# Patient Record
Sex: MALE | Race: WHITE | Employment: OTHER | URBAN - METROPOLITAN AREA
[De-identification: names, ages, dates, MRNs, and addresses within clinical notes are randomized per-mention and may not be internally consistent; named-entity substitution may affect disease eponyms.]

---

## 2020-01-02 ENCOUNTER — MEDICAL CORRESPONDENCE (OUTPATIENT)
Dept: HEALTH INFORMATION MANAGEMENT | Facility: CLINIC | Age: 60
End: 2020-01-02

## 2020-01-20 ENCOUNTER — TRANSFERRED RECORDS (OUTPATIENT)
Dept: HEALTH INFORMATION MANAGEMENT | Facility: CLINIC | Age: 60
End: 2020-01-20

## 2020-03-18 ENCOUNTER — TRANSFERRED RECORDS (OUTPATIENT)
Dept: HEALTH INFORMATION MANAGEMENT | Facility: CLINIC | Age: 60
End: 2020-03-18

## 2020-03-25 ENCOUNTER — MEDICAL CORRESPONDENCE (OUTPATIENT)
Dept: HEALTH INFORMATION MANAGEMENT | Facility: CLINIC | Age: 60
End: 2020-03-25

## 2020-04-02 ENCOUNTER — TRANSFERRED RECORDS (OUTPATIENT)
Dept: HEALTH INFORMATION MANAGEMENT | Facility: CLINIC | Age: 60
End: 2020-04-02

## 2020-04-03 ENCOUNTER — TRANSCRIBE ORDERS (OUTPATIENT)
Dept: OTHER | Age: 60
End: 2020-04-03

## 2020-04-03 ENCOUNTER — TELEPHONE (OUTPATIENT)
Dept: OTOLARYNGOLOGY | Facility: CLINIC | Age: 60
End: 2020-04-03

## 2020-04-03 DIAGNOSIS — R22.1 NECK MASS: Primary | ICD-10-CM

## 2020-04-03 DIAGNOSIS — C06.0 SQUAMOUS CELL CANCER OF BUCCAL MUCOSA (H): Primary | ICD-10-CM

## 2020-04-03 NOTE — TELEPHONE ENCOUNTER
Oncology/Surgical Oncology Referral Request:     Specialty Requested: Medical Oncology - ENT    Referring Provider: Dr Thierry Bass MD    Referring Clinic/Organization: Other - Dr Steven Oseguera office    Records location: Faxed     Requested Provider (if specified): Not Specified        Please review ENT Neck Mass and schedule appropriate provider. Faxed records to [phone]

## 2020-04-06 NOTE — TELEPHONE ENCOUNTER
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 Offered Wednesday and Friday appointments. Patient could not schedule on Friday.     We will arrange consult with Dr. Dior on Wednesday 4/8 with a PET scan prior. Patient's itinerary was emailed to him for review. Patient verbalized understanding of appointment information as well as PET scan instructions.     Patient was encouraged to call with further questions or concerns.       Chani Elise, RN, BSN

## 2020-04-08 ENCOUNTER — HOSPITAL ENCOUNTER (OUTPATIENT)
Dept: PET IMAGING | Facility: CLINIC | Age: 60
End: 2020-04-08
Attending: OTOLARYNGOLOGY
Payer: COMMERCIAL

## 2020-04-08 ENCOUNTER — PRE VISIT (OUTPATIENT)
Dept: OTOLARYNGOLOGY | Facility: CLINIC | Age: 60
End: 2020-04-08

## 2020-04-08 ENCOUNTER — OFFICE VISIT (OUTPATIENT)
Dept: OTOLARYNGOLOGY | Facility: CLINIC | Age: 60
End: 2020-04-08
Payer: COMMERCIAL

## 2020-04-08 ENCOUNTER — PREP FOR PROCEDURE (OUTPATIENT)
Dept: OTOLARYNGOLOGY | Facility: CLINIC | Age: 60
End: 2020-04-08

## 2020-04-08 VITALS
SYSTOLIC BLOOD PRESSURE: 171 MMHG | TEMPERATURE: 98.2 F | DIASTOLIC BLOOD PRESSURE: 88 MMHG | HEIGHT: 74 IN | RESPIRATION RATE: 18 BRPM | BODY MASS INDEX: 25.8 KG/M2 | WEIGHT: 201 LBS | HEART RATE: 82 BPM

## 2020-04-08 DIAGNOSIS — C06.0 SQUAMOUS CELL CANCER OF BUCCAL MUCOSA (H): Primary | ICD-10-CM

## 2020-04-08 DIAGNOSIS — C06.0 SQUAMOUS CELL CANCER OF BUCCAL MUCOSA (H): ICD-10-CM

## 2020-04-08 PROCEDURE — 70491 CT SOFT TISSUE NECK W/DYE: CPT

## 2020-04-08 PROCEDURE — 78816 PET IMAGE W/CT FULL BODY: CPT | Mod: PI

## 2020-04-08 PROCEDURE — 34300033 ZZH RX 343: Performed by: OTOLARYNGOLOGY

## 2020-04-08 PROCEDURE — 25000128 H RX IP 250 OP 636: Performed by: OTOLARYNGOLOGY

## 2020-04-08 PROCEDURE — A9552 F18 FDG: HCPCS | Performed by: OTOLARYNGOLOGY

## 2020-04-08 RX ORDER — FENOFIBRATE 150 MG/1
CAPSULE ORAL
COMMUNITY
Start: 2020-02-11 | End: 2020-04-14

## 2020-04-08 RX ORDER — IOPAMIDOL 755 MG/ML
10-140 INJECTION, SOLUTION INTRAVASCULAR ONCE
Status: COMPLETED | OUTPATIENT
Start: 2020-04-08 | End: 2020-04-08

## 2020-04-08 RX ADMIN — FLUDEOXYGLUCOSE F-18 14.5 MCI.: 500 INJECTION, SOLUTION INTRAVENOUS at 14:10

## 2020-04-08 RX ADMIN — IOPAMIDOL 132 ML: 755 INJECTION, SOLUTION INTRAVENOUS at 15:33

## 2020-04-08 ASSESSMENT — MIFFLIN-ST. JEOR: SCORE: 1801.73

## 2020-04-08 ASSESSMENT — PAIN SCALES - GENERAL: PAINLEVEL: NO PAIN (0)

## 2020-04-08 NOTE — NURSING NOTE
"Chief Complaint   Patient presents with     Consult     squamous cell carcinoma of oral cavity    Blood pressure (!) 171/88, pulse 82, temperature 98.2  F (36.8  C), resp. rate 18, height 1.88 m (6' 2.02\"), weight 91.2 kg (201 lb).      Travis Kaur LPN    "

## 2020-04-08 NOTE — Clinical Note
4/8/2020       RE: Norris Reyes  [address]  [place] [address]     Dear Colleague,    Thank you for referring your patient, Norris Reyes, to the [facility] EAR [facility]ROAT at [facility]. Please see a copy of my visit note below.     Dictation on: 04/08/2020  5:19 PM by: KAMERON CAAL [[id number]]         Past Medical History:   Diagnosis Date     Autoimmune disease (H) 2/11/2020     Hypertension 2/11/2020    Losartan     No past surgical history on file.    Current Outpatient Medications:      Fenofibrate 150 MG CAPS, , Disp: , Rfl:      LOSARTAN POTASSIUM PO, , Disp: , Rfl:   No current facility-administered medications for this visit.   No Known Allergies  Social History     Tobacco Use     Smoking status: Never Smoker     Smokeless tobacco: Never Used   Substance Use Topics     Alcohol use: Yes     Comment: 1 drink a day but not currently     Review Of Systems  Skin: negative  Eyes: negative  Ears/Nose/Throat: negative  Respiratory: No shortness of breath, dyspnea on exertion, cough, or hemoptysis  Cardiovascular: negative  Gastrointestinal: negative  Genitourinary: negative  Musculoskeletal: negative  Neurologic: negative  Psychiatric: negative  Hematologic/Lymphatic/Immunologic: negative  Endocrine: negative     Dictation on: 04/08/2020  5:21 PM by: KAMERON CAAL [[id number]]           Again, thank you for allowing me to participate in the care of your patient.      Sincerely,    Kameron Caal MD

## 2020-04-08 NOTE — PATIENT INSTRUCTIONS
1. Surgery planned for Thursday 4/23.   2. Please schedule pre-op physical with PCP and contact Chani if you have issues scheduling this.   3. Please call the ENT clinic with any questions,concerns, new or worsening symptoms.    -Clinic number is [phone]   - Chani's direct line (Dr. Dior's nurse) [phone]    4. Chani will be in contact with you on Friday after tumor board.

## 2020-04-08 NOTE — LETTER
4/8/2020      RE: Norris Reyes  [address]  [place] [address]       April 8, 2020         Steven Grider MD   [address]   [place] [address]         Dear Dr. Grider,      Thank you for requesting consultation on Norris Reyes.  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  He has noticed a lump under his left jaw bone.  He has not had much pain from this.  He has not noticed any parotid swelling.  He has had no numbness of his tongue or cheek.  He has no otalgia or odynophagia.         Past Medical History:   Diagnosis Date     Autoimmune disease (H) 2/11/2020     Hypertension 2/11/2020    Losartan     No past surgical history on file.    Current Outpatient Medications:      Fenofibrate 150 MG CAPS, , Disp: , Rfl:      LOSARTAN POTASSIUM PO, , Disp: , Rfl:   No current facility-administered medications for this visit.   No Known Allergies  Social History     Tobacco Use     Smoking status: Never Smoker     Smokeless tobacco: Never Used   Substance Use Topics     Alcohol use: Yes     Comment: 1 drink a day but not currently     Review Of Systems  Skin: negative  Eyes: negative  Ears/Nose/Throat: negative  Respiratory: No shortness of breath, dyspnea on exertion, cough, or hemoptysis  Cardiovascular: negative  Gastrointestinal: negative  Genitourinary: negative  Musculoskeletal: negative  Neurologic: negative  Psychiatric: negative  Hematologic/Lymphatic/Immunologic: negative  Endocrine: negative    PHYSICAL EXAMINATION:  He is well appearing, in no distress.  Examination of the oral cavity reveals he has an obvious approximately 3.0 x 2.5 cm lesion in the buccal mucosa that is somewhat  posterior and not involving the oral commissure.  It dips in the gingival buccal sulcus posteriorly and is a little bit erosive inferiorly.  It does get very close to and slightly onto the alveolar surface where his wisdom teeth would have been on the left side.  I do not see any evidence for any bone invasion or erosion.  The retromolar trigone itself does not appear to be involved.  It does go up towards the upper gingival buccal sulcus, but does not involve the maxillary tuberosity or the maxillary dentition.  Examining him externally, I do not feel or sense any involvement in the subcutaneous tissues of the cheek.  The remainder of the oral cavity examination is unremarkable.  He cannot tolerate mirror exam.  Examination of the neck reveals an obvious mass or conglomerate of nodes in the left level IB.  He has no lymphadenopathy.      IMAGING:  The patient had a CT scan and PET that shows the primary lesion as well as disease in level IB but no other disease is evident.  They have not been officially read.      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      PLAN:   1.  The patient was counseled that this requires surgery via wide local excision of the buccal mucosa and left neck dissection, levels I through IV.  He will also need a feeding tube and possible trach as well as a forearm flap.  I did do an Tiburcio's test on the right arm as he is left handed.  He has a right arm suitable for transfer.   2.  I discussed the risks of surgery, which include but are not limited to bleeding, infection, return trips to the operating room and possible flap loss.  He understands and wishes to proceed.   3.  We will discuss his case at our Tumor Conference this week.   4.  He did meet with our speech pathologist.  All of his questions were answered.      Sincerely,         Kameron Dior M.D.        Otolaryngology-Head & Neck Surgery    [phone]

## 2020-04-08 NOTE — NURSING NOTE
Teaching Flowsheet - ENT   Relevant Diagnosis: scc left buccal mucosa  Teaching Topic: wide local excision of left buccal mucosa, left neck dissection, possible trach placement, NG tube placement, right forearm free flap recon, right thighSTSG  Person(s) involved in teaching:  Patient     Motivation Level:  Asks Questions:   Yes  Eager to Learn:   Yes  Cooperative:   Yes  Receptive (willing/able to accept information):   Yes  Comments: Reviewed pre-op H and P,  NPO prior to  surgery,  pre-op scrub (given Hibiclens)  Reviewed post-op  cares , activity and pain.     Patient demonstrates understanding of the following:  Reason for the appointment, diagnosis and treatment plan:   Yes  Knowledge of proper use of medications and conditions for which they are ordered (with special attention to potential side effects or drug interactions):  stop aspirin products 1 week before surgery Yes  Which situations necessitate calling provider and whom to contact:   Yes  Nutritional needs and diet plan:   Yes  Pain management techniques:   Yes  Patient instructed on hand hygiene:  Yes  How and/when to access community resources:   Yes     Infection Prevention:  Patient   demonstrates understanding of the following:  Surgical procedure site care taught Yes  Signs and symptoms of infection taught Yes  Wound care taught Yes  Instructional Materials Used/Given: pre- op booklet,verbal  Instruction.    Chani Elise, RN, BSN

## 2020-04-08 NOTE — PROGRESS NOTES
April 8, 2020         Steven Grider MD   [facility]   [address]   [place] [id number]         Dear Dr. Grider,      Thank you for requesting consultation on Norris Reyes.  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  He has noticed a lump under his left jaw bone.  He has not had much pain from this.  He has not noticed any parotid swelling.  He has had no numbness of his tongue or cheek.  He has no otalgia or odynophagia.         Past Medical History:   Diagnosis Date     Autoimmune disease (H) 2/11/2020     Hypertension 2/11/2020    Losartan     No past surgical history on file.    Current Outpatient Medications:      Fenofibrate 150 MG CAPS, , Disp: , Rfl:      LOSARTAN POTASSIUM PO, , Disp: , Rfl:   No current facility-administered medications for this visit.   No Known Allergies  Social History     Tobacco Use     Smoking status: Never Smoker     Smokeless tobacco: Never Used   Substance Use Topics     Alcohol use: Yes     Comment: 1 drink a day but not currently     Review Of Systems  Skin: negative  Eyes: negative  Ears/Nose/Throat: negative  Respiratory: No shortness of breath, dyspnea on exertion, cough, or hemoptysis  Cardiovascular: negative  Gastrointestinal: negative  Genitourinary: negative  Musculoskeletal: negative  Neurologic: negative  Psychiatric: negative  Hematologic/Lymphatic/Immunologic: negative  Endocrine: negative    PHYSICAL EXAMINATION:  He is well appearing, in no distress.  Examination of the oral cavity reveals he has an obvious approximately 3.0 x 2.5 cm lesion in the buccal mucosa that is somewhat posterior and not involving the oral commissure.  It dips in the gingival buccal sulcus  posteriorly and is a little bit erosive inferiorly.  It does get very close to and slightly onto the alveolar surface where his wisdom teeth would have been on the left side.  I do not see any evidence for any bone invasion or erosion.  The retromolar trigone itself does not appear to be involved.  It does go up towards the upper gingival buccal sulcus, but does not involve the maxillary tuberosity or the maxillary dentition.  Examining him externally, I do not feel or sense any involvement in the subcutaneous tissues of the cheek.  The remainder of the oral cavity examination is unremarkable.  He cannot tolerate mirror exam.  Examination of the neck reveals an obvious mass or conglomerate of nodes in the left level IB.  He has no lymphadenopathy.      IMAGING:  The patient had a CT scan and PET that shows the primary lesion as well as disease in level IB but no other disease is evident.  They have not been officially read.      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      PLAN:   1.  The patient was counseled that this requires surgery via wide local excision of the buccal mucosa and left neck dissection, levels I through IV.  He will also need a feeding tube and possible trach as well as a forearm flap.  I did do an Tiburcio's test on the right arm as he is left handed.  He has a right arm suitable for transfer.   2.  I discussed the risks of surgery, which include but are not limited to bleeding, infection, return trips to the operating room and possible flap loss.  He understands and wishes to proceed.   3.  We will discuss his case at our Tumor Conference this week.   4.  He did meet with our speech pathologist.  All of his questions were answered.      Sincerely,         Kameron Dior M.D.        Otolaryngology-Head & Neck Surgery    [phone]

## 2020-04-09 ENCOUNTER — ALLIED HEALTH/NURSE VISIT (OUTPATIENT)
Dept: SPEECH THERAPY | Facility: CLINIC | Age: 60
End: 2020-04-09

## 2020-04-09 PROBLEM — C06.0 SQUAMOUS CELL CANCER OF BUCCAL MUCOSA (H): Status: ACTIVE | Noted: 2020-04-09

## 2020-04-09 LAB — COPATH REPORT: NORMAL

## 2020-04-09 PROCEDURE — 00000346 ZZHCL STATISTIC REVIEW OUTSIDE SLIDES TC 88321: Performed by: OTOLARYNGOLOGY

## 2020-04-09 NOTE — PROGRESS NOTES
Pt seen for brief allied health care provider visit. Introduced self and SLP services. Pt will likely undergo WLC to remove left buccal mass, neck dissection and reconstruction. Provided brief education on potential changes in speech, swallowing, and jaw ROM. Pt will likely require formal swallowing evaluation after surgery. Pt agreeable to participate in speech pathology services as appropriate following surgery. He will also potentially benefit from virtual follow up once he returns home.

## 2020-04-09 NOTE — NURSING NOTE
Patient was given green forearm bracelet and advised to wear on his RIGHT arm the morning of surgery. Instructed patient that this will ensure that there are no IV's placed or labs drawn from that arm on the morning of surgery. Patient verbalized understanding of this and was encouraged to call with further questions or concerns.     Chani Elise RN, BSN

## 2020-04-09 NOTE — PROGRESS NOTES
"Head & Neck Tumor Conference Note        Status: New  Staff: Dr. Dior    Tumor Site: Left buccal mucosa  Tumor Pathology: SCC  Tumor Stage: [id number]  Tumor Treatment: None    Reason for Review: Review imaging, path, and POC    Brief History: This is a 59 year old male with a history of known white patches and perhaps some erythroplakia in his oral cavity.  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  He has noticed a lump under his left jaw bone.  He has not had much pain from this.  He has not noticed any parotid swelling.  He has had no numbness of his tongue or cheek.  He has no otalgia or odynophagia. Examination in the office demonstrated a 3x2.5cm lesion on the left buccal mucosa that spared the oral commissure but does involve the gingivobuccal sulcus posteriorly with some involvement of the posterior mandibular alveolus without evidence of caitlin invasion. It did not seem to involve the subcutaneous tissues of the cheek. There was an obvious mass in left level Ib.    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 Fairly sizable hypermetabolic lesion along the left buccal mucosa consistent with patient's biopsy-proven primary disease. It spans along the inner surface of the maxilla and mandible.   1b. Left level 1B metastatic lymphadenopathy, the largest measuring 2.7 cm. Additional indeterminate " "left level 2 and left level 3 nodes, not enlarged but mild-moderately FDG avid.  2. 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 Scattered tiny solid pulmonary nodules.  2. Otherwise, no evidence for metastatic disease in the chest, abdomen, or pelvis.  3. See dedicated neuroradiology report for the results of the high resolution PET CT of the neck.   4. Indeterminate left renal cystic lesion. This is likely a hemorrhagic or proteinaceous cyst, however ultrasound could be used to determine if this lesion is truly cystic or solid.  5. Chronic appearing mild compression deformity of L1 vertebral body.\"    Pathology:   FINAL DIAGNOSIS:   CASE FROM "[employer] [place], [place] ([id number], OBTAINED 03/18/2020):   ORAL MUCOSA, LEFT VOCAL, BIOPSY:   - INVASIVE KERATINIZING SQUAMOUS CELL CARCINOMA, moderately differentiated, extending to biopsy margins.   - Invasive carcinoma involves glossal muscle, with a minimum depth of invasion of 4 mm.   - P16 immunostain is negative.     Tumor Board Recommendation:   Discussion: 58yo male with [id number] SCC of the left buccal mucosa with left level Ib lymphadenopathy.    Review of imaging shows the FDG-avid left buccal mucosa tumor. The inferior aspect of the tumor is bulbous which suggests that it may be larger and deeper than it appeared on exam. There are several enlarged and hypermetabolic left level IB nodes with no sign of invasion of the submandibular gland. There are several other indeterminate left level II nodes. There is no evidence of cortical erosion of the mandible or maxilla, which agrees with physical exam.    Treatment recommendation is WLE with likely flap repair, left neck dissection, adjuvant radiation and possible chemo pending final pathology. "     Plan:   -Pre-op dental consult (will be more difficult evaluation with flap in place)  -WLE with likely flap repair, left neck dissection, adjuvant radiation and possible chemo pending final pathology.     Juan Pablo Pang MD PGY-3  Otolaryngology- Head and Neck Surgery    Documentation / Disclaimer Cancer Tumor Board Note  Cancer tumor board recommendations do not override what is determined to be reasonable care and treatment, which is dependent on the circumstances of a patient's case; the patient's medical, social, and personal concerns; and the clinical judgment of the oncologist [physician].

## 2020-04-10 ENCOUNTER — TUMOR CONFERENCE (OUTPATIENT)
Dept: ONCOLOGY | Facility: CLINIC | Age: 60
End: 2020-04-10
Payer: COMMERCIAL

## 2020-04-10 NOTE — PROGRESS NOTES
Called patient to discuss the following PET scan results:    IMPRESSION: In this patient with recently diagnosed squamous cell  carcinoma of the left buccal mucosal:  1. Scattered tiny solid pulmonary nodules.  2. Otherwise, no evidence for metastatic disease in the chest,  abdomen, or pelvis.  3. See dedicated neuroradiology report for the results of the high  resolution PET CT of the neck.   4. Indeterminate left renal cystic lesion. This is likely a  hemorrhagic or proteinaceous cyst, however ultrasound could be used to  determine if this lesion is truly cystic or solid.  5. Chronic appearing mild compression deformity of L1 vertebral body.    Impression: In this patient with recently diagnosed squamous cell  carcinoma of the left buccal mucosal:  1a. Fairly sizable hypermetabolic lesion along the left buccal mucosa  consistent with patient's biopsy-proven primary disease. It spans  along the inner surface of the maxilla and mandible.  1b. Left level 1B metastatic lymphadenopathy, the largest measuring  2.7 cm. Additional indeterminate left level 2 and left level 3 nodes,   not enlarged but mild-moderately FDG avid.     2. Please refer to the whole body PET CT performed as a separate  report, for the findings of the remainder of the body.       Reviewed plan for wide local excision, left neck dissection and forearm free flap with post op radiation. Reviewed radiation schedule with patient and advised that we would recommend this be done here if at all possible. Patient is in agreement to come here for treatment. We will have him meet with radiation oncology pre-op as well as dental evaluation pre-op. Patient agreeable to this plan.     He is now scheduled for the followin: PAC consult Wednesday 4/15 at 11:00am.   2. Radiation consult with Dr. Kurtz on 4/15 at 1:00pm.   3. Awaiting plan from dental resident on time and location of dental eval on 4/15.   4. Surgery is scheduled for .     All  appointment information was sent to patient via e-mail as well. He and his wife verbalized understanding and was encouraged to call with further questions or concerns.     Chani Elise RN, BSN

## 2020-04-13 ENCOUNTER — PATIENT OUTREACH (OUTPATIENT)
Dept: OTOLARYNGOLOGY | Facility: CLINIC | Age: 60
End: 2020-04-13

## 2020-04-13 NOTE — TELEPHONE ENCOUNTER
FUTURE VISIT INFORMATION      SURGERY INFORMATION:    Date: 4/23/20    Location: [facility] OR    Surgeon:  Kameron Dior MD     Anesthesia Type:  General    Procedure: wide local excision of left buccal mucosa squamous cell carcinoma left modified radical neck dissection possible Tracheostomy placement, nasogastric tube placement    Consult: Tumor Conference 4/10    RECORDS REQUESTED FROM:       Primary Care Provider: [facility]

## 2020-04-13 NOTE — PROGRESS NOTES
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 Dior    HISTORY OF PRESENT ILLNESS: ***    Mr. 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  He presented to an outside ENT surgeon on 3/18/2020 with the complaint of a lesion of the left buccal mucosa which had been present and getting worse over the past 1 month.  Exam at that time was notable for an ulcerated lesion involving the left buccal mucosa and left lymphadenopathy involving levels I-III.  A biopsy was performed in clinic and pathology which has been reviewed at our institution ([id number]) showed moderately differentiated invasive keratinizing squamous cell carcinoma, p16-, with a minimum depth of invasion of 4 mm.  He was subsequently referred to the [facility] for further management.    He underwent staging PET/CT on 2020 which showed an area of FDG avid nodular thickening along the left buccal mucosa measuring approximately 1.7 x 1.1 x 3.2 cm with SUV max 20.5.  The lesion spanned the inner surface of the maxilla and mandible without any obvious invasion into the overlying subcutaneous tissues of the left cheek or subjacent bone.  In addition, there was an enlarged and FDG avid left level 1B lymph node measuring 2.7 cm with SUV max 13.4.  Other mildly FDG avid lymph nodes included two left level 1B lymph nodes measuring 1.1 cm and 0.8 cm  "respectively as well as a 0.8 cm left level 2A lymph node and a 0.8 cm left level 3 lymph node.  There was no evidence of hypermetabolic disease on the right.  Systemic imaging revealed multiple tiny solid pulmonary nodules measuring up to 3 mm but otherwise no findings suspicious for metastatic disease.    The patient also met Dr. Dior of ENT surgery on 4/8/2020. At that time exam was as follows: \"oral cavity reveals he has an obvious approximately 3.0 x 2.5 cm lesion in the buccal mucosa that is somewhat posterior and not involving the oral commissure.  It dips in the gingival buccal sulcus posteriorly and is a little bit erosive inferiorly.  It does get very close to and slightly onto the alveolar surface where his wisdom teeth would have been on the left side.  I do not see any evidence for any bone invasion or erosion.  The retromolar trigone itself does not appear to be involved.  It does go up towards the upper gingival buccal sulcus, but does not involve the maxillary tuberosity or the maxillary dentition.  Examining him externally, I do not feel or sense any involvement in the subcutaneous tissues of the cheek.  The remainder of the oral cavity examination is unremarkable.  He cannot tolerate mirror exam.  Examination of the neck reveals an obvious mass or conglomerate of nodes in the left level IB.\" Surgery via wide local excision of the buccal mucosa and left neck dissection (levels I through IV) was recommended. He is referred to our department to further discuss the role of radiotherapy in the overall management of his recently diagnosed oral cavity cancer.       PAST MEDICAL HISTORY: ***   Past Medical History:   Diagnosis Date     Autoimmune disease (H) 2/11/2020     Hypertension 2/11/2020    Losartan     No past surgical history on file.      CHEMOTHERAPY HISTORY: ***     PAST RADIATION THERAPY HISTORY: ***     IMPLANTED DEVICES: ***    AUTOIMMUNE/CONNECTIVE TISSUE DISORDERS: ***    MEDICATIONS: "   Current Outpatient Medications   Medication Sig Dispense Refill     Fenofibrate 150 MG CAPS        LOSARTAN POTASSIUM PO          ALLERGIES:  No Known Allergies.    SOCIAL HISTORY: ***. Lives in [place]. Never smoker. Occ EtOH, none currently.     FAMILY HISTORY: ***  family history includes Heart Disease in his brother; Thyroid Disease in his sister.    REVIEW OF SYMPTOMS:  A full 14-point review of systems was performed. ***    PHYSICAL EXAMINATION:    There were no vitals taken for this visit.  ***    ECOG PS: ***    ASSESSMENT: ***    RECOMMENDATIONS: ***    Thank you for allowing us to participate in this patient's care.  Please feel free to call with any questions or concerns.    The patient was seen and discussed with staff, Dr. Kurtz.     ***

## 2020-04-15 ENCOUNTER — PRE VISIT (OUTPATIENT)
Dept: SURGERY | Facility: CLINIC | Age: 60
End: 2020-04-15

## 2020-04-15 ENCOUNTER — OFFICE VISIT (OUTPATIENT)
Dept: RADIATION ONCOLOGY | Facility: CLINIC | Age: 60
End: 2020-04-15
Attending: OTOLARYNGOLOGY
Payer: COMMERCIAL

## 2020-04-15 ENCOUNTER — ANESTHESIA EVENT (OUTPATIENT)
Dept: SURGERY | Facility: CLINIC | Age: 60
End: 2020-04-15

## 2020-04-15 ENCOUNTER — OFFICE VISIT (OUTPATIENT)
Dept: SURGERY | Facility: CLINIC | Age: 60
End: 2020-04-15
Payer: COMMERCIAL

## 2020-04-15 VITALS
SYSTOLIC BLOOD PRESSURE: 150 MMHG | HEIGHT: 72 IN | WEIGHT: 200 LBS | BODY MASS INDEX: 27.09 KG/M2 | TEMPERATURE: 97.9 F | DIASTOLIC BLOOD PRESSURE: 87 MMHG | HEART RATE: 89 BPM | RESPIRATION RATE: 19 BRPM | OXYGEN SATURATION: 98 %

## 2020-04-15 DIAGNOSIS — Z01.818 PREOP EXAMINATION: ICD-10-CM

## 2020-04-15 DIAGNOSIS — C06.0 SQUAMOUS CELL CANCER OF BUCCAL MUCOSA (H): Primary | ICD-10-CM

## 2020-04-15 DIAGNOSIS — Z01.818 PREOP EXAMINATION: Primary | ICD-10-CM

## 2020-04-15 LAB
ANION GAP SERPL CALCULATED.3IONS-SCNC: 4 MMOL/L (ref 3–14)
BUN SERPL-MCNC: 21 MG/DL (ref 7–30)
CALCIUM SERPL-MCNC: 9.4 MG/DL (ref 8.5–10.1)
CHLORIDE SERPL-SCNC: 105 MMOL/L (ref 94–109)
CO2 SERPL-SCNC: 29 MMOL/L (ref 20–32)
CREAT SERPL-MCNC: 1.22 MG/DL (ref 0.66–1.25)
ERYTHROCYTE [DISTWIDTH] IN BLOOD BY AUTOMATED COUNT: 11.7 % (ref 10–15)
GFR SERPL CREATININE-BSD FRML MDRD: 65 ML/MIN/{1.73_M2}
GLUCOSE SERPL-MCNC: 131 MG/DL (ref 70–99)
HCT VFR BLD AUTO: 48.2 % (ref 40–53)
HGB BLD-MCNC: 15.8 G/DL (ref 13.3–17.7)
MCH RBC QN AUTO: 28.9 PG (ref 26.5–33)
MCHC RBC AUTO-ENTMCNC: 32.8 G/DL (ref 31.5–36.5)
MCV RBC AUTO: 88 FL (ref 78–100)
PLATELET # BLD AUTO: 159 10E9/L (ref 150–450)
POTASSIUM SERPL-SCNC: 4 MMOL/L (ref 3.4–5.3)
RBC # BLD AUTO: 5.47 10E12/L (ref 4.4–5.9)
SODIUM SERPL-SCNC: 137 MMOL/L (ref 133–144)
WBC # BLD AUTO: 5.5 10E9/L (ref 4–11)

## 2020-04-15 PROCEDURE — G0463 HOSPITAL OUTPT CLINIC VISIT: HCPCS | Performed by: RADIOLOGY

## 2020-04-15 ASSESSMENT — ENCOUNTER SYMPTOMS
SPEECH CHANGE: 0
SHORTNESS OF BREATH: 0
EYE DISCHARGE: 0
BLOOD IN STOOL: 0
FALLS: 0
FEVER: 0
BRUISES/BLEEDS EASILY: 0
BACK PAIN: 0
MYALGIAS: 0
DOUBLE VISION: 0
BLURRED VISION: 0
CLAUDICATION: 0
POLYDIPSIA: 0
CONSTIPATION: 0
ORTHOPNEA: 0
SENSORY CHANGE: 0
NAUSEA: 0
COUGH: 0
SORE THROAT: 0
VOMITING: 0
HEMOPTYSIS: 0
NERVOUS/ANXIOUS: 1
SEIZURES: 0
MEMORY LOSS: 0
TINGLING: 0
ABDOMINAL PAIN: 0
PALPITATIONS: 0
PHOTOPHOBIA: 0
TREMORS: 0
EYE PAIN: 0
HEMATURIA: 0
FLANK PAIN: 0
DIZZINESS: 0
LOSS OF CONSCIOUSNESS: 0
HALLUCINATIONS: 0
FOCAL WEAKNESS: 0
WHEEZING: 0
DEPRESSION: 0
PND: 0
WEIGHT LOSS: 0
DIARRHEA: 0
STRIDOR: 0
EYE REDNESS: 0
SPUTUM PRODUCTION: 0
INSOMNIA: 0
NECK PAIN: 0
DIAPHORESIS: 0
WEAKNESS: 0
FREQUENCY: 0
HEADACHES: 0
DYSURIA: 0
CHILLS: 0
HEARTBURN: 0
SINUS PAIN: 0

## 2020-04-15 ASSESSMENT — LIFESTYLE VARIABLES: SUBSTANCE_ABUSE: 0

## 2020-04-15 ASSESSMENT — PAIN SCALES - GENERAL: PAINLEVEL: NO PAIN (0)

## 2020-04-15 ASSESSMENT — MIFFLIN-ST. JEOR: SCORE: 1765.19

## 2020-04-15 NOTE — H&P
Pre-Operative H & P     CC:  Preoperative exam to assess for increased cardiopulmonary risk while undergoing surgery and anesthesia.    Date of Encounter: 4/15/2020  Primary Care Physician:  No primary care provider on file.  Associated Diagnosis: SCC of left buccal  mucosa    STANLEY Reyes is a 59 year old male who presents for pre-operative H & P in preparation for wide local excision of left buccal mucosa squamous cell carcinoma, left modified radical neck dissection, possible Tracheostomy placement, nasogastric tube placement, right forearm free flap, split thickness skin graft from right thigh with Dr. Dior and Dr. Castañeda on 4/23/2020 at [facility]. General Anesthesia.    The patient is a 59-year-old male with a past medical history significant for hypertension.  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  He recently had a visit with the dentist who noticed an area of erythema on his left cheek.  He was recommended to have this biopsied.  Subsequent biopsy revealed squamous cell carcinoma of the left buccal mucosa.  He also has a lump under his left jawbone that he describes as nontender.  He was referred to the [facility] otolaryngology and ENT for further evaluation and treatment.  He reports today that he had a dental visit this morning and he is to have extraction of 3 teeth prior to surgery.  He also is waiting to hear from ENT nurse coordinator about a presurgical COVID-19 test.  He has been counseled on the above procedure and wishes to proceed.    History is obtained from the patient.     Past Medical History  Past Medical History:   Diagnosis Date     Autoimmune disease (H) 2/11/2020     Hypertension 2/11/2020    Losartan     Nephrolithiasis      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)        Past Surgical History  History reviewed. No pertinent surgical history.    Hx of Blood  transfusions/reactions: denies     Hx of abnormal bleeding or anti-platelet use: denies    Menstrual history: No LMP for male patient.:     Steroid use in the last year: denies    Personal or FH with difficulty with Anesthesia:  denies    Prior to Admission Medications  Current Outpatient Medications   Medication Sig Dispense Refill     diphenhydrAMINE (BENADRYL) 25 MG tablet Take 25 mg by mouth nightly as needed for sleep       fenofibrate (TRIGLIDE/LOFIBRA) 160 MG tablet Take 160 mg by mouth daily       Homeopathic Products (FRANKINCENSE UPLIFTING) OIL Apply topically 2 times daily To outside of cheek       LOSARTAN POTASSIUM PO Take 50 mg by mouth every morning          Allergies  No Known Allergies    Social History  Social History     Socioeconomic History     Marital status:      Spouse name: Not on file     Number of children: Not on file     Years of education: Not on file     Highest education level: Not on file   Occupational History     Not on file   Social Needs     Financial resource strain: Not on file     Food insecurity     Worry: Not on file     Inability: Not on file     Transportation needs     Medical: Not on file     Non-medical: Not on file   Tobacco Use     Smoking status: Never Smoker     Smokeless tobacco: Never Used   Substance and Sexual Activity     Alcohol use: Yes     Comment: 1 drink a day but not currently     Drug use: Never     Sexual activity: Not Currently     Partners: Female     Birth control/protection: Male Surgical   Lifestyle     Physical activity     Days per week: Not on file     Minutes per session: Not on file     Stress: Not on file   Relationships     Social connections     Talks on phone: Not on file     Gets together: Not on file     Attends [employer] service: Not on file     Active member of club or organization: Not on file     Attends meetings of clubs or organizations: Not on file     Relationship status: Not on file     Intimate partner violence     Fear  "of current or ex partner: Not on file     Emotionally abused: Not on file     Physically abused: Not on file     Forced sexual activity: Not on file   Other Topics Concern     Not on file   Social History Narrative     Not on file       Family History  Family History   Problem Relation Age of Onset     Heart Disease Brother              Thyroid Disease Sister      Hypertension Mother      No Known Problems Father            Anesthesia Evaluation     . Pt has not had prior anesthetic            ROS/MED HX  The complete review of systems is negative other than noted in the HPI or here.  ENT/Pulmonary:     (+)CATHI risk factors hypertension, , . .    Neurologic:  - neg neurologic ROS     Cardiovascular:     (+) hypertension----. : . . . :. . Previous cardiac testing date:results:date: results:ECG reviewed date: results: date: results:          METS/Exercise Tolerance:  >4 METS   Hematologic:  - neg hematologic  ROS       Musculoskeletal:  - neg musculoskeletal ROS       GI/Hepatic:  - neg GI/hepatic ROS       Renal/Genitourinary:     (+) Nephrolithiasis ,       Endo:  - neg endo ROS       Psychiatric:  - neg psychiatric ROS       Infectious Disease:  - neg infectious disease ROS       Malignancy:   (+) Malignancy History of Other  Other CA Squamous cell cancer of left buccal mucosa Active status post         Other:    - neg other ROS         PHYSICAL EXAM:   Mental Status/Neuro: A/A/O; Age Appropriate   Airway: Facies: Feasible  Mallampati: II  Mouth/Opening: Full  TM distance: > 6 cm  Neck ROM: Full   Respiratory: Auscultation: CTAB     Resp. Rate: Normal     Resp. Effort: Normal      CV: Rhythm: Regular  Rate: Age appropriate  Heart: Normal Sounds  Edema: None   Comments:      Dental: Normal Dentition               Temp: 97.9  F (36.6  C) Temp src: Oral BP: (!) 150/87 Pulse: 89   Resp: 19 SpO2: 98 %         200 lbs 0 oz  6' 0\"   Body mass index is 27.12 kg/m .       Physical Exam  Constitutional: Awake, alert, " cooperative, no apparent distress, and appears stated age.  Eyes: Pupils equal, round and reactive to light, extra ocular muscles intact, sclera clear, conjunctiva normal.  HENT: Normocephalic, oral pharynx with moist mucus membranes, good dentition. Obvious mass present on left neck, submandibular.   Respiratory: Clear to auscultation bilaterally, no crackles or wheezing.  Cardiovascular: Regular rate and rhythm, normal S1 and S2, and no murmur noted.  Carotids +2, no bruits. No edema. Palpable pulses to radial  DP and PT arteries.   GI: Normal bowel sounds  Lymph/Hematologic: No cervical lymphadenopathy and no supraclavicular lymphadenopathy.  Genitourinary:  deferred  Skin: Warm and dry.  No rashes at anticipated surgical site.   Musculoskeletal: Full ROM of neck. There is no redness, warmth, or swelling of the joints. Gross motor strength is normal.    Neurologic: Awake, alert, oriented to name, place and time. Cranial nerves II-XII are grossly intact. Gait is normal.   Neuropsychiatric: Calm, cooperative. Normal affect.     Labs: (personally reviewed)  Component      Latest Ref Rng & Units 4/15/2020   WBC      4.0 - 11.0 10e9/L 5.5   RBC Count      4.4 - 5.9 10e12/L 5.47   Hemoglobin      13.3 - 17.7 g/dL 15.8   Hematocrit      40.0 - 53.0 % 48.2   MCV      78 - 100 fl 88   MCH      26.5 - 33.0 pg 28.9   MCHC      31.5 - 36.5 g/dL 32.8   RDW      10.0 - 15.0 % 11.7   Platelet Count      150 - 450 10e9/L 159     Component      Latest Ref Rng & Units 4/15/2020   Sodium      133 - 144 mmol/L 137   Potassium      3.4 - 5.3 mmol/L 4.0   Chloride      94 - 109 mmol/L 105   Carbon Dioxide      20 - 32 mmol/L 29   Anion Gap      3 - 14 mmol/L 4   Glucose      70 - 99 mg/dL 131 (H)   Urea Nitrogen      7 - 30 mg/dL 21   Creatinine      0.66 - 1.25 mg/dL 1.22   GFR Estimate      >60 mL/min/1.73:m2 65   GFR Estimate If Black      >60 mL/min/1.73:m2 75   Calcium      8.5 - 10.1 mg/dL 9.4           PET CT fusion examination  4/8/2020 3:53 PM  1. Neck CT with contrast  2. PET study of the neck  3. PET CT fusion study of the neck     History:  Recent biopsy of left buccal mucosal lesion the patient has  had for 10 years. More recently had pain in this region. Biopsy  positive for squamous cell carcinoma. Initial staging..     Comparison: None available.     Technique: Please refer to the accompanying whole body PET-CT for  report of the dose and whole body PET-CT findings.  Regarding the neck, axial images were obtained after nonionic  intravenous contrast administration, with sagittal and coronal  reconstructions performed. Neck CT images were reviewed in bone, soft  tissue, and lung windows, with review of the fused PET-CT images as  well in multiple planes.     Findings:     There is an area of FDG avid nodular thickening along the left buccal  mucosa on series 5, image 115 measuring approximately 1.7 x 1.1 x 3.2  cm. This lesion is metabolically active with maximal SUV of 20.5.  There is no obvious invasion into the overlying subcutaneous tissues  of the left cheek or subjacent bone. The most cranial aspect of the  mass is immediately posterior to left Stensen duct drainage site  (series 5, image 101).   There is an enlarged and FDG avid left level 1B lymph node with  central areas of cystic change. This measures 2.7 cm on series 5,  image 128 with maximal SUV of 13.4. This lesion abuts but does not  invade the left submandibular gland. A second left level 1b node  measures 1.1 cm and is FDG avid. 0.8 cm left level 1 b node is mildly  FDG avid.   There is a 0.8 cm mildly FDG avid left level 2a node (max SUV 4.9).   0.8 cm mildly FDG avid left level 3 node.   Remainder of the mucosal surfaces of the nasopharynx, oropharynx,  hypopharynx or the glottis are normal. The tongue base appears normal.  The major salivary glands and thyroid gland appear normal.     Limited evaluation of the cervical vertebral column demonstrates  no  significant spinal canal stenosis. The visualized paranasal sinuses  and mastoid air cells are clear. The major vascular structures in the  neck are patent.     The visualized lung apices appear clear.                                                                      Impression: In this patient with recently diagnosed squamous cell  carcinoma of the left buccal mucosal:  1a. Fairly sizable hypermetabolic lesion along the left buccal mucosa  consistent with patient's biopsy-proven primary disease. It spans  along the inner surface of the maxilla and mandible.  1b. Left level 1B metastatic lymphadenopathy, the largest measuring  2.7 cm. Additional indeterminate left level 2 and left level 3 nodes,   not enlarged but mild-moderately FDG avid.     2. Please refer to the whole body PET CT performed as a separate  report, for the findings of the remainder of the body.    IMPRESSION: In this patient with recently diagnosed squamous cell  carcinoma of the left buccal mucosal:  1. Scattered tiny solid pulmonary nodules.  2. Otherwise, no evidence for metastatic disease in the chest,  abdomen, or pelvis.  3. See dedicated neuroradiology report for the results of the high  resolution PET CT of the neck.   4. Indeterminate left renal cystic lesion. This is likely a  hemorrhagic or proteinaceous cyst, however ultrasound could be used to  determine if this lesion is truly cystic or solid.  5. Chronic appearing mild compression deformity of L1 vertebral body.    Outside records reviewed from: care everywhere      ASSESSMENT and PLAN  Norris Reyes is a 59 year old male scheduled for wide local excision of left buccal mucosa squamous cell carcinoma, left modified radical neck dissection, possible Tracheostomy placement, nasogastric tube placement, right forearm free flap, split thickness skin graft from right thigh on 4/23/2020 by Dr. Dior and Dr. Castañeda in treatment of SCC of left buccal mucosa.  PAC referral for risk  assessment and optimization for anesthesia with comorbid conditions of hypertension:    Pre-operative considerations:  1.  Cardiac:  Functional status- METS >4.  Pt walks 1-2 miles most days.   Intermediate risk surgery with 0.4% (RCRI 0) risk of major adverse cardiac event.   -denies CP, SOB, HERNANDEZ  -hypertension, will hold losartan DOS  -will hold fenofibrate DOS    2.  Pulm:  Airway feasible.  CATHI risk: Intermediate  -never smoker    3.  GI:  Risk of PONV score = 2.  If > 2, anti-emetic intervention recommended.    VTE risk: 3%    Patient is optimized and is acceptable candidate for the proposed procedure.         Eulalia Rosales PA-C  Preoperative Assessment Center  [facility]  [facility]  Phone: [phone]  Fax: [phone]

## 2020-04-15 NOTE — ANESTHESIA PREPROCEDURE EVALUATION
"Anesthesia Pre-Procedure Evaluation    Patient: Norris Reyes   MRN:     [id number] Gender:   male   Age:    59 year old :      1960        Preoperative Diagnosis: * No surgery found *        LABS:  CBC: No results found for: WBC, HGB, HCT, PLT  BMP: No results found for: NA, POTASSIUM, CHLORIDE, CO2, BUN, CR, GLC  COAGS: No results found for: PTT, INR, FIBR  POC: No results found for: BGM, HCG, HCGS  OTHER: No results found for: PH, LACT, A1C, OLGA LIDIA, PHOS, MAG, ALBUMIN, PROTTOTAL, ALT, AST, GGT, ALKPHOS, BILITOTAL, BILIDIRECT, LIPASE, AMYLASE, ANKIT, TSH, T4, T3, CRP, SED     Preop Vitals    BP Readings from Last 3 Encounters:   20 (!) 171/88    Pulse Readings from Last 3 Encounters:   20 82      Resp Readings from Last 3 Encounters:   20 18    SpO2 Readings from Last 3 Encounters:   No data found for SpO2      Temp Readings from Last 1 Encounters:   20 98.2  F (36.8  C)    Ht Readings from Last 1 Encounters:   20 1.88 m (6' 2.02\")      Wt Readings from Last 1 Encounters:   20 91.2 kg (201 lb)    Estimated body mass index is 25.8 kg/m  as calculated from the following:    Height as of 20: 1.88 m (6' 2.02\").    Weight as of 20: 91.2 kg (201 lb).     LDA:        Past Medical History:   Diagnosis Date     Autoimmune disease (H) 2020     Hypertension 2020    Losartan      No past surgical history on file.   No Known Allergies     Anesthesia Evaluation     . Pt has not had prior anesthetic            ROS/MED HX    ENT/Pulmonary:     (+)CATHI risk factors hypertension, , . .    Neurologic:  - neg neurologic ROS     Cardiovascular:     (+) hypertension----. : . . . :. . Previous cardiac testing date:results:date: results:ECG reviewed date: results: date: results:          METS/Exercise Tolerance:  >4 METS   Hematologic:  - neg hematologic  ROS       Musculoskeletal:  - neg musculoskeletal ROS       GI/Hepatic:  - neg GI/hepatic ROS       Renal/Genitourinary: "     (+) Nephrolithiasis ,       Endo:  - neg endo ROS       Psychiatric:  - neg psychiatric ROS       Infectious Disease:  - neg infectious disease ROS       Malignancy:   (+) Malignancy History of Other  Other CA Squamous cell cancer of left buccal mucosa Active status post         Other:    - neg other ROS                     PHYSICAL EXAM:   Mental Status/Neuro: A/A/O; Age Appropriate   Airway: Facies: Feasible  Mallampati: II  Mouth/Opening: Full  TM distance: > 6 cm  Neck ROM: Full   Respiratory: Auscultation: CTAB     Resp. Rate: Normal     Resp. Effort: Normal      CV: Rhythm: Regular  Rate: Age appropriate  Heart: Normal Sounds  Edema: None   Comments:      Dental: Normal Dentition                JZG FV AN PLAN NO PONV RULE       PAC Discussion and Assessment    ASA Classification:   Case is suitable for: [place]  Anesthetic techniques and relevant risks discussed: GA  Invasive monitoring and risk discussed: No  Types:   Possibility and Risk of blood transfusion discussed: Yes  NPO instructions given:   Additional anesthetic preparation and risks discussed:   Needs early admission to pre-op area:   Other:     PAC Resident/NP Anesthesia Assessment:  Norris Reyes is a 59 year old male scheduled forwide local excision of left buccal mucosa squamous cell carcinoma, left modified radical neck dissection, possible Tracheostomy placement, nasogastric tube placement, right forearm free flap, split thickness skin graft from right thigh on 4/23/2020 by Dr. Dior and Dr. Castañeda in treatment of SCC of left buccal mucosa.  PAC referral for risk assessment and optimization for anesthesia with comorbid conditions of hypertension:    Pre-operative considerations:  1.  Cardiac:  Functional status- METS >4.  Pt walks 1-2 miles most days.   Intermediate risk surgery with 0.4% (RCRI 0) risk of major adverse cardiac event.   -denies CP, SOB, HERNANDEZ  -hypertension, will hold losartan DOS  -will hold fenofibrate DOS    2.   Pulm:  Airway feasible.  CATHI risk: Intermediate  -never smoker    3.  GI:  Risk of PONV score = 2.  If > 2, anti-emetic intervention recommended.    VTE risk: 3%    Patient is optimized and is acceptable candidate for the proposed procedure.       **For further details of assessment, testing, and physical exam please see H and P completed on same date.          Eulalia Rosales PA-C, MMS      Reviewed and Signed by PAC Mid-Level Provider/Resident  Mid-Level Provider/Resident: Eulalia Rosales  Date: 4/15/2020  Time:     Attending Anesthesiologist Anesthesia Assessment:        Anesthesiologist:   Date:   Time:   Pass/Fail:   Disposition:     PAC Pharmacist Assessment:        Pharmacist:   Date:   Time:    Eulalia Rosales PA-C

## 2020-04-15 NOTE — PROGRESS NOTES
HPI  INITIAL PATIENT ASSESSMENT    Diagnosis: Buccal Cancer    Prior radiation therapy: None    Prior chemotherapy: None    Prior hormonal therapy:No    Pain Eval:  Denies    Psychosocial  Living arrangements: Lives with family  Fall Risk: independent   referral needs: Not needed    Advanced Directive: No  Implantable Cardiac Device? No      Nurse face-to-face time: Level 5:  over 15 min face to face time  Review of Systems   Constitutional: Negative for chills, diaphoresis, fever, malaise/fatigue and weight loss.   HENT: Negative for congestion, ear discharge, ear pain, hearing loss, nosebleeds, sinus pain, sore throat and tinnitus.    Eyes: Negative for blurred vision, double vision, photophobia, pain, discharge and redness.   Respiratory: Negative for cough, hemoptysis, sputum production, shortness of breath, wheezing and stridor.    Cardiovascular: Negative for chest pain, palpitations, orthopnea, claudication, leg swelling and PND.   Gastrointestinal: Negative for abdominal pain, blood in stool, constipation, diarrhea, heartburn, melena, nausea and vomiting.   Genitourinary: Negative for dysuria, flank pain, frequency, hematuria and urgency.   Musculoskeletal: Negative for back pain, falls, joint pain, myalgias and neck pain.   Skin: Negative for itching and rash.   Neurological: Negative for dizziness, tingling, tremors, sensory change, speech change, focal weakness, seizures, loss of consciousness, weakness and headaches.   Endo/Heme/Allergies: Negative for environmental allergies and polydipsia. Does not bruise/bleed easily.   Psychiatric/Behavioral: Negative for depression, hallucinations, memory loss, substance abuse and suicidal ideas. The patient is nervous/anxious. The patient does not have insomnia.

## 2020-04-15 NOTE — LETTER
4/15/2020      RE: Norris Reyes  [address]  [place] [address]         HPI  INITIAL PATIENT ASSESSMENT    Diagnosis: Buccal Cancer    Prior radiation therapy: None    Prior chemotherapy: None    Prior hormonal therapy:No    Pain Eval:  Denies    Psychosocial  Living arrangements: Lives with family  Fall Risk: independent   referral needs: Not needed    Advanced Directive: No  Implantable Cardiac Device? No      Nurse face-to-face time: Level 5:  over 15 min face to face time  Review of Systems   Constitutional: Negative for chills, diaphoresis, fever, malaise/fatigue and weight loss.   HENT: Negative for congestion, ear discharge, ear pain, hearing loss, nosebleeds, sinus pain, sore throat and tinnitus.    Eyes: Negative for blurred vision, double vision, photophobia, pain, discharge and redness.   Respiratory: Negative for cough, hemoptysis, sputum production, shortness of breath, wheezing and stridor.    Cardiovascular: Negative for chest pain, palpitations, orthopnea, claudication, leg swelling and PND.   Gastrointestinal: Negative for abdominal pain, blood in stool, constipation, diarrhea, heartburn, melena, nausea and vomiting.   Genitourinary: Negative for dysuria, flank pain, frequency, hematuria and urgency.   Musculoskeletal: Negative for back pain, falls, joint pain, myalgias and neck pain.   Skin: Negative for itching and rash.   Neurological: Negative for dizziness, tingling, tremors, sensory change, speech change, focal weakness, seizures, loss of consciousness, weakness and headaches.   Endo/Heme/Allergies: Negative for environmental allergies and polydipsia. Does not bruise/bleed easily.   Psychiatric/Behavioral: Negative for depression, hallucinations, memory loss, substance abuse and suicidal ideas. The patient is nervous/anxious. The patient does not have insomnia.                     Department of Radiation Oncology  [facility]  [address] [place] [address]  [phone]       Consultation Note    Name: Norris Reyes MRN: [id number]   : 1960   Date of Service: 4/15/2020  Referring: Dr. 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 Reyes is a 59 year old male who initially presented to his dentist with worsening erythroplakia of the left buccal mucosa. Biopsy of the left oral mucosal lesion (specimen: [id number]) demonstrated a [id number] moderately differentiated invasive keratinizing squamous cell carcinoma.     A follow-up staging PET/CT performed on 2020 revealed a 1.7 x 1.1 x 3.2 cm hypermetabolic (SUV max 20.5) lesion arising from the left buccal mucosa along with hypermetabolic adenopathy involving left levels IB-III with the largest measuring 2.7 cm (SUVmax 13.4) in left level IB. There was no evidence of contralateral adenopathy nor distant metastatic disease.    Mr. Reyes' case was discussed at the [facility]'s 4/10/2020 interdisciplinary head and neck tumor board with the consensus recommendation to proceed with wide local excision and left neck dissection followed by tumor directed adjuvant therapy. Given the high suspicion that he will require adjuvant radiation therapy, Mr. Reyes was referred here today for a further discussion regarding the use of adjuvant radiotherapy in the treatment of his locally advanced oral cavity cancer.    On exam today, Mr. Reyes' review of systems are unremarkable. He specifically denies any significant oral cavity pain nor does he have any difficulty with p.o. intake. He additionally reports no headaches, otalgia, visual changes, dyspnea, chest pain, nausea/vomiting or abdominal pain.    Past Medical History:    Hypertension    Chemotherapy History:  None    Radiation History:  None    Pregnant: Not Applicable  Implanted Cardiac Devices:  No    Medications:  Losartan  Benadryl  Fenofibrate    Allergies:    NKDA    Social History:  Tobacco: Never smoker  Alcohol: 1 drink daily  Lives in [place], [place]    Family History:    Noncontributory    Review of Systems   A 10-point review of systems was performed. Pertinent findings are noted in the HPI.    Physical Exam   ECOG Status: 0    Vitals:  Weight: 90.7 kg  B/P: 150/87  P: 89  Pain: 0/10    General: 59-year-old healthy-appearing gentleman seated comfortably in an examination chair in no acute distress  HEENT: There is an approximately 3.5-4 cm exophytic mass arising from the left buccal mucosa. Anteriorly, the lesion extends to the second premolar and posteriorly extends to but does not involve the retromolar trigone. Medially, the mass extends into the gingivobuccal recess and onto the gingiva at the level of the second and third molars. Superiorly, the mass extends to the level of the upper dentition but does not involve the upper gingivobuccal sulcus.  Neck: Fullness within left level IB without any firm or fixed adenopathy. No additional adenopathy palpated within the remainder of the left or right neck.  Pulmonary: No wheezing, stridor or respiratory distress  Skin: Normal color and turgor    Imaging/Path/Labs   Imaging: Reviewed    Path: Reviewed    Labs: Reviewed    Assessment    Mr. 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 He is scheduled to undergo wide local excision and left neck dissection followed by tumor-directed adjuvant therapy.    Plan   I had a long discussion with Mr. Reyes in which I reviewed the rationale for the use of adjuvant radiotherapy in patients with high risk disease, namely to reduce the risk of locoregional disease relapse. Given the constellation of clinical examination and radiologic findings, I suspect that he will almost certainly have enough risk factors to support the use of adjuvant radiotherapy. I did  discuss that he may also potentially require concurrent chemotherapy should he have pathologic evidence of positive margins or extranodal extension following surgery.    I briefly reviewed the logistics as well as the potential acute and late-term radiation-induced toxicities associated with head and neck radiotherapy. Mr. Reyes' questions were answered to his stated satisfaction. I will plan to see him back in clinic in approximately 2-3 weeks following surgery to review the pathologic findings and determine a plan of care moving forward. In the interim, he was provided with our clinic's contact information and given instructions to call or to return to clinic at any time should he have additional questions or concerns.    Dwayne Kurtz MD/PhD    Dept of Radiation Oncology  [facility]     Dwayne Kurtz MD

## 2020-04-15 NOTE — PATIENT INSTRUCTIONS
Preparing for Your Surgery      Name:  Norris Reyes   MRN:  [id number]   :  1960   Today's Date:  4/15/2020     Arriving for surgery:  Surgery date:  20  Arrival time:  5:45 am  Due to the COVID 19 crisis, we are trying to keep our patients safe from others who might have respiratory illnesses so the hospital is implementing a no visitor policy.  Please come to:       [facility] [address] [place] [address]  [id number]    - ? parking is available in front of the hospital      -    Please proceed to the Surgery Lounge on the 3rd floor. [phone]?     - ?If you are in need of directions, wheelchair or escort please stop at the Information Desk in the lobby.  Inform the information person that you are here for surgery; a wheelchair and escort will be provided to the Surgery Lounge .?     What can I eat or drink?  -  You may have solid food or milk products until 8 hours prior to your surgery (11:45 pm).  -  You may have water, apple juice or 7up/Sprite until 2 hours prior to your surgery (5:45 am).    Which medicines can I take?  Hold Aspirin, vitamins and supplements one week prior to surgery.  Hold Ibuprofen for 24 hours and/or Naproxen for 48 hours prior to surgery.     -  Do NOT take these medications in the morning, the day of surgery:  Losartan (Cozaar)    How do I prepare myself?  -  Take two showers: one the night before surgery; and one the morning of surgery.         Use Scrubcare or Hibiclens to wash from neck down, leave soap on your skin for up to one minute.  Do not get soap in your eyes or ears.  You may use your own shampoo and conditioner; no other hair products.   -  Do NOT use lotion, powder, deodorant, or antiperspirant the day of your surgery.  -  Do NOT wear any jewelry.  -  Do not bring your own medications to the hospital.  -  Bring your ID and insurance card.    Questions or Concerns:  -If you are scheduled on the [facility] or  [facility] and have questions or concerns regarding the day of surgery, please call Preadmission Nursing at [phone].     -If you have health changes between today and your surgery please call your surgeon. For questions after surgery please call your surgeons office.     AFTER YOUR SURGERY  Breathing exercises   Breathing exercises help you recover faster. Take deep breaths and let the air out slowly. This will:     Help you wake up after surgery.    Help prevent complications like pneumonia.  Preventing complications will help you go home sooner.   We may give you a breathing device (incentive spirometer) to encourage you to breathe deeply.   Nausea and vomiting   You may feel sick to your stomach after surgery; if so, let your nurse know.    Pain control:  After surgery, you may have pain. Our goal is to help you manage your pain. Pain medicine will help you feel comfortable enough to do activities that will help you heal.  These activities may include breathing exercises, walking and physical therapy.   To help your health care team treat your pain we will ask: 1) If you have pain  2) where it is located 3) describe your pain in your words  Methods of pain control include medications given by mouth, vein or by nerve block for some surgeries.  Sequential Compression Device (SCD):  You may need to wear SCD S (also called pneumo boots)on your legs or feet. These are wraps connected to a machine that pumps in air and releases it. The repeated pumping helps prevent blood clots from forming.

## 2020-04-16 ENCOUNTER — TELEPHONE (OUTPATIENT)
Dept: OTOLARYNGOLOGY | Facility: CLINIC | Age: 60
End: 2020-04-16

## 2020-04-16 NOTE — PROGRESS NOTES
Department of Radiation Oncology  [address] [address]  [phone]       Consultation Note    Name: Norris Reyes MRN: [id number]   : 1960   Date of Service: 4/15/2020  Referring: Dr. 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 Reyes is a 59 year old male who initially presented to his dentist with worsening erythroplakia of the left buccal mucosa. Biopsy of the left oral mucosal lesion (specimen: [id number]) demonstrated a [id number] moderately differentiated invasive keratinizing squamous cell carcinoma.     A follow-up staging PET/CT performed on 2020 revealed a 1.7 x 1.1 x 3.2 cm hypermetabolic (SUV max 20.5) lesion arising from the left buccal mucosa along with hypermetabolic adenopathy involving left levels IB-III with the largest measuring 2.7 cm (SUVmax 13.4) in left level IB. There was no evidence of contralateral adenopathy nor distant metastatic disease.    Mr. Reyes' case was discussed at the [facility]'s 4/10/2020 interdisciplinary head and neck tumor board with the consensus recommendation to proceed with wide local excision and left neck dissection followed by tumor directed adjuvant therapy. Given the high suspicion that he will require adjuvant radiation therapy, Mr. Reyes was referred here today for a further discussion regarding the use of adjuvant radiotherapy in the treatment of his locally advanced oral cavity cancer.    On exam today, Mr. Reyes' review of systems are unremarkable. He specifically denies any significant oral cavity pain nor does he have any difficulty with p.o. intake. He additionally reports no headaches, otalgia, visual changes, dyspnea, chest pain, nausea/vomiting or abdominal pain.    Past Medical History:    Hypertension    Chemotherapy  History:  None    Radiation History:  None    Pregnant: Not Applicable  Implanted Cardiac Devices: No    Medications:  Losartan  Benadryl  Fenofibrate    Allergies:    NKDA    Social History:  Tobacco: Never smoker  Alcohol: 1 drink daily  Lives in [place], [place]    Family History:    Noncontributory    Review of Systems   A 10-point review of systems was performed. Pertinent findings are noted in the HPI.    Physical Exam   ECOG Status: 0    Vitals:  Weight: 90.7 kg  B/P: 150/87  P: 89  Pain: 0/10    General: 59-year-old healthy-appearing gentleman seated comfortably in an examination chair in no acute distress  HEENT: There is an approximately 3.5-4 cm exophytic mass arising from the left buccal mucosa. Anteriorly, the lesion extends to the second premolar and posteriorly extends to but does not involve the retromolar trigone. Medially, the mass extends into the gingivobuccal recess and onto the gingiva at the level of the second and third molars. Superiorly, the mass extends to the level of the upper dentition but does not involve the upper gingivobuccal sulcus.  Neck: Fullness within left level IB without any firm or fixed adenopathy. No additional adenopathy palpated within the remainder of the left or right neck.  Pulmonary: No wheezing, stridor or respiratory distress  Skin: Normal color and turgor    Imaging/Path/Labs   Imaging: Reviewed    Path: Reviewed    Labs: Reviewed    Assessment    Mr. 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 He is scheduled to undergo wide local excision and left neck dissection followed by tumor-directed adjuvant therapy.    Plan   I had a long discussion with Mr. Reyes in which I reviewed the rationale for the use of adjuvant radiotherapy in patients with high risk disease, namely to reduce the risk of locoregional disease relapse. Given the constellation of clinical examination and radiologic findings, I suspect that  he will almost certainly have enough risk factors to support the use of adjuvant radiotherapy. I did discuss that he may also potentially require concurrent chemotherapy should he have pathologic evidence of positive margins or extranodal extension following surgery.    I briefly reviewed the logistics as well as the potential acute and late-term radiation-induced toxicities associated with head and neck radiotherapy. Mr. Reyes' questions were answered to his stated satisfaction. I will plan to see him back in clinic in approximately 2-3 weeks following surgery to review the pathologic findings and determine a plan of care moving forward. In the interim, he was provided with our clinic's contact information and given instructions to call or to return to clinic at any time should he have additional questions or concerns.    Dwayne Kurtz MD/PhD    Dept of Radiation Oncology  [facility]

## 2020-04-16 NOTE — TELEPHONE ENCOUNTER
Left message regarding setting up testing (COVID19) prior to surgery.   Asked patient to call back as soon as possible. Direct number left for patient.       Destiny Rehman   Perioperative Coordinator   Department of Otolaryngology    Office: [phone]

## 2020-04-16 NOTE — TELEPHONE ENCOUNTER
Talked to patient regarding setting up pre-op testing for patient. Informed patient that he will need to arrive to our [place] Location for pre-op COVID-19 testing.   Explained that patient should arrive with ID and phone. Confirmed phone number [phone]. Will arrive in [address] [facility]. Patient expresses that him and his wife have been self quarantining for weeks.     Scheduled for 4/20/2020 at 10:00am.   Patient wishes to have [employer] sent with this information.

## 2020-04-16 NOTE — TELEPHONE ENCOUNTER
Patient called back stating that he did his e-check in and they asked for a copay amount. Patient stated that per [facility] policy that there is no copay for COVID19 tested ordered by a provider.     Message sent to financial counselor.       Destiny Rehman   Perioperative Coordinator   Department of Otolaryngology    Office: [phone]

## 2020-04-20 ENCOUNTER — OFFICE VISIT (OUTPATIENT)
Dept: URGENT CARE | Facility: URGENT CARE | Age: 60
End: 2020-04-20
Payer: COMMERCIAL

## 2020-04-20 DIAGNOSIS — Z20.822 SUSPECTED COVID-19 VIRUS INFECTION: Primary | ICD-10-CM

## 2020-04-20 LAB
SARS-COV-2 PCR COMMENT: NORMAL
SARS-COV-2 RNA SPEC QL NAA+PROBE: NEGATIVE
SARS-COV-2 RNA SPEC QL NAA+PROBE: NORMAL
SPECIMEN SOURCE: NORMAL
SPECIMEN SOURCE: NORMAL

## 2020-04-20 PROCEDURE — 87635 SARS-COV-2 COVID-19 AMP PRB: CPT | Performed by: FAMILY MEDICINE

## 2020-04-20 PROCEDURE — 99207 ZZC NO BILLABLE SERVICE THIS VISIT: CPT

## 2020-04-20 NOTE — PATIENT INSTRUCTIONS
Please use the information at the end of this document to sign up for [facility] where you can get your results and a message about those results sent to you through the [employer] application. If you do not have mychart we will call you with your results but it may take longer.    Regardless of if you have been tested or not:  Patient who have symptoms (cough, fever, or shortness of breath), need to isolate for 7 days from when symptoms started AND 72 hours after fever resolves (without fever reducing medications) AND improvement of respiratory symptoms (whichever is longer).      Isolate yourself at home (in own room/own bathroom if possible)    Do Not allow any visitors    Do Not go to work or school    Do Not go to [employer],  centers, shopping, or other public places.    Do Not shake hands.    Avoid close and intimate contact with others (hugging, kissing).    Follow CDC recommendations for household cleaning of frequently touched services.     After the initial 7 days, continue to isolate yourself from household members as much as possible. To continue decrease the risk of community spread and exposure, you and any members of your household should limit activities in public for 14 days after starting home isolation.     You can reference the following CDC link for helpful home isolation/care tips:  https://www.cdc.gov/coronavirus/2019-ncov/downloads/10Things.pdf    Protect Others:    Cover Your Mouth and Nose with a mask, disposable tissue or wash cloth to avoid spreading germs to others.    Wash your hands and face frequently with soap and water    Call Back If: Breathing difficulty develops or you become worse.    For more information about COVID19 and options for caring for yourself at home, please visit the CDC website at https://www.cdc.gov/coronavirus/2019-ncov/about/steps-when-sick.html  For more options for care at [facility], please visit our website at  [email or web address]

## 2020-04-22 ENCOUNTER — PATIENT OUTREACH (OUTPATIENT)
Dept: OTOLARYNGOLOGY | Facility: CLINIC | Age: 60
End: 2020-04-22

## 2020-04-23 ENCOUNTER — HOSPITAL ENCOUNTER (INPATIENT)
Facility: CLINIC | Age: 60
LOS: 6 days | Discharge: HOME | DRG: 129 | End: 2020-04-29
Attending: OTOLARYNGOLOGY | Admitting: OTOLARYNGOLOGY
Payer: COMMERCIAL

## 2020-04-23 ENCOUNTER — ANESTHESIA EVENT (OUTPATIENT)
Dept: SURGERY | Facility: CLINIC | Age: 60
DRG: 129 | End: 2020-04-23
Payer: COMMERCIAL

## 2020-04-23 ENCOUNTER — ANESTHESIA (OUTPATIENT)
Dept: SURGERY | Facility: CLINIC | Age: 60
DRG: 129 | End: 2020-04-23
Payer: COMMERCIAL

## 2020-04-23 ENCOUNTER — APPOINTMENT (OUTPATIENT)
Dept: GENERAL RADIOLOGY | Facility: CLINIC | Age: 60
DRG: 129 | End: 2020-04-23
Attending: STUDENT IN AN ORGANIZED HEALTH CARE EDUCATION/TRAINING PROGRAM
Payer: COMMERCIAL

## 2020-04-23 ENCOUNTER — APPOINTMENT (OUTPATIENT)
Dept: GENERAL RADIOLOGY | Facility: CLINIC | Age: 60
DRG: 129 | End: 2020-04-23
Attending: OTOLARYNGOLOGY
Payer: COMMERCIAL

## 2020-04-23 DIAGNOSIS — Z98.890 S/P FLAP GRAFT: ICD-10-CM

## 2020-04-23 DIAGNOSIS — C06.0 SQUAMOUS CELL CANCER OF BUCCAL MUCOSA (H): ICD-10-CM

## 2020-04-23 DIAGNOSIS — C06.0 SQUAMOUS CELL CANCER OF BUCCAL MUCOSA (H): Primary | ICD-10-CM

## 2020-04-23 PROBLEM — C06.9 ORAL CANCER (H): Status: ACTIVE | Noted: 2020-04-23

## 2020-04-23 LAB
ABO + RH BLD: NORMAL
ABO + RH BLD: NORMAL
ANION GAP SERPL CALCULATED.3IONS-SCNC: 5 MMOL/L (ref 3–14)
BACTERIA SPEC CULT: NORMAL
BACTERIA SPEC CULT: NORMAL
BASE DEFICIT BLDA-SCNC: 0.2 MMOL/L
BASE EXCESS BLDA CALC-SCNC: 1.1 MMOL/L
BASE EXCESS BLDA CALC-SCNC: 1.5 MMOL/L
BLD GP AB SCN SERPL QL: NORMAL
BLD PROD TYP BPU: NORMAL
BLOOD BANK CMNT PATIENT-IMP: NORMAL
BLOOD BANK CMNT PATIENT-IMP: NORMAL
BUN SERPL-MCNC: 17 MG/DL (ref 7–30)
CA-I BLD-MCNC: 4.8 MG/DL (ref 4.4–5.2)
CA-I BLD-MCNC: 5.2 MG/DL (ref 4.4–5.2)
CALCIUM SERPL-MCNC: 9.7 MG/DL (ref 8.5–10.1)
CHLORIDE SERPL-SCNC: 107 MMOL/L (ref 94–109)
CO2 SERPL-SCNC: 26 MMOL/L (ref 20–32)
CREAT SERPL-MCNC: 1.2 MG/DL (ref 0.66–1.25)
ERYTHROCYTE [DISTWIDTH] IN BLOOD BY AUTOMATED COUNT: 12.1 % (ref 10–15)
GFR SERPL CREATININE-BSD FRML MDRD: 66 ML/MIN/{1.73_M2}
GLUCOSE BLD-MCNC: 135 MG/DL (ref 70–99)
GLUCOSE BLD-MCNC: 150 MG/DL (ref 70–99)
GLUCOSE BLDC GLUCOMTR-MCNC: 105 MG/DL (ref 70–99)
GLUCOSE BLDC GLUCOMTR-MCNC: 132 MG/DL (ref 70–99)
GLUCOSE BLDC GLUCOMTR-MCNC: 133 MG/DL (ref 70–99)
GLUCOSE BLDC GLUCOMTR-MCNC: 141 MG/DL (ref 70–99)
GLUCOSE BLDC GLUCOMTR-MCNC: 142 MG/DL (ref 70–99)
GLUCOSE BLDC GLUCOMTR-MCNC: 147 MG/DL (ref 70–99)
GLUCOSE BLDC GLUCOMTR-MCNC: 154 MG/DL (ref 70–99)
GLUCOSE SERPL-MCNC: 149 MG/DL (ref 70–99)
HCO3 BLD-SCNC: 24 MMOL/L (ref 21–28)
HCO3 BLD-SCNC: 25 MMOL/L (ref 21–28)
HCO3 BLD-SCNC: 25 MMOL/L (ref 21–28)
HCT VFR BLD AUTO: 40.8 % (ref 40–53)
HGB BLD-MCNC: 13.4 G/DL (ref 13.3–17.7)
HGB BLD-MCNC: 13.7 G/DL (ref 13.3–17.7)
HGB BLD-MCNC: 14.1 G/DL (ref 13.3–17.7)
LACTATE BLD-SCNC: 1 MMOL/L (ref 0.7–2)
LACTATE BLD-SCNC: 1.3 MMOL/L (ref 0.7–2)
MAGNESIUM SERPL-MCNC: 2.2 MG/DL (ref 1.6–2.3)
MCH RBC QN AUTO: 28.9 PG (ref 26.5–33)
MCHC RBC AUTO-ENTMCNC: 32.8 G/DL (ref 31.5–36.5)
MCV RBC AUTO: 88 FL (ref 78–100)
MRSA DNA SPEC QL NAA+PROBE: NEGATIVE
NUM BPU REQUESTED: 2
O2/TOTAL GAS SETTING VFR VENT: 21 %
O2/TOTAL GAS SETTING VFR VENT: 22 %
O2/TOTAL GAS SETTING VFR VENT: 50 %
OXYHGB MFR BLD: 97 % (ref 92–100)
PCO2 BLD: 36 MM HG (ref 35–45)
PCO2 BLD: 37 MM HG (ref 35–45)
PCO2 BLD: 39 MM HG (ref 35–45)
PH BLD: 7.42 PH (ref 7.35–7.45)
PH BLD: 7.43 PH (ref 7.35–7.45)
PH BLD: 7.46 PH (ref 7.35–7.45)
PHOSPHATE SERPL-MCNC: 2.1 MG/DL (ref 2.5–4.5)
PLATELET # BLD AUTO: 153 10E9/L (ref 150–450)
PO2 BLD: 102 MM HG (ref 80–105)
PO2 BLD: 72 MM HG (ref 80–105)
PO2 BLD: 96 MM HG (ref 80–105)
POTASSIUM BLD-SCNC: 4.4 MMOL/L (ref 3.4–5.3)
POTASSIUM BLD-SCNC: 4.6 MMOL/L (ref 3.4–5.3)
POTASSIUM SERPL-SCNC: 4.3 MMOL/L (ref 3.4–5.3)
RBC # BLD AUTO: 4.63 10E12/L (ref 4.4–5.9)
SODIUM BLD-SCNC: 136 MMOL/L (ref 133–144)
SODIUM BLD-SCNC: 137 MMOL/L (ref 133–144)
SODIUM SERPL-SCNC: 138 MMOL/L (ref 133–144)
SPECIMEN EXP DATE BLD: NORMAL
SPECIMEN SOURCE: NORMAL
SPECIMEN SOURCE: NORMAL
WBC # BLD AUTO: 11.3 10E9/L (ref 4–11)

## 2020-04-23 PROCEDURE — 25000128 H RX IP 250 OP 636: Performed by: STUDENT IN AN ORGANIZED HEALTH CARE EDUCATION/TRAINING PROGRAM

## 2020-04-23 PROCEDURE — 40000986 XR CHEST PORT 1 VW

## 2020-04-23 PROCEDURE — 36000068 ZZH SURGERY LEVEL 5 1ST 30 MIN - UMMC: Performed by: OTOLARYNGOLOGY

## 2020-04-23 PROCEDURE — 88309 TISSUE EXAM BY PATHOLOGIST: CPT | Performed by: OTOLARYNGOLOGY

## 2020-04-23 PROCEDURE — 88305 TISSUE EXAM BY PATHOLOGIST: CPT | Performed by: OTOLARYNGOLOGY

## 2020-04-23 PROCEDURE — 71045 X-RAY EXAM CHEST 1 VIEW: CPT

## 2020-04-23 PROCEDURE — 84295 ASSAY OF SERUM SODIUM: CPT | Performed by: ANESTHESIOLOGY

## 2020-04-23 PROCEDURE — 25000125 ZZHC RX 250: Performed by: NURSE ANESTHETIST, CERTIFIED REGISTERED

## 2020-04-23 PROCEDURE — 40000275 ZZH STATISTIC RCP TIME EA 10 MIN

## 2020-04-23 PROCEDURE — 0JBG0ZZ EXCISION OF RIGHT LOWER ARM SUBCUTANEOUS TISSUE AND FASCIA, OPEN APPROACH: ICD-10-PCS | Performed by: OTOLARYNGOLOGY

## 2020-04-23 PROCEDURE — 07T20ZZ RESECTION OF LEFT NECK LYMPHATIC, OPEN APPROACH: ICD-10-PCS | Performed by: OTOLARYNGOLOGY

## 2020-04-23 PROCEDURE — 82947 ASSAY GLUCOSE BLOOD QUANT: CPT | Performed by: ANESTHESIOLOGY

## 2020-04-23 PROCEDURE — 25000128 H RX IP 250 OP 636: Performed by: OTOLARYNGOLOGY

## 2020-04-23 PROCEDURE — 88311 DECALCIFY TISSUE: CPT | Performed by: OTOLARYNGOLOGY

## 2020-04-23 PROCEDURE — 0CDWXZ1 EXTRACTION OF UPPER TOOTH, MULTIPLE, EXTERNAL APPROACH: ICD-10-PCS | Performed by: DENTIST

## 2020-04-23 PROCEDURE — 0CR407Z REPLACEMENT OF BUCCAL MUCOSA WITH AUTOLOGOUS TISSUE SUBSTITUTE, OPEN APPROACH: ICD-10-PCS | Performed by: OTOLARYNGOLOGY

## 2020-04-23 PROCEDURE — 0CB40ZZ EXCISION OF BUCCAL MUCOSA, OPEN APPROACH: ICD-10-PCS | Performed by: OTOLARYNGOLOGY

## 2020-04-23 PROCEDURE — 25000131 ZZH RX MED GY IP 250 OP 636 PS 637: Performed by: OTOLARYNGOLOGY

## 2020-04-23 PROCEDURE — 87081 CULTURE SCREEN ONLY: CPT | Performed by: OTOLARYNGOLOGY

## 2020-04-23 PROCEDURE — 25000128 H RX IP 250 OP 636: Performed by: NURSE ANESTHETIST, CERTIFIED REGISTERED

## 2020-04-23 PROCEDURE — 25000132 ZZH RX MED GY IP 250 OP 250 PS 637: Performed by: ANESTHESIOLOGY

## 2020-04-23 PROCEDURE — 85027 COMPLETE CBC AUTOMATED: CPT | Performed by: STUDENT IN AN ORGANIZED HEALTH CARE EDUCATION/TRAINING PROGRAM

## 2020-04-23 PROCEDURE — 00BK0ZZ EXCISION OF TRIGEMINAL NERVE, OPEN APPROACH: ICD-10-PCS | Performed by: OTOLARYNGOLOGY

## 2020-04-23 PROCEDURE — 87641 MR-STAPH DNA AMP PROBE: CPT | Performed by: STUDENT IN AN ORGANIZED HEALTH CARE EDUCATION/TRAINING PROGRAM

## 2020-04-23 PROCEDURE — 82803 BLOOD GASES ANY COMBINATION: CPT | Performed by: ANESTHESIOLOGY

## 2020-04-23 PROCEDURE — 36000070 ZZH SURGERY LEVEL 5 EA 15 ADDTL MIN - UMMC: Performed by: OTOLARYNGOLOGY

## 2020-04-23 PROCEDURE — 20000004 ZZH R&B ICU UMMC

## 2020-04-23 PROCEDURE — 0HRDX74 REPLACEMENT OF RIGHT LOWER ARM SKIN WITH AUTOLOGOUS TISSUE SUBSTITUTE, PARTIAL THICKNESS, EXTERNAL APPROACH: ICD-10-PCS | Performed by: OTOLARYNGOLOGY

## 2020-04-23 PROCEDURE — 0JR507Z REPLACEMENT OF LEFT NECK SUBCUTANEOUS TISSUE AND FASCIA WITH AUTOLOGOUS TISSUE SUBSTITUTE, OPEN APPROACH: ICD-10-PCS | Performed by: OTOLARYNGOLOGY

## 2020-04-23 PROCEDURE — 25000125 ZZHC RX 250: Performed by: STUDENT IN AN ORGANIZED HEALTH CARE EDUCATION/TRAINING PROGRAM

## 2020-04-23 PROCEDURE — 0HBHXZZ EXCISION OF RIGHT UPPER LEG SKIN, EXTERNAL APPROACH: ICD-10-PCS | Performed by: OTOLARYNGOLOGY

## 2020-04-23 PROCEDURE — 00000146 ZZHCL STATISTIC GLUCOSE BY METER IP

## 2020-04-23 PROCEDURE — 88331 PATH CONSLTJ SURG 1 BLK 1SPC: CPT | Performed by: OTOLARYNGOLOGY

## 2020-04-23 PROCEDURE — 87640 STAPH A DNA AMP PROBE: CPT | Performed by: STUDENT IN AN ORGANIZED HEALTH CARE EDUCATION/TRAINING PROGRAM

## 2020-04-23 PROCEDURE — 94002 VENT MGMT INPAT INIT DAY: CPT

## 2020-04-23 PROCEDURE — 25800030 ZZH RX IP 258 OP 636: Performed by: NURSE ANESTHETIST, CERTIFIED REGISTERED

## 2020-04-23 PROCEDURE — 25000128 H RX IP 250 OP 636

## 2020-04-23 PROCEDURE — 40000986 XR ABDOMEN PORT 1 VW

## 2020-04-23 PROCEDURE — 83735 ASSAY OF MAGNESIUM: CPT | Performed by: STUDENT IN AN ORGANIZED HEALTH CARE EDUCATION/TRAINING PROGRAM

## 2020-04-23 PROCEDURE — 84132 ASSAY OF SERUM POTASSIUM: CPT | Performed by: ANESTHESIOLOGY

## 2020-04-23 PROCEDURE — 27810169 ZZH OR IMPLANT GENERAL: Performed by: OTOLARYNGOLOGY

## 2020-04-23 PROCEDURE — 27210794 ZZH OR GENERAL SUPPLY STERILE: Performed by: OTOLARYNGOLOGY

## 2020-04-23 PROCEDURE — 83605 ASSAY OF LACTIC ACID: CPT | Performed by: ANESTHESIOLOGY

## 2020-04-23 PROCEDURE — 82330 ASSAY OF CALCIUM: CPT | Performed by: ANESTHESIOLOGY

## 2020-04-23 PROCEDURE — 99291 CRITICAL CARE FIRST HOUR: CPT | Mod: GC | Performed by: INTERNAL MEDICINE

## 2020-04-23 PROCEDURE — 88307 TISSUE EXAM BY PATHOLOGIST: CPT | Performed by: OTOLARYNGOLOGY

## 2020-04-23 PROCEDURE — 25800030 ZZH RX IP 258 OP 636: Performed by: OTOLARYNGOLOGY

## 2020-04-23 PROCEDURE — 40000170 ZZH STATISTIC PRE-PROCEDURE ASSESSMENT II: Performed by: OTOLARYNGOLOGY

## 2020-04-23 PROCEDURE — 25000566 ZZH SEVOFLURANE, EA 15 MIN: Performed by: OTOLARYNGOLOGY

## 2020-04-23 PROCEDURE — 25000128 H RX IP 250 OP 636: Performed by: ANESTHESIOLOGY

## 2020-04-23 PROCEDURE — 40000014 ZZH STATISTIC ARTERIAL MONITORING DAILY

## 2020-04-23 PROCEDURE — 37000009 ZZH ANESTHESIA TECHNICAL FEE, EACH ADDTL 15 MIN: Performed by: OTOLARYNGOLOGY

## 2020-04-23 PROCEDURE — 80048 BASIC METABOLIC PNL TOTAL CA: CPT | Performed by: STUDENT IN AN ORGANIZED HEALTH CARE EDUCATION/TRAINING PROGRAM

## 2020-04-23 PROCEDURE — 37000008 ZZH ANESTHESIA TECHNICAL FEE, 1ST 30 MIN: Performed by: OTOLARYNGOLOGY

## 2020-04-23 PROCEDURE — 25000125 ZZHC RX 250: Performed by: OTOLARYNGOLOGY

## 2020-04-23 PROCEDURE — 84100 ASSAY OF PHOSPHORUS: CPT | Performed by: STUDENT IN AN ORGANIZED HEALTH CARE EDUCATION/TRAINING PROGRAM

## 2020-04-23 PROCEDURE — 0NBV0ZZ EXCISION OF LEFT MANDIBLE, OPEN APPROACH: ICD-10-PCS | Performed by: OTOLARYNGOLOGY

## 2020-04-23 PROCEDURE — 25800030 ZZH RX IP 258 OP 636: Performed by: STUDENT IN AN ORGANIZED HEALTH CARE EDUCATION/TRAINING PROGRAM

## 2020-04-23 PROCEDURE — 82805 BLOOD GASES W/O2 SATURATION: CPT | Performed by: STUDENT IN AN ORGANIZED HEALTH CARE EDUCATION/TRAINING PROGRAM

## 2020-04-23 DEVICE — IMP DEVICE ANASTOMOTIC 3.5MM COUPLER PURPLE GEM2755: Type: IMPLANTABLE DEVICE | Site: NECK | Status: FUNCTIONAL

## 2020-04-23 DEVICE — IMP PROBE DOPPLER FLOW STD CUFF DP-SDP001: Type: IMPLANTABLE DEVICE | Site: NECK | Status: FUNCTIONAL

## 2020-04-23 DEVICE — IMP DEVICE ANASTOMOTIC 3.0MM COUPLER GOLD GEM2754: Type: IMPLANTABLE DEVICE | Site: NECK | Status: FUNCTIONAL

## 2020-04-23 RX ORDER — EPHEDRINE SULFATE 50 MG/ML
INJECTION, SOLUTION INTRAMUSCULAR; INTRAVENOUS; SUBCUTANEOUS PRN
Status: DISCONTINUED | OUTPATIENT
Start: 2020-04-23 | End: 2020-04-23

## 2020-04-23 RX ORDER — POTASSIUM CHLORIDE 750 MG/1
20-40 TABLET, EXTENDED RELEASE ORAL
Status: DISCONTINUED | OUTPATIENT
Start: 2020-04-23 | End: 2020-04-29 | Stop reason: HOSPADM

## 2020-04-23 RX ORDER — POTASSIUM CHLORIDE 1.5 G/1.58G
20-40 POWDER, FOR SOLUTION ORAL
Status: DISCONTINUED | OUTPATIENT
Start: 2020-04-23 | End: 2020-04-29 | Stop reason: HOSPADM

## 2020-04-23 RX ORDER — MAGNESIUM SULFATE HEPTAHYDRATE 40 MG/ML
2 INJECTION, SOLUTION INTRAVENOUS DAILY PRN
Status: DISCONTINUED | OUTPATIENT
Start: 2020-04-23 | End: 2020-04-29 | Stop reason: HOSPADM

## 2020-04-23 RX ORDER — MAGNESIUM HYDROXIDE 1200 MG/15ML
LIQUID ORAL PRN
Status: DISCONTINUED | OUTPATIENT
Start: 2020-04-23 | End: 2020-04-23 | Stop reason: HOSPADM

## 2020-04-23 RX ORDER — SODIUM CHLORIDE, SODIUM GLUCONATE, SODIUM ACETATE, POTASSIUM CHLORIDE AND MAGNESIUM CHLORIDE 526; 502; 368; 37; 30 MG/100ML; MG/100ML; MG/100ML; MG/100ML; MG/100ML
INJECTION, SOLUTION INTRAVENOUS CONTINUOUS PRN
Status: DISCONTINUED | OUTPATIENT
Start: 2020-04-23 | End: 2020-04-23

## 2020-04-23 RX ORDER — GABAPENTIN 300 MG/1
300 CAPSULE ORAL ONCE
Status: COMPLETED | OUTPATIENT
Start: 2020-04-23 | End: 2020-04-23

## 2020-04-23 RX ORDER — PROPOFOL 10 MG/ML
INJECTION, EMULSION INTRAVENOUS PRN
Status: DISCONTINUED | OUTPATIENT
Start: 2020-04-23 | End: 2020-04-23

## 2020-04-23 RX ORDER — ONDANSETRON 2 MG/ML
4 INJECTION INTRAMUSCULAR; INTRAVENOUS EVERY 6 HOURS PRN
Status: DISCONTINUED | OUTPATIENT
Start: 2020-04-23 | End: 2020-04-25

## 2020-04-23 RX ORDER — DEXTROSE MONOHYDRATE 100 MG/ML
INJECTION, SOLUTION INTRAVENOUS CONTINUOUS PRN
Status: DISCONTINUED | OUTPATIENT
Start: 2020-04-23 | End: 2020-04-29 | Stop reason: HOSPADM

## 2020-04-23 RX ORDER — GLYCOPYRROLATE 0.2 MG/ML
INJECTION, SOLUTION INTRAMUSCULAR; INTRAVENOUS PRN
Status: DISCONTINUED | OUTPATIENT
Start: 2020-04-23 | End: 2020-04-23

## 2020-04-23 RX ORDER — DEXAMETHASONE SODIUM PHOSPHATE 4 MG/ML
8 INJECTION, SOLUTION INTRA-ARTICULAR; INTRALESIONAL; INTRAMUSCULAR; INTRAVENOUS; SOFT TISSUE EVERY 8 HOURS
Status: DISCONTINUED | OUTPATIENT
Start: 2020-04-24 | End: 2020-04-24

## 2020-04-23 RX ORDER — PAPAVERINE HYDROCHLORIDE 30 MG/ML
INJECTION INTRAMUSCULAR; INTRAVENOUS PRN
Status: DISCONTINUED | OUTPATIENT
Start: 2020-04-23 | End: 2020-04-23 | Stop reason: HOSPADM

## 2020-04-23 RX ORDER — NALOXONE HYDROCHLORIDE 0.4 MG/ML
.1-.4 INJECTION, SOLUTION INTRAMUSCULAR; INTRAVENOUS; SUBCUTANEOUS
Status: DISCONTINUED | OUTPATIENT
Start: 2020-04-23 | End: 2020-04-23

## 2020-04-23 RX ORDER — ASPIRIN 325 MG
325 TABLET ORAL DAILY
Status: DISCONTINUED | OUTPATIENT
Start: 2020-04-24 | End: 2020-04-29 | Stop reason: HOSPADM

## 2020-04-23 RX ORDER — PROPOFOL 10 MG/ML
INJECTION, EMULSION INTRAVENOUS
Status: COMPLETED
Start: 2020-04-23 | End: 2020-04-23

## 2020-04-23 RX ORDER — FENTANYL CITRATE 50 UG/ML
50 INJECTION, SOLUTION INTRAMUSCULAR; INTRAVENOUS ONCE
Status: COMPLETED | OUTPATIENT
Start: 2020-04-23 | End: 2020-04-23

## 2020-04-23 RX ORDER — ACETAMINOPHEN 500 MG
1000 TABLET ORAL ONCE
Status: COMPLETED | OUTPATIENT
Start: 2020-04-23 | End: 2020-04-23

## 2020-04-23 RX ORDER — CHLORHEXIDINE GLUCONATE ORAL RINSE 1.2 MG/ML
15 SOLUTION DENTAL EVERY 8 HOURS
Status: DISCONTINUED | OUTPATIENT
Start: 2020-04-24 | End: 2020-04-23

## 2020-04-23 RX ORDER — SODIUM CHLORIDE, SODIUM LACTATE, POTASSIUM CHLORIDE, CALCIUM CHLORIDE 600; 310; 30; 20 MG/100ML; MG/100ML; MG/100ML; MG/100ML
INJECTION, SOLUTION INTRAVENOUS CONTINUOUS PRN
Status: DISCONTINUED | OUTPATIENT
Start: 2020-04-23 | End: 2020-04-23

## 2020-04-23 RX ORDER — MINERAL OIL
OIL (ML) MISCELLANEOUS PRN
Status: DISCONTINUED | OUTPATIENT
Start: 2020-04-23 | End: 2020-04-23 | Stop reason: HOSPADM

## 2020-04-23 RX ORDER — SODIUM CHLORIDE, SODIUM LACTATE, POTASSIUM CHLORIDE, CALCIUM CHLORIDE 600; 310; 30; 20 MG/100ML; MG/100ML; MG/100ML; MG/100ML
INJECTION, SOLUTION INTRAVENOUS CONTINUOUS
Status: DISCONTINUED | OUTPATIENT
Start: 2020-04-23 | End: 2020-04-25

## 2020-04-23 RX ORDER — LIDOCAINE HYDROCHLORIDE AND EPINEPHRINE 10; 10 MG/ML; UG/ML
INJECTION, SOLUTION INFILTRATION; PERINEURAL PRN
Status: DISCONTINUED | OUTPATIENT
Start: 2020-04-23 | End: 2020-04-23 | Stop reason: HOSPADM

## 2020-04-23 RX ORDER — GINSENG 100 MG
CAPSULE ORAL EVERY 8 HOURS
Status: ACTIVE | OUTPATIENT
Start: 2020-04-23 | End: 2020-04-25

## 2020-04-23 RX ORDER — NALOXONE HYDROCHLORIDE 0.4 MG/ML
.1-.4 INJECTION, SOLUTION INTRAMUSCULAR; INTRAVENOUS; SUBCUTANEOUS
Status: DISCONTINUED | OUTPATIENT
Start: 2020-04-23 | End: 2020-04-29 | Stop reason: HOSPADM

## 2020-04-23 RX ORDER — CHLORHEXIDINE GLUCONATE ORAL RINSE 1.2 MG/ML
15 SOLUTION DENTAL EVERY 8 HOURS
Status: DISCONTINUED | OUTPATIENT
Start: 2020-04-24 | End: 2020-04-27

## 2020-04-23 RX ORDER — LIDOCAINE HYDROCHLORIDE 20 MG/ML
INJECTION, SOLUTION INFILTRATION; PERINEURAL PRN
Status: DISCONTINUED | OUTPATIENT
Start: 2020-04-23 | End: 2020-04-23

## 2020-04-23 RX ORDER — ONDANSETRON 2 MG/ML
4 INJECTION INTRAMUSCULAR; INTRAVENOUS EVERY 6 HOURS
Status: DISCONTINUED | OUTPATIENT
Start: 2020-04-23 | End: 2020-04-23

## 2020-04-23 RX ORDER — HYDROMORPHONE HYDROCHLORIDE 1 MG/ML
.3-.5 INJECTION, SOLUTION INTRAMUSCULAR; INTRAVENOUS; SUBCUTANEOUS EVERY 10 MIN PRN
Status: DISCONTINUED | OUTPATIENT
Start: 2020-04-23 | End: 2020-04-23

## 2020-04-23 RX ORDER — PROPOFOL 10 MG/ML
INJECTION, EMULSION INTRAVENOUS CONTINUOUS PRN
Status: DISCONTINUED | OUTPATIENT
Start: 2020-04-23 | End: 2020-04-23

## 2020-04-23 RX ORDER — LABETALOL 20 MG/4 ML (5 MG/ML) INTRAVENOUS SYRINGE
5 EVERY 10 MIN PRN
Status: DISCONTINUED | OUTPATIENT
Start: 2020-04-23 | End: 2020-04-23

## 2020-04-23 RX ORDER — POTASSIUM CL/LIDO/0.9 % NACL 10MEQ/0.1L
10 INTRAVENOUS SOLUTION, PIGGYBACK (ML) INTRAVENOUS
Status: DISCONTINUED | OUTPATIENT
Start: 2020-04-23 | End: 2020-04-29 | Stop reason: HOSPADM

## 2020-04-23 RX ORDER — AMPICILLIN AND SULBACTAM 2; 1 G/1; G/1
3 INJECTION, POWDER, FOR SOLUTION INTRAMUSCULAR; INTRAVENOUS
Status: COMPLETED | OUTPATIENT
Start: 2020-04-23 | End: 2020-04-23

## 2020-04-23 RX ORDER — NICOTINE POLACRILEX 4 MG
15-30 LOZENGE BUCCAL
Status: DISCONTINUED | OUTPATIENT
Start: 2020-04-23 | End: 2020-04-29 | Stop reason: HOSPADM

## 2020-04-23 RX ORDER — ONDANSETRON 4 MG/1
4 TABLET, ORALLY DISINTEGRATING ORAL EVERY 30 MIN PRN
Status: DISCONTINUED | OUTPATIENT
Start: 2020-04-23 | End: 2020-04-23

## 2020-04-23 RX ORDER — EPINEPHRINE NASAL SOLUTION 1 MG/ML
SOLUTION NASAL PRN
Status: DISCONTINUED | OUTPATIENT
Start: 2020-04-23 | End: 2020-04-23 | Stop reason: HOSPADM

## 2020-04-23 RX ORDER — CALCIUM CHLORIDE 100 MG/ML
INJECTION INTRAVENOUS; INTRAVENTRICULAR PRN
Status: DISCONTINUED | OUTPATIENT
Start: 2020-04-23 | End: 2020-04-23

## 2020-04-23 RX ORDER — PROPOFOL 10 MG/ML
10-20 INJECTION, EMULSION INTRAVENOUS EVERY 30 MIN PRN
Status: DISCONTINUED | OUTPATIENT
Start: 2020-04-23 | End: 2020-04-24

## 2020-04-23 RX ORDER — HYDRALAZINE HYDROCHLORIDE 20 MG/ML
2.5-5 INJECTION INTRAMUSCULAR; INTRAVENOUS EVERY 10 MIN PRN
Status: DISCONTINUED | OUTPATIENT
Start: 2020-04-23 | End: 2020-04-23

## 2020-04-23 RX ORDER — LIDOCAINE 40 MG/G
CREAM TOPICAL
Status: DISCONTINUED | OUTPATIENT
Start: 2020-04-23 | End: 2020-04-29 | Stop reason: HOSPADM

## 2020-04-23 RX ORDER — MAGNESIUM SULFATE HEPTAHYDRATE 40 MG/ML
4 INJECTION, SOLUTION INTRAVENOUS EVERY 4 HOURS PRN
Status: DISCONTINUED | OUTPATIENT
Start: 2020-04-23 | End: 2020-04-29 | Stop reason: HOSPADM

## 2020-04-23 RX ORDER — MINERAL OIL/HYDROPHIL PETROLAT
OINTMENT (GRAM) TOPICAL EVERY 8 HOURS
Status: DISCONTINUED | OUTPATIENT
Start: 2020-04-24 | End: 2020-04-29 | Stop reason: HOSPADM

## 2020-04-23 RX ORDER — POTASSIUM CHLORIDE 29.8 MG/ML
20 INJECTION INTRAVENOUS
Status: DISCONTINUED | OUTPATIENT
Start: 2020-04-23 | End: 2020-04-29 | Stop reason: HOSPADM

## 2020-04-23 RX ORDER — FENTANYL CITRATE 50 UG/ML
25-50 INJECTION, SOLUTION INTRAMUSCULAR; INTRAVENOUS
Status: DISCONTINUED | OUTPATIENT
Start: 2020-04-23 | End: 2020-04-23 | Stop reason: HOSPADM

## 2020-04-23 RX ORDER — FENTANYL CITRATE 50 UG/ML
25-50 INJECTION, SOLUTION INTRAMUSCULAR; INTRAVENOUS EVERY 5 MIN PRN
Status: DISCONTINUED | OUTPATIENT
Start: 2020-04-23 | End: 2020-04-23

## 2020-04-23 RX ORDER — PROPOFOL 10 MG/ML
5-75 INJECTION, EMULSION INTRAVENOUS CONTINUOUS
Status: DISCONTINUED | OUTPATIENT
Start: 2020-04-23 | End: 2020-04-24

## 2020-04-23 RX ORDER — AMPICILLIN AND SULBACTAM 1; .5 G/1; G/1
1.5 INJECTION, POWDER, FOR SOLUTION INTRAMUSCULAR; INTRAVENOUS SEE ADMIN INSTRUCTIONS
Status: DISCONTINUED | OUTPATIENT
Start: 2020-04-23 | End: 2020-04-23

## 2020-04-23 RX ORDER — AMPICILLIN AND SULBACTAM 2; 1 G/1; G/1
3 INJECTION, POWDER, FOR SOLUTION INTRAMUSCULAR; INTRAVENOUS EVERY 6 HOURS
Status: COMPLETED | OUTPATIENT
Start: 2020-04-24 | End: 2020-04-26

## 2020-04-23 RX ORDER — DEXAMETHASONE SODIUM PHOSPHATE 4 MG/ML
10 INJECTION, SOLUTION INTRA-ARTICULAR; INTRALESIONAL; INTRAMUSCULAR; INTRAVENOUS; SOFT TISSUE ONCE
Status: COMPLETED | OUTPATIENT
Start: 2020-04-23 | End: 2020-04-23

## 2020-04-23 RX ORDER — ONDANSETRON 2 MG/ML
4 INJECTION INTRAMUSCULAR; INTRAVENOUS EVERY 30 MIN PRN
Status: DISCONTINUED | OUTPATIENT
Start: 2020-04-23 | End: 2020-04-23

## 2020-04-23 RX ORDER — DEXTROSE MONOHYDRATE 25 G/50ML
25-50 INJECTION, SOLUTION INTRAVENOUS
Status: DISCONTINUED | OUTPATIENT
Start: 2020-04-23 | End: 2020-04-29 | Stop reason: HOSPADM

## 2020-04-23 RX ORDER — POTASSIUM CHLORIDE 7.45 MG/ML
10 INJECTION INTRAVENOUS
Status: DISCONTINUED | OUTPATIENT
Start: 2020-04-23 | End: 2020-04-29 | Stop reason: HOSPADM

## 2020-04-23 RX ORDER — ONDANSETRON 4 MG/1
4 TABLET, ORALLY DISINTEGRATING ORAL EVERY 6 HOURS PRN
Status: DISCONTINUED | OUTPATIENT
Start: 2020-04-23 | End: 2020-04-25

## 2020-04-23 RX ADMIN — DEXAMETHASONE SODIUM PHOSPHATE 10 MG: 4 INJECTION, SOLUTION INTRA-ARTICULAR; INTRALESIONAL; INTRAMUSCULAR; INTRAVENOUS; SOFT TISSUE at 16:05

## 2020-04-23 RX ADMIN — ROCURONIUM BROMIDE 50 MG: 10 INJECTION INTRAVENOUS at 07:52

## 2020-04-23 RX ADMIN — LIDOCAINE HYDROCHLORIDE 100 MG: 20 INJECTION, SOLUTION INFILTRATION; PERINEURAL at 07:47

## 2020-04-23 RX ADMIN — Medication 75 MCG/HR: at 21:06

## 2020-04-23 RX ADMIN — CALCIUM CHLORIDE 500 MG: 100 INJECTION, SOLUTION INTRAVENOUS at 12:51

## 2020-04-23 RX ADMIN — AMPICILLIN AND SULBACTAM 1.5 G: 1; .5 INJECTION, POWDER, FOR SOLUTION INTRAMUSCULAR; INTRAVENOUS at 12:15

## 2020-04-23 RX ADMIN — FENTANYL CITRATE 50 MCG: 50 INJECTION, SOLUTION INTRAMUSCULAR; INTRAVENOUS at 17:54

## 2020-04-23 RX ADMIN — CALCIUM CHLORIDE 500 MG: 100 INJECTION, SOLUTION INTRAVENOUS at 10:30

## 2020-04-23 RX ADMIN — GLYCOPYRROLATE 0.1 MG: 0.2 INJECTION, SOLUTION INTRAMUSCULAR; INTRAVENOUS at 14:42

## 2020-04-23 RX ADMIN — AMPICILLIN SODIUM AND SULBACTAM SODIUM 3 G: 2; 1 INJECTION, POWDER, FOR SOLUTION INTRAMUSCULAR; INTRAVENOUS at 23:55

## 2020-04-23 RX ADMIN — AMPICILLIN AND SULBACTAM 1.5 G: 1; .5 INJECTION, POWDER, FOR SOLUTION INTRAMUSCULAR; INTRAVENOUS at 10:15

## 2020-04-23 RX ADMIN — HYDROMORPHONE HYDROCHLORIDE 0.5 MG: 1 INJECTION, SOLUTION INTRAMUSCULAR; INTRAVENOUS; SUBCUTANEOUS at 17:55

## 2020-04-23 RX ADMIN — SODIUM CHLORIDE, POTASSIUM CHLORIDE, SODIUM LACTATE AND CALCIUM CHLORIDE: 600; 310; 30; 20 INJECTION, SOLUTION INTRAVENOUS at 21:02

## 2020-04-23 RX ADMIN — REMIFENTANIL HYDROCHLORIDE: 1 INJECTION, POWDER, LYOPHILIZED, FOR SOLUTION INTRAVENOUS at 12:51

## 2020-04-23 RX ADMIN — AMPICILLIN AND SULBACTAM 1.5 G: 1; .5 INJECTION, POWDER, FOR SOLUTION INTRAMUSCULAR; INTRAVENOUS at 18:10

## 2020-04-23 RX ADMIN — GLYCOPYRROLATE 0.1 MG: 0.2 INJECTION, SOLUTION INTRAMUSCULAR; INTRAVENOUS at 10:41

## 2020-04-23 RX ADMIN — PROPOFOL 50 MG: 10 INJECTION, EMULSION INTRAVENOUS at 17:54

## 2020-04-23 RX ADMIN — ACETAMINOPHEN 1000 MG: 500 TABLET, FILM COATED ORAL at 07:26

## 2020-04-23 RX ADMIN — REMIFENTANIL HYDROCHLORIDE 0.08 MCG/KG/MIN: 1 INJECTION, POWDER, LYOPHILIZED, FOR SOLUTION INTRAVENOUS at 08:15

## 2020-04-23 RX ADMIN — ONDANSETRON 4 MG: 2 INJECTION INTRAMUSCULAR; INTRAVENOUS at 19:40

## 2020-04-23 RX ADMIN — ENOXAPARIN SODIUM 40 MG: 40 INJECTION SUBCUTANEOUS at 15:30

## 2020-04-23 RX ADMIN — BACITRACIN ZINC: 500 OINTMENT TOPICAL at 19:40

## 2020-04-23 RX ADMIN — PROPOFOL 90 MG: 10 INJECTION, EMULSION INTRAVENOUS at 07:51

## 2020-04-23 RX ADMIN — HUMAN INSULIN 4 UNITS/HR: 100 INJECTION, SOLUTION SUBCUTANEOUS at 22:39

## 2020-04-23 RX ADMIN — CALCIUM CHLORIDE 200 MG: 100 INJECTION, SOLUTION INTRAVENOUS at 17:08

## 2020-04-23 RX ADMIN — SODIUM CHLORIDE, SODIUM GLUCONATE, SODIUM ACETATE, POTASSIUM CHLORIDE AND MAGNESIUM CHLORIDE: 526; 502; 368; 37; 30 INJECTION, SOLUTION INTRAVENOUS at 07:40

## 2020-04-23 RX ADMIN — DEXAMETHASONE SODIUM PHOSPHATE 2 MG: 4 INJECTION, SOLUTION INTRA-ARTICULAR; INTRALESIONAL; INTRAMUSCULAR; INTRAVENOUS; SOFT TISSUE at 08:10

## 2020-04-23 RX ADMIN — FENTANYL CITRATE 250 MCG: 50 INJECTION, SOLUTION INTRAMUSCULAR; INTRAVENOUS at 07:47

## 2020-04-23 RX ADMIN — Medication 15 MG: at 08:03

## 2020-04-23 RX ADMIN — REMIFENTANIL HYDROCHLORIDE: 1 INJECTION, POWDER, LYOPHILIZED, FOR SOLUTION INTRAVENOUS at 17:45

## 2020-04-23 RX ADMIN — PROPOFOL 60 MCG/KG/MIN: 10 INJECTION, EMULSION INTRAVENOUS at 21:49

## 2020-04-23 RX ADMIN — DEXAMETHASONE SODIUM PHOSPHATE 8 MG: 4 INJECTION, SOLUTION INTRA-ARTICULAR; INTRALESIONAL; INTRAMUSCULAR; INTRAVENOUS; SOFT TISSUE at 07:57

## 2020-04-23 RX ADMIN — AMPICILLIN AND SULBACTAM 1.5 G: 1; .5 INJECTION, POWDER, FOR SOLUTION INTRAMUSCULAR; INTRAVENOUS at 16:15

## 2020-04-23 RX ADMIN — SODIUM CHLORIDE, SODIUM GLUCONATE, SODIUM ACETATE, POTASSIUM CHLORIDE AND MAGNESIUM CHLORIDE: 526; 502; 368; 37; 30 INJECTION, SOLUTION INTRAVENOUS at 09:57

## 2020-04-23 RX ADMIN — AMPICILLIN AND SULBACTAM 1.5 G: 1; .5 INJECTION, POWDER, FOR SOLUTION INTRAMUSCULAR; INTRAVENOUS at 14:15

## 2020-04-23 RX ADMIN — Medication 10 MG: at 08:35

## 2020-04-23 RX ADMIN — SODIUM CHLORIDE, POTASSIUM CHLORIDE, SODIUM LACTATE AND CALCIUM CHLORIDE: 600; 310; 30; 20 INJECTION, SOLUTION INTRAVENOUS at 08:15

## 2020-04-23 RX ADMIN — AMPICILLIN SODIUM AND SULBACTAM SODIUM 3 G: 2; 1 INJECTION, POWDER, FOR SOLUTION INTRAMUSCULAR; INTRAVENOUS at 08:15

## 2020-04-23 RX ADMIN — Medication 10 MG: at 08:46

## 2020-04-23 RX ADMIN — MIDAZOLAM 2 MG: 1 INJECTION INTRAMUSCULAR; INTRAVENOUS at 07:35

## 2020-04-23 RX ADMIN — CALCIUM CHLORIDE 500 MG: 100 INJECTION, SOLUTION INTRAVENOUS at 11:26

## 2020-04-23 RX ADMIN — PROPOFOL 25 MCG/KG/MIN: 10 INJECTION, EMULSION INTRAVENOUS at 18:10

## 2020-04-23 RX ADMIN — CALCIUM CHLORIDE 300 MG: 100 INJECTION, SOLUTION INTRAVENOUS at 17:12

## 2020-04-23 RX ADMIN — PROPOFOL 65 MCG/KG/MIN: 10 INJECTION, EMULSION INTRAVENOUS at 20:32

## 2020-04-23 RX ADMIN — GABAPENTIN 300 MG: 300 CAPSULE ORAL at 07:26

## 2020-04-23 RX ADMIN — SODIUM CHLORIDE, SODIUM GLUCONATE, SODIUM ACETATE, POTASSIUM CHLORIDE AND MAGNESIUM CHLORIDE: 526; 502; 368; 37; 30 INJECTION, SOLUTION INTRAVENOUS at 16:53

## 2020-04-23 RX ADMIN — FENTANYL CITRATE 50 MCG: 50 INJECTION INTRAMUSCULAR; INTRAVENOUS at 18:59

## 2020-04-23 RX ADMIN — CALCIUM CHLORIDE 500 MG: 100 INJECTION, SOLUTION INTRAVENOUS at 16:22

## 2020-04-23 RX ADMIN — CALCIUM CHLORIDE 500 MG: 100 INJECTION, SOLUTION INTRAVENOUS at 14:40

## 2020-04-23 RX ADMIN — HUMAN INSULIN 2 UNITS/HR: 100 INJECTION, SOLUTION SUBCUTANEOUS at 14:29

## 2020-04-23 ASSESSMENT — ACTIVITIES OF DAILY LIVING (ADL)
TRANSFERRING: 0-->INDEPENDENT
PRIOR_FUNCTIONAL_LEVEL_COMMENT: INDEPENDENT
FALL_HISTORY_WITHIN_LAST_SIX_MONTHS: NO
TOILETING: 0-->INDEPENDENT
AMBULATION: 0-->INDEPENDENT
BATHING: 0-->INDEPENDENT
ADLS_ACUITY_SCORE: 13
RETIRED_COMMUNICATION: 0-->UNDERSTANDS/COMMUNICATES WITHOUT DIFFICULTY
RETIRED_EATING: 0-->INDEPENDENT
DRESS: 0-->INDEPENDENT
SWALLOWING: 0-->SWALLOWS FOODS/LIQUIDS WITHOUT DIFFICULTY
COGNITION: 0 - NO COGNITION ISSUES REPORTED

## 2020-04-23 ASSESSMENT — MIFFLIN-ST. JEOR: SCORE: 1745

## 2020-04-23 NOTE — ANESTHESIA PROCEDURE NOTES
Arterial Line Procedure Note  Staff -   Anesthesiologist:  Isaias Martinez MD      Performed By: anesthesiologist          Location: In OR After Induction  Procedure Start/Stop Times:     patient identified, IV checked, risks and benefits discussed, informed consent, monitors and equipment checked, pre-op evaluation and at physician/surgeon's request      Correct Patient: Yes      Correct Position: Yes      Correct Site: Yes      Correct Procedure: Yes      Correct Laterality:  Yes    Site Marked:  N/A  Line Placement:     Procedure:  Arterial Line    Insertion Site:  Radial    Insertion laterality:  Left    Skin Prep: Chloraprep      Patient Prep: mask, hat and hand hygiene      Catheter size:  20 gauge, Quick cath    Cath secured with comment:  Benzoin    Dressing:  Tegaderm    Complications:  None obvious    Arterial waveform: Yes

## 2020-04-23 NOTE — PROGRESS NOTES
Called patient with negative COVID 19 testing results.     He will proceed as scheduled for surgery tomorrow. He was encouraged to call with further questions or concerns.     Chani Elise RN, BSN

## 2020-04-23 NOTE — OP NOTE
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 Secondary malignancy of lymph nodes    Postoperative Diagnosis: same    Anesthesia: General    Blood loss: 200 cc    Specimens:   ID Type Source Tests Collected by Time Destination   A : Wide Local Excision Buccal Mucosa Tissue Other SURGICAL PATHOLOGY EXAM Kameron Dior MD 4/23/2020  9:49 AM    B : Periosteal Deep Margin Tissue Other SURGICAL PATHOLOGY EXAM Kameron Dior MD 4/23/2020  9:51 AM    C : Anterior Margin Tissue Other SURGICAL PATHOLOGY EXAM Kameron Dior MD 4/23/2020 10:00 AM    D : Anterolateral Margin Tissue Other SURGICAL PATHOLOGY EXAM Kameron Dior MD 4/23/2020 10:10 AM    E : Posteriolateral Margin Tissue Other SURGICAL PATHOLOGY EXAM Kameron Dior MD 4/23/2020 10:10 AM    F : Posterior Margin Tissue Other SURGICAL PATHOLOGY EXAM Kameron Dior MD 4/23/2020 10:10 AM    G : Posterior Medial Margin Tissue Other SURGICAL PATHOLOGY EXAM Kameron Dior MD 4/23/2020 10:11 AM    H : Anterior Medial Margin Tissue Other SURGICAL PATHOLOGY EXAM Kameron Dior MD 4/23/2020 10:11 AM    I : Left Level 1B Tissue Lymph Node SURGICAL PATHOLOGY EXAM Kameron Dior MD 4/23/2020 10:55 AM    J : Left Level 2A Tissue Lymph Node SURGICAL PATHOLOGY EXAM Kameron Dior MD 4/23/2020 11:52 AM    K : Left Level 3 Tissue Lymph Node SURGICAL PATHOLOGY EXAM Kameron Dior MD 4/23/2020 11:52 AM    L : Left Level 4 Tissue Lymph Node SURGICAL PATHOLOGY EXAM Kameron Dior MD 4/23/2020 11:52 AM    M : Left Level 2B Tissue Lymph Node SURGICAL PATHOLOGY EXAM Kameron Dior MD 4/23/2020 12:00 PM    N : New Deep Margin,  Marginal Mandibulectomy Tissue Lymph Node SURGICAL PATHOLOGY EXAM Kameron Dior MD 4/23/2020 12:29 PM    O : Addition Level 1B Tissue Other SURGICAL PATHOLOGY EXAM Kameron Dior MD 4/23/2020  3:52 PM        Implants:  JOSUE drain x 2 in neck  JOSUE drain x 1 in arm  Wound vac  Cook implantable doppler    Complications: None    Findings:  Defect of left buccal mucosa measuring 9 x 5 cm - reconstructed with right radial forearm free flap  Tourniquet start: 1232, tourniquet end: 1346  Ischemia start: 1412, ischemia end: 1706  Radial artery anastomosed to left facial artery  Cephalic vein anastomosed to left external jugular vein with a 3.5 mm   Communicating vein anastomosed to left facial vein with 3.0 mm     Indications:  Norris Reyes is a 59 year old man with a 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 He had a PET scan which showed a 1.7 x 1.1 x 3.2 cm hypermetabolic lesion of the left buccal mucosa with left lymphadenopathy in levels IB-III. He is indicated for surgical resection with free flap reconstruction.    Description of Procedure:  After informed consent was obtained, the patient was brought back to the main operating room and placed in a supine position. General anesthesia was induced and the patient was orotracheally intubated. An arterial line and banegas were placed. The bed was turned 180 degrees from anesthesia. The dental team performed dental extractions. The patient was then prepped and draped in sterile fashion. A time out was performed. Given the COVID pandemic and the lack of available assistance in the OR, I participated in the left oral cavity resection and left neck dissection as an assistant surgeon to Dr Dior.     After Dr Dior had completed the ablative portion of the procedure, the defect was examined and determined to be suitable  for a radial forearm free tissue transfer. The defect consisted of a 9 x 5 cm defect of the left buccal mucosa extending to the maxillary tuberosity, with exposed mandible extending to tooth #21. The patient had been previously prepped and draped for a right radial forearm free flap. The planned flap was marked with a marking pen, with a skin paddle measuring 9 x 5 cm in greatest dimensions designed over the volar surface of the forearm. A superior limb extending up toward the antecubital fossa was also marked. The arm was exsanguinated and the tourniquet was inflated to 250 mm Hg. A 15 blade was used to make an incision extending from the antecubital fossa down to the superior edge of the flap and circumferentially around the planned skin paddle. Subdermal flaps were raised laterally. The cephalic vein was identified and preserved. This was traced proximally and distally. A branch of the cephalic was able to be identified extending into the flap skin flap and the cephalic system was kept in continuity with the flap. The flexor carpi radialis and brachioradialis were identified. The radial nerve was identified distally just lateral to the skin paddle flap.The subcutaneous fat was divided down to the brachioradialis muscle proximally. The 15 blade was used to release the medial border of the brachioradialis. During this dissection, the radial nerve was traced and preserved. The 15 blade was then used to release the medial border of the flexor carpi radialis working from superior to inferior. The skin paddle was raised in a suprafascial plane working from the ulnar side medially to the edge of the flexor carpi radialis tendon. The skin paddle was then raised from the radial side working medially in a suprafacial plane. This was then transitioned to a subfascial plane at the flexor carpi radialis and brachioradialis. The venae comitantes and radial artery were ligated distally. The flap was then raised deep to the  pedicle using sharp dissection, taking care to clip any perforating vessels. This dissection was carried superiorly up to the antecubital fossa.The venae comitantes were traced superiorly to find their area of convergence into one system. The cephalic vein was also dissected out. The tourniquet was deflated. The flap was allowed to reperfuse for approximately 30 minutes. The proximal radial artery was then ligated along with the veins.      Attention was turned to closure of the right radial forearm donor site. A JOSUE drain was placed and secured with a nylon suture. The proximal incision was closed in 2 layers with a deep 3-0 vicryl followed by a running 4-0 nylon. A split thickness skin graft was harvested from the right thigh with a dermatome and sutured to the forearm donor site with 4-0 chromic in running fashion. The skin graft was pie crusted and tacked in place. A dressing was placed along with a wound vac. A volar splint was made with plaster sheets and wrapped in an ace bandage.     The radial forearm flap was brought to the head of the bed and placed within the defect. A tonsil was used to create a tunnel from the oral cavity into the neck along the outer cortex of the mandible, taking care around the marginal mandibular nerve. A 1 inch penrose was placed through this opening and this was dilated using the Bernabe dilators. The flap pedicle was then passed into the neck through the penrose. The distal end of the radial forearm flap was placed anteriorly near tooth #21. The proximal end of the skin paddle was placed posteriorly near the maxillary tuberosity. The edge of the free flap was sutured to the maxillary tuberosity mucosa with a 3-0 vicryl in horizontal mattress fashion. This was extended inferiorly along the RMT and then anteriorly along the left floor of mouth. Similarly the skin paddle was sutured to the native buccal mucosa on the left working from posterior to anterior. Circumdental sutures were  used around tooth #21. The fistula into the neck was eliminated with the skin paddle.     Attention was then turned to the anastamosis of the vessels. The vessels had been placed into the left neck, taking care to ensure that there was a gentle curvature to the vessels. The facial artery was dissected and mobilized. The facial artery was deemed appropriate for use given the vessel diameter and geometry.  There was a common facial vein and the external jugular vein. These were dissected free to improve access for anastomosis. The microscope was brought into place. The facial artery, facial vein, and external jugular vein were cleaned circumferentially. The distal most aspect of the facial artery had been ligated and a vascular clamp was placed across the base after testing for adequate flow. The external jugular vein was ligated distally and a vascular clamp was placed across its base. Similarly, the flow was tested across the common facial vein and a clamp was placed across its base. The flap vessels were similarly prepared. The vessel dilator was used to dilate the vessels which were then irrigated.      The facial artery and radial artery were brought into apposition using a double 3A vascular clamp and circumferential interrupted 9-0 nylon was used to approximate the arteries. The venous coupling sizer had been used to measure the size of the veins. A 3.0 mm  was then used to anastomose the common facial vein to the vena comitantes as this provided the best geometry. The coupling device was brought into place. The veins were placed over the prongs on the top of the . The coupling device was engaged and removed without difficulty. The procedure was repeated for the cephalic system. The venous coupling sizer was used to measure the size of the veins. A 3.5 mm  was then used to anastomose the external jugular vein to the cephalic vein. The veins were placed over the prongs on the top of the  . The coupling device was engaged and removed without difficulty. Papaverine was applied to the artery. The vascular clamp was released off the artery with good flow noted. Once flow was obtained across both venous anastomoses, the venous clamps were removed.  The skin edges of the flap were inspected with bleeding observed. Gelfoam was placed along the cephalic system venous anastomosis to keep it from kinking. An implantable [employer] doppler was placed around the artery and secured with micro-clips with a strong arterial signal.      Hemostasis was ensured in the neck. Two 10 mm fully perforated JOSUE drains were placed in the neck and secured with a 3-0 nylon (medial drain along the midline neck, lateral drain in the gutter). The neck was closed with a buried interrupted 3-0 vicryl followed by a running 5-0 prolene suture. The handheld doppler was used to verify an arterial signal along the skin paddle. An NG tube was placed and secured with a 2-0 silk suture. The patient was handed back to Anesthesia and taken to the SICU in stable condition.     I was present for and participated in the entire procedure.         Opal Castañeda MD    Department of Otolaryngology

## 2020-04-23 NOTE — H&P
SURGICAL ICU ADMISSION NOTE  4/23/2020    PRIMARY TEAM: ENT  PRIMARY PHYSICIAN: Dr. Guerrero    REASON FOR CRITICAL CARE ADMISSION: Respiratory status and flap monitoring    ADMITTING PHYSICIAN: Dr. Andino    ASSESSMENT:  Norris Reyes is a 59 year old male w/ h/o HTN w/ recent diagnosis of left buccal mucosa [id number] SCC s/p wide local excision of left buccal mucosa squamous cell carcinoma, left modified radical neck dissection, nasogastric tube placement, right forearm free flap, split thickness skin graft from right thigh and Dental exam, extraction of tooth #13, 14 and 15.     PLAN:   Neuro/ pain/ sedation:  -Monitor neurological status  -fentanyl gtt for pain  -propofol gtt for sedation      ENT:  #left buccal mucosa [id number] SCC  #left modified radical neck dissection  #right forearm free flap,  #split thickness skin graft from right thigh   #extraction of tooth #13, 14 and 15.  -q1h flap checks by RN and ENT residents  -aquaphor to incision   -bacitracin over incision   -nothing over mouth or left side of neck    Pulmonary care:   -Intubated CMV//15/5/50%  -no plans on extubation tonight  -s/p decadron to assist with extubation tomorrow  -ETT taped to right side of mouth, advance 3 cm based on post-op CXR     Cardiovascular:    #h/o HTN    -Hold PTA losartan and fenofibrate   -MAP >60  -ok with up to 1L albumin before notifying ENT if more volume is needed     GI/Nutrition:   #SCC Left buccal mucosa   -NPO   -NG to gravity     Renal/Fluids/ Electrolytes:   -ICU electrolyte replacement protocol  -Cr 1.20 (prior Cr 1.22)     Endocrine:    #No h/o DMII  #s/p decadron   - insulin gtt      ID/ Antibiotics:  -Unasyn for 48 hours for perioperative pharmacoprophylaxis  -Pre op COVID negative      Heme:     -Admission Hgb 14.1  -EBL 200mL  -Post OR Hb stable at 13.4      Prophylaxis:    -Mechanical prophylaxis for DVT   -No chemical DVT prophylaxis due to high risk of bleeding.      MSK:    -PT and OT consulted.  Appreciate recs.     Lines/ tubes/ drains:  -2 PIVs on left arm, 1 A-line on Left arm, 2 JOSUE drains at neck, 1 JOSUE drain at right arm, 1 wound vac on right arm.      Disposition:  -Surgical ICU.     Patient discussed with Surgical ICU staff.    Monica Rao MD  PGY-2 General Surgery       - - - - - - - - - - - - - - - - - - - - - - - - - - - - - - - - - - - - - - - - - - - - - - - - - - - - - - - - - - - - - - - - - - - - - - - -     HPI:  Norris Reyes is a 59 year old male w/ h/o HTN had presented 3/2020 due to 3 weeks of left sided oral soreness. He had a previous lesion at that location approximately 10 years ago, thought to be an autoimmune process at that time, possible lichen planus or leukoplakia. No previous biopsies. He underwent a biopsy on 3/18/2020 that demonstrated SCC. Patient also noticed a new lump along his left jaw. A CT and PET scan demonstrated primary lesion as well as disease in level IB. This was preseneted at tumor board. Patient elected to undergo a wide local excision of the 3 x 2.5 cm buccal mucosa and left neck dissection with feeding tube placement and reconstruction.     REVIEW OF SYSTEMS:   Unable to obtain     PAST MEDICAL HISTORY:    has a past medical history of Autoimmune disease (H) (2/11/2020), Hypertension (2/11/2020), Nephrolithiasis, and 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).    SURGICAL HISTORY:   None  Teeth extraction     SOCIAL HISTORY:    reports that he has never smoked. He has never used smokeless tobacco. He reports current alcohol use. He reports that he does not use drugs.    FAMILY HISTORY:  No bleeding/clotting disorders.    ALLERGIES:    No Known Allergies    MEDICATIONS:  No current facility-administered medications on file prior to encounter.   LOSARTAN POTASSIUM PO, Take 50 mg by mouth every morning       PHYSICAL EXAMINATION:  Temp:  [98.2  F (36.8  C)-99  F (37.2  C)] 98.2  F (36.8  C)  Pulse:  [77-94] 90  Heart Rate:  [79-96] 79  Resp:  [15-16]  15  BP: (131-154)/(71-83) 131/73  MAP:  [65 mmHg-83 mmHg] 65 mmHg  Arterial Line BP: (104-142)/(46-61) 109/46  FiO2 (%):  [50 %] 50 %  SpO2:  [97 %-100 %] 99 %    General/Neuro: Intubated and sedated  HEENT: incision c/d/i, 2 JOSUE drains at neck with sanguinous output, flap over left cheeck  Pulm: Mechanically ventilated  Abd: soft; NT, ND  Extremities: no edema, right arm in ACE wrap, JOSUE drain with sanguinous output from right arm, wound vac in place with good seal  Skin: warm and well-perfused.     LABS: Reviewed.   Arterial Blood Gases   Recent Labs   Lab 04/23/20 2049 04/23/20 1410 04/23/20  1004   PH 7.43 7.42 7.46*   PCO2 39 37 36   PO2 102 96 72*   HCO3 25 24 25     Complete Blood Count   Recent Labs   Lab 04/23/20 2049 04/23/20 1410 04/23/20  1004   WBC 11.3*  --   --    HGB 13.4 13.7 14.1     --   --      Basic Metabolic Panel  Recent Labs   Lab 04/23/20 2049 04/23/20  1410 04/23/20  1004    136 137   POTASSIUM 4.3 4.4 4.6   CHLORIDE 107  --   --    CO2 26  --   --    BUN 17  --   --    CR 1.20  --   --    * 150* 135*     IMAGING:  Recent Results (from the past 24 hour(s))   XR Abdomen Port 1 View    Narrative    Exam: XR ABDOMEN PORT 1 VW, 4/23/2020 7:56 PM    Indication: please evaluate for NG tube placement    Comparison: 4/8/2020 PET/CT    Findings:   Supine frontal view of the abdomen. Enteric tube tip and sidehole  project over the stomach. Air-filled, nondilated loops of small and  large bowel. No pneumatosis. Hyperattenuating focus projecting over  the left 12th rib corresponds to a metallic foreign body in the skin  seen on comparison PET CT. Multilevel degenerative change in the  lumbar spine.      Impression    Impression: Enteric tube tip and sidehole in the stomach.    SUSAN CASLTE, DO   XR Chest Port 1 View    Narrative    Exam: XR CHEST PORT 1 VW, 4/23/2020 9:08 PM    Indication: ET tube placement check    Comparison: 4/8/2020 PET/CT    Findings:   Endotracheal tube  tip at the upper thoracic trachea. Surgical drain in  the left neck. Enteric tube courses below the diaphragm and off image  margin. The cardiomediastinal silhouette and pulmonary vasculature are  within normal limits. Streaky perihilar and bibasilar opacities. No  appreciable pleural effusion or pneumothorax.      Impression    Impression:   1. Endotracheal tube tip at the upper thoracic trachea.  2. Left neck surgical drain.  3. Streaky perihilar and medial bibasilar opacities, likely  atelectasis.    SUSAN CASTLE, DO

## 2020-04-23 NOTE — PROGRESS NOTES
I met the patient at bedside today prior to surgery. He was not seen in clinic prior to surgery due to the COVID pandemic and risks from visits. I spoke with him about the plan for the free flap reconstruction which was previously discussed with the patient by Dr Dior. He is left handed so plan for a right radial forearm free flap. An Tiburcio's test was performed in clinic by Dr Dior preoperatively. We discussed the postoperative monitoring of the flap. He had questions about the role of a tracheostomy and we discussed that the plan would be determined intraoperatively. Patient amenable to proceeding and consent signed.    Opal Castañeda MD    Department of Otolaryngology

## 2020-04-23 NOTE — BRIEF OP NOTE
[facility], [place]    Brief Operative Note    Pre-operative diagnosis: 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) [C06.0]  Post-operative diagnosis Same as pre-operative diagnosis, dental caries, chronic apical abscess    Procedure: Procedure(s):  wide local excision of left buccal mucosa squamous cell carcinoma  left modified radical neck dissection  possible Tracheostomy placement, nasogastric tube placement  right forearm free flap  split thickness skin graft from right thigh  Dental exam, extraction of tooth #13, 14 and 15.   Surgeon: Surgeon(s) and Role:  Panel 1:     * Kameron Dior MD - Primary  Panel 2:     * Opal Castañeda MD - Primary  Panel 3:     * Diana Diehl DDS - Primary  Anesthesia: General   Estimated blood loss: 1 mL  Drains: None  Specimens: * No specimens in log *  Findings:   None.  Complications: None.  Implants: * No implants in log *

## 2020-04-23 NOTE — ANESTHESIA PREPROCEDURE EVALUATION
Anesthesia Pre-Procedure Evaluation    Patient: Norris Reyes   MRN:     [id number] Gender:   male   Age:    59 year old :      1960        Preoperative Diagnosis: 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) [C06.0]   Procedure(s):  wide local excision of left buccal mucosa squamous cell carcinoma  left modified radical neck dissection  possible Tracheostomy placement, nasogastric tube placement  right forearm free flap  split thickness skin graft from right thigh  Dental exam and SURGICAL EXTRACTION, Teeth     LABS:  CBC:   Lab Results   Component Value Date    WBC 5.5 04/15/2020    HGB 15.8 04/15/2020    HCT 48.2 04/15/2020     04/15/2020     BMP:   Lab Results   Component Value Date     04/15/2020    POTASSIUM 4.0 04/15/2020    CHLORIDE 105 04/15/2020    CO2 29 04/15/2020    BUN 21 04/15/2020    CR 1.22 04/15/2020     (H) 04/15/2020     COAGS: No results found for: PTT, INR, FIBR  POC:   Lab Results   Component Value Date     (H) 2020     OTHER:   Lab Results   Component Value Date    OLGA LIDIA 9.4 04/15/2020        Preop Vitals    BP Readings from Last 3 Encounters:   20 (!) 154/83   04/15/20 (!) 150/87   20 (!) 171/88    Pulse Readings from Last 3 Encounters:   20 77   04/15/20 89   20 82      Resp Readings from Last 3 Encounters:   20 16   04/15/20 19   20 18    SpO2 Readings from Last 3 Encounters:   20 98%   04/15/20 98%      Temp Readings from Last 1 Encounters:   20 37.2  C (99  F) (Oral)    Ht Readings from Last 1 Encounters:   20 1.829 m (6')      Wt Readings from Last 1 Encounters:   20 88.7 kg (195 lb 8.8 oz)    Estimated body mass index is 26.52 kg/m  as calculated from the following:    Height as of this encounter: 1.829 m (6').    Weight as of this encounter: 88.7 kg (195 lb 8.8 oz).     LDA:  ETT (Active)   Number of days: 0        Past Medical History:   Diagnosis Date     Autoimmune disease (H)  2/11/2020     Hypertension 2/11/2020    Losartan     Nephrolithiasis      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)       History reviewed. No pertinent surgical history.   No Known Allergies     Anesthesia Evaluation     .             ROS/MED HX    ENT/Pulmonary: Comment: L buccal SCC w/ mets to local lymph nodes      Neurologic:  - neg neurologic ROS     Cardiovascular:     (+) Dyslipidemia, hypertension----. : . . . :. .       METS/Exercise Tolerance:  >4 METS   Hematologic:  - neg hematologic  ROS       Musculoskeletal:  - neg musculoskeletal ROS       GI/Hepatic:  - neg GI/hepatic ROS       Renal/Genitourinary:  - ROS Renal section negative       Endo:  - neg endo ROS       Psychiatric:  - neg psychiatric ROS       Infectious Disease:  - neg infectious disease ROS       Malignancy:   (+) Malignancy           Other:                         PHYSICAL EXAM:   Mental Status/Neuro: A/A/O   Airway: Facies: Feasible   Respiratory:   Resp. Effort: Normal      CV:   Rate: Age appropriate   Comments:                      Assessment:   ASA SCORE: 2    H&P: History and physical reviewed and following examination; no interval change.    NPO Status: NPO Appropriate     Plan:   Anes. Type:  General   Pre-Medication: None   Induction:  IV (Standard)   Airway: ETT; Oral   Access/Monitoring: PIV; 2nd PIV; A-Line   Maintenance: Balanced     Advanced Monitoring: BIS     Postop Plan:   Postop Pain: Opioids  Postop Sedation/Airway: Not planned  Disposition: Inpatient/Admit     PONV Management:   Adult Risk Factors:, Postop Opioids   Prevention: Ondansetron, Dexamethasone     CONSENT: Direct conversation   Plan and risks discussed with: Patient                      Isaias Dean MD

## 2020-04-23 NOTE — OP NOTE
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  Risks, complications included but not limited to postoperative pain, swelling, bleeding, infection, temporary or permanent paresthesia, anesthesia of the mandibular nerve, lingual nerve, failure to resolve chief complaint, or need for additional procedures.  The patient agrees to procedure as written and signed consent in the chart.      PROCEDURE:   Under general anesthesia, the following operations were performed in the mouth:   Bilateral dental examination, extractions 3      ATTENDING PHYSICIAN:  Diana Diehl DDS      FIRST ASSISTANT:  DENTAL RESIDENT:  Jose Dove DMD      ANESTHESIOLOGIST:  Isaias Martinez MD      SCRUB NURSE:  Ashly.      CIRCULATING NURSES: Raine Arroyo and Bryson.      CRNA:  Diane.      PREOPERATIVE DIAGNOSIS:  Suspected periodontal disease and dental caries.      POSTOPERATIVE DIAGNOSES:  Dental caries, fractured restoration and generalized gingivitis.      DESCRIPTION OF PROCEDURE:  The patient was brought into the operating room and draped in the usual customary [facility], [place] fashion, including a mouth prep of 1.5 % hydrogen peroxide.  Following the timeout procedures,  general anesthesia was administered through the patient's endotracheal tube.  A bilateral dental examination was performed.  A moist throat pack was placed at 8:21 a.m.  Clinical examination revealed multiple grossly decayed teeth.  Radiographic examination revealed normal bone trabeculation, several coronal radiolucencies consistent with dental caries on tooth 13, 14 and 15, and impacted #16.  Local anesthetic used was lidocaine 1% and 1:100,000 epinephrine.  The total amount dispersed was 5 mL.   Following procedures were performed.   Surgical extractions were performed on the maxillary left second premolar, maxillary left first maxillary molar,  maxillary left second maxillary molar.  All extractions were performed without complications.  Tooth #16 was not visualized and was not removed. Surgicel was placed at all sites.  Three discontinuous Vicryl sutures were placed on those sites.  Gauze hemostasis was achieved.  The throat pack was removed with suction at 8:47 a.m.  The oropharynx was inspected and found to be clear.  Estimated blood loss was 1 mL.  The attending doctor, Dr. Jinny Sanford, was present for the entire procedure.  The patient was turned over to ENT for tumor resection and reconstruction.        JINNY SANFORD DDS       As dictated by: SHE SIFUENTES DMD            D: 2020   T: 2020   MT: MORTEZA      Name:     MONSE MANZANARES   MRN:      [id number]        Account:        [id number]   :      1960           Procedure Date: 2020      Document: [id number]

## 2020-04-24 ENCOUNTER — APPOINTMENT (OUTPATIENT)
Dept: OCCUPATIONAL THERAPY | Facility: CLINIC | Age: 60
DRG: 129 | End: 2020-04-24
Attending: STUDENT IN AN ORGANIZED HEALTH CARE EDUCATION/TRAINING PROGRAM
Payer: COMMERCIAL

## 2020-04-24 ENCOUNTER — ANESTHESIA EVENT (OUTPATIENT)
Dept: SURGERY | Facility: CLINIC | Age: 60
DRG: 129 | End: 2020-04-24
Payer: COMMERCIAL

## 2020-04-24 LAB
ALBUMIN SERPL-MCNC: 3.3 G/DL (ref 3.4–5)
ANION GAP SERPL CALCULATED.3IONS-SCNC: 6 MMOL/L (ref 3–14)
BASE EXCESS BLDA CALC-SCNC: 1.2 MMOL/L
BUN SERPL-MCNC: 17 MG/DL (ref 7–30)
CA-I BLD-MCNC: 4.8 MG/DL (ref 4.4–5.2)
CALCIUM SERPL-MCNC: 9 MG/DL (ref 8.5–10.1)
CHLORIDE SERPL-SCNC: 107 MMOL/L (ref 94–109)
CO2 SERPL-SCNC: 25 MMOL/L (ref 20–32)
CREAT SERPL-MCNC: 1.16 MG/DL (ref 0.66–1.25)
ERYTHROCYTE [DISTWIDTH] IN BLOOD BY AUTOMATED COUNT: 12.1 % (ref 10–15)
GFR SERPL CREATININE-BSD FRML MDRD: 69 ML/MIN/{1.73_M2}
GLUCOSE BLD-MCNC: 120 MG/DL (ref 70–99)
GLUCOSE BLDC GLUCOMTR-MCNC: 112 MG/DL (ref 70–99)
GLUCOSE BLDC GLUCOMTR-MCNC: 115 MG/DL (ref 70–99)
GLUCOSE BLDC GLUCOMTR-MCNC: 120 MG/DL (ref 70–99)
GLUCOSE BLDC GLUCOMTR-MCNC: 121 MG/DL (ref 70–99)
GLUCOSE BLDC GLUCOMTR-MCNC: 121 MG/DL (ref 70–99)
GLUCOSE BLDC GLUCOMTR-MCNC: 123 MG/DL (ref 70–99)
GLUCOSE BLDC GLUCOMTR-MCNC: 124 MG/DL (ref 70–99)
GLUCOSE BLDC GLUCOMTR-MCNC: 128 MG/DL (ref 70–99)
GLUCOSE BLDC GLUCOMTR-MCNC: 129 MG/DL (ref 70–99)
GLUCOSE BLDC GLUCOMTR-MCNC: 132 MG/DL (ref 70–99)
GLUCOSE BLDC GLUCOMTR-MCNC: 98 MG/DL (ref 70–99)
GLUCOSE SERPL-MCNC: 130 MG/DL (ref 70–99)
HBA1C MFR BLD: 5.8 % (ref 0–5.6)
HCO3 BLD-SCNC: 26 MMOL/L (ref 21–28)
HCT VFR BLD AUTO: 37.8 % (ref 40–53)
HGB BLD-MCNC: 11.4 G/DL (ref 13.3–17.7)
HGB BLD-MCNC: 12.4 G/DL (ref 13.3–17.7)
MAGNESIUM SERPL-MCNC: 2.4 MG/DL (ref 1.6–2.3)
MCH RBC QN AUTO: 28.5 PG (ref 26.5–33)
MCHC RBC AUTO-ENTMCNC: 32.8 G/DL (ref 31.5–36.5)
MCV RBC AUTO: 87 FL (ref 78–100)
O2/TOTAL GAS SETTING VFR VENT: 64 %
PCO2 BLD: 39 MM HG (ref 35–45)
PH BLD: 7.43 PH (ref 7.35–7.45)
PHOSPHATE SERPL-MCNC: 2.9 MG/DL (ref 2.5–4.5)
PLATELET # BLD AUTO: 138 10E9/L (ref 150–450)
PO2 BLD: 160 MM HG (ref 80–105)
POTASSIUM BLD-SCNC: 3.9 MMOL/L (ref 3.4–5.3)
POTASSIUM SERPL-SCNC: 4.1 MMOL/L (ref 3.4–5.3)
PREALB SERPL IA-MCNC: 15 MG/DL (ref 15–45)
RBC # BLD AUTO: 4.35 10E12/L (ref 4.4–5.9)
SODIUM BLD-SCNC: 140 MMOL/L (ref 133–144)
SODIUM SERPL-SCNC: 138 MMOL/L (ref 133–144)
TSH SERPL DL<=0.005 MIU/L-ACNC: 0.22 MU/L (ref 0.4–4)
WBC # BLD AUTO: 7.9 10E9/L (ref 4–11)

## 2020-04-24 PROCEDURE — 97166 OT EVAL MOD COMPLEX 45 MIN: CPT | Mod: GO | Performed by: OCCUPATIONAL THERAPIST

## 2020-04-24 PROCEDURE — 25000125 ZZHC RX 250: Performed by: STUDENT IN AN ORGANIZED HEALTH CARE EDUCATION/TRAINING PROGRAM

## 2020-04-24 PROCEDURE — 37000008 ZZH ANESTHESIA TECHNICAL FEE, 1ST 30 MIN: Performed by: OTOLARYNGOLOGY

## 2020-04-24 PROCEDURE — 27210429 ZZH NUTRITION PRODUCT INTERMEDIATE LITER

## 2020-04-24 PROCEDURE — 25000128 H RX IP 250 OP 636: Performed by: NURSE ANESTHETIST, CERTIFIED REGISTERED

## 2020-04-24 PROCEDURE — 94003 VENT MGMT INPAT SUBQ DAY: CPT

## 2020-04-24 PROCEDURE — 25000128 H RX IP 250 OP 636: Performed by: STUDENT IN AN ORGANIZED HEALTH CARE EDUCATION/TRAINING PROGRAM

## 2020-04-24 PROCEDURE — 84132 ASSAY OF SERUM POTASSIUM: CPT | Performed by: INTERNAL MEDICINE

## 2020-04-24 PROCEDURE — 25000128 H RX IP 250 OP 636: Performed by: OTOLARYNGOLOGY

## 2020-04-24 PROCEDURE — 97535 SELF CARE MNGMENT TRAINING: CPT | Mod: GO | Performed by: OCCUPATIONAL THERAPIST

## 2020-04-24 PROCEDURE — 25000128 H RX IP 250 OP 636

## 2020-04-24 PROCEDURE — 0W930ZZ DRAINAGE OF ORAL CAVITY AND THROAT, OPEN APPROACH: ICD-10-PCS | Performed by: OTOLARYNGOLOGY

## 2020-04-24 PROCEDURE — 97530 THERAPEUTIC ACTIVITIES: CPT | Mod: GO | Performed by: OCCUPATIONAL THERAPIST

## 2020-04-24 PROCEDURE — 20000004 ZZH R&B ICU UMMC

## 2020-04-24 PROCEDURE — 82947 ASSAY GLUCOSE BLOOD QUANT: CPT | Performed by: INTERNAL MEDICINE

## 2020-04-24 PROCEDURE — 84134 ASSAY OF PREALBUMIN: CPT | Performed by: OTOLARYNGOLOGY

## 2020-04-24 PROCEDURE — 84443 ASSAY THYROID STIM HORMONE: CPT | Performed by: OTOLARYNGOLOGY

## 2020-04-24 PROCEDURE — 99233 SBSQ HOSP IP/OBS HIGH 50: CPT | Mod: GC | Performed by: SURGERY

## 2020-04-24 PROCEDURE — 27210794 ZZH OR GENERAL SUPPLY STERILE: Performed by: OTOLARYNGOLOGY

## 2020-04-24 PROCEDURE — 27210995 ZZH RX 272: Performed by: OTOLARYNGOLOGY

## 2020-04-24 PROCEDURE — 36000062 ZZH SURGERY LEVEL 4 1ST 30 MIN - UMMC: Performed by: OTOLARYNGOLOGY

## 2020-04-24 PROCEDURE — 83735 ASSAY OF MAGNESIUM: CPT | Performed by: OTOLARYNGOLOGY

## 2020-04-24 PROCEDURE — 40000893 ZZH STATISTIC PT IP EVAL DEFER

## 2020-04-24 PROCEDURE — 25000132 ZZH RX MED GY IP 250 OP 250 PS 637: Performed by: STUDENT IN AN ORGANIZED HEALTH CARE EDUCATION/TRAINING PROGRAM

## 2020-04-24 PROCEDURE — 40000275 ZZH STATISTIC RCP TIME EA 10 MIN

## 2020-04-24 PROCEDURE — 25000125 ZZHC RX 250: Performed by: OTOLARYNGOLOGY

## 2020-04-24 PROCEDURE — 25800030 ZZH RX IP 258 OP 636: Performed by: STUDENT IN AN ORGANIZED HEALTH CARE EDUCATION/TRAINING PROGRAM

## 2020-04-24 PROCEDURE — P9041 ALBUMIN (HUMAN),5%, 50ML: HCPCS

## 2020-04-24 PROCEDURE — 00000146 ZZHCL STATISTIC GLUCOSE BY METER IP

## 2020-04-24 PROCEDURE — 84295 ASSAY OF SERUM SODIUM: CPT | Performed by: INTERNAL MEDICINE

## 2020-04-24 PROCEDURE — 0W330ZZ CONTROL BLEEDING IN ORAL CAVITY AND THROAT, OPEN APPROACH: ICD-10-PCS | Performed by: OTOLARYNGOLOGY

## 2020-04-24 PROCEDURE — 85027 COMPLETE CBC AUTOMATED: CPT | Performed by: OTOLARYNGOLOGY

## 2020-04-24 PROCEDURE — 80069 RENAL FUNCTION PANEL: CPT | Performed by: OTOLARYNGOLOGY

## 2020-04-24 PROCEDURE — 83036 HEMOGLOBIN GLYCOSYLATED A1C: CPT | Performed by: OTOLARYNGOLOGY

## 2020-04-24 PROCEDURE — 25000566 ZZH SEVOFLURANE, EA 15 MIN: Performed by: OTOLARYNGOLOGY

## 2020-04-24 PROCEDURE — 37000009 ZZH ANESTHESIA TECHNICAL FEE, EACH ADDTL 15 MIN: Performed by: OTOLARYNGOLOGY

## 2020-04-24 PROCEDURE — 36000064 ZZH SURGERY LEVEL 4 EA 15 ADDTL MIN - UMMC: Performed by: OTOLARYNGOLOGY

## 2020-04-24 PROCEDURE — 82330 ASSAY OF CALCIUM: CPT | Performed by: INTERNAL MEDICINE

## 2020-04-24 PROCEDURE — 40000014 ZZH STATISTIC ARTERIAL MONITORING DAILY

## 2020-04-24 PROCEDURE — 25000125 ZZHC RX 250: Performed by: NURSE ANESTHETIST, CERTIFIED REGISTERED

## 2020-04-24 PROCEDURE — 82803 BLOOD GASES ANY COMBINATION: CPT | Performed by: INTERNAL MEDICINE

## 2020-04-24 RX ORDER — PROPOFOL 10 MG/ML
INJECTION, EMULSION INTRAVENOUS PRN
Status: DISCONTINUED | OUTPATIENT
Start: 2020-04-24 | End: 2020-04-24

## 2020-04-24 RX ORDER — SODIUM CHLORIDE, SODIUM LACTATE, POTASSIUM CHLORIDE, CALCIUM CHLORIDE 600; 310; 30; 20 MG/100ML; MG/100ML; MG/100ML; MG/100ML
INJECTION, SOLUTION INTRAVENOUS CONTINUOUS PRN
Status: DISCONTINUED | OUTPATIENT
Start: 2020-04-24 | End: 2020-04-24

## 2020-04-24 RX ORDER — DEXMEDETOMIDINE HYDROCHLORIDE 4 UG/ML
0.2-1.2 INJECTION, SOLUTION INTRAVENOUS CONTINUOUS
Status: DISCONTINUED | OUTPATIENT
Start: 2020-04-24 | End: 2020-04-24 | Stop reason: HOSPADM

## 2020-04-24 RX ORDER — HYDROMORPHONE HCL/0.9% NACL/PF 0.2MG/0.2
.2-.3 SYRINGE (ML) INTRAVENOUS
Status: DISCONTINUED | OUTPATIENT
Start: 2020-04-24 | End: 2020-04-27

## 2020-04-24 RX ORDER — AMINO AC/PROTEIN HYDR/WHEY PRO 10G-100/30
1 LIQUID (ML) ORAL DAILY
Status: DISCONTINUED | OUTPATIENT
Start: 2020-04-24 | End: 2020-04-29 | Stop reason: HOSPADM

## 2020-04-24 RX ORDER — AMOXICILLIN 250 MG
1 CAPSULE ORAL
Status: DISCONTINUED | OUTPATIENT
Start: 2020-04-24 | End: 2020-04-27

## 2020-04-24 RX ORDER — OXYCODONE HYDROCHLORIDE 5 MG/1
5-10 TABLET ORAL EVERY 4 HOURS PRN
Status: DISCONTINUED | OUTPATIENT
Start: 2020-04-24 | End: 2020-04-29 | Stop reason: HOSPADM

## 2020-04-24 RX ORDER — LABETALOL 20 MG/4 ML (5 MG/ML) INTRAVENOUS SYRINGE
10 EVERY 6 HOURS PRN
Status: DISCONTINUED | OUTPATIENT
Start: 2020-04-24 | End: 2020-04-29 | Stop reason: HOSPADM

## 2020-04-24 RX ORDER — ALBUMIN, HUMAN INJ 5% 5 %
12.5 SOLUTION INTRAVENOUS ONCE
Status: COMPLETED | OUTPATIENT
Start: 2020-04-24 | End: 2020-04-24

## 2020-04-24 RX ORDER — HYDRALAZINE HYDROCHLORIDE 20 MG/ML
10 INJECTION INTRAMUSCULAR; INTRAVENOUS EVERY 6 HOURS PRN
Status: DISCONTINUED | OUTPATIENT
Start: 2020-04-24 | End: 2020-04-29 | Stop reason: HOSPADM

## 2020-04-24 RX ORDER — FENTANYL CITRATE 50 UG/ML
INJECTION, SOLUTION INTRAMUSCULAR; INTRAVENOUS PRN
Status: DISCONTINUED | OUTPATIENT
Start: 2020-04-24 | End: 2020-04-24

## 2020-04-24 RX ORDER — DEXTROSE MONOHYDRATE 100 MG/ML
INJECTION, SOLUTION INTRAVENOUS CONTINUOUS PRN
Status: DISCONTINUED | OUTPATIENT
Start: 2020-04-24 | End: 2020-04-29 | Stop reason: HOSPADM

## 2020-04-24 RX ORDER — ALBUMIN, HUMAN INJ 5% 5 %
SOLUTION INTRAVENOUS
Status: COMPLETED
Start: 2020-04-24 | End: 2020-04-24

## 2020-04-24 RX ORDER — ACETAMINOPHEN 325 MG/1
975 TABLET ORAL EVERY 8 HOURS
Status: DISCONTINUED | OUTPATIENT
Start: 2020-04-24 | End: 2020-04-29 | Stop reason: HOSPADM

## 2020-04-24 RX ADMIN — FENTANYL CITRATE 50 MCG: 50 INJECTION, SOLUTION INTRAMUSCULAR; INTRAVENOUS at 21:54

## 2020-04-24 RX ADMIN — FENTANYL CITRATE 100 MCG: 50 INJECTION, SOLUTION INTRAMUSCULAR; INTRAVENOUS at 19:30

## 2020-04-24 RX ADMIN — ALBUMIN HUMAN 12.5 G: 50 SOLUTION INTRAVENOUS at 00:18

## 2020-04-24 RX ADMIN — Medication 0.2 MG: at 18:39

## 2020-04-24 RX ADMIN — SODIUM CHLORIDE, POTASSIUM CHLORIDE, SODIUM LACTATE AND CALCIUM CHLORIDE: 600; 310; 30; 20 INJECTION, SOLUTION INTRAVENOUS at 05:39

## 2020-04-24 RX ADMIN — DEXAMETHASONE SODIUM PHOSPHATE 8 MG: 4 INJECTION, SOLUTION INTRAMUSCULAR; INTRAVENOUS at 08:11

## 2020-04-24 RX ADMIN — FENTANYL CITRATE 50 MCG: 50 INJECTION, SOLUTION INTRAMUSCULAR; INTRAVENOUS at 20:39

## 2020-04-24 RX ADMIN — ROCURONIUM BROMIDE 20 MG: 10 INJECTION INTRAVENOUS at 19:45

## 2020-04-24 RX ADMIN — MULTIVITAMIN 15 ML: LIQUID ORAL at 11:25

## 2020-04-24 RX ADMIN — ALBUMIN HUMAN 12.5 G: 0.05 INJECTION, SOLUTION INTRAVENOUS at 00:18

## 2020-04-24 RX ADMIN — ENOXAPARIN SODIUM 40 MG: 40 INJECTION SUBCUTANEOUS at 08:11

## 2020-04-24 RX ADMIN — AMPICILLIN SODIUM AND SULBACTAM SODIUM 3 G: 2; 1 INJECTION, POWDER, FOR SOLUTION INTRAMUSCULAR; INTRAVENOUS at 18:46

## 2020-04-24 RX ADMIN — PROPOFOL 30 MG: 10 INJECTION, EMULSION INTRAVENOUS at 20:39

## 2020-04-24 RX ADMIN — SUGAMMADEX 200 MG: 100 INJECTION, SOLUTION INTRAVENOUS at 22:13

## 2020-04-24 RX ADMIN — ASPIRIN 325 MG ORAL TABLET 325 MG: 325 PILL ORAL at 08:11

## 2020-04-24 RX ADMIN — ROCURONIUM BROMIDE 60 MG: 10 INJECTION INTRAVENOUS at 19:30

## 2020-04-24 RX ADMIN — DEXMEDETOMIDINE HYDROCHLORIDE 0.5 MCG/KG/HR: 4 INJECTION, SOLUTION INTRAVENOUS at 20:30

## 2020-04-24 RX ADMIN — CHLORHEXIDINE GLUCONATE 0.12% ORAL RINSE 15 ML: 1.2 LIQUID ORAL at 05:47

## 2020-04-24 RX ADMIN — FENTANYL CITRATE 50 MCG: 50 INJECTION, SOLUTION INTRAMUSCULAR; INTRAVENOUS at 21:29

## 2020-04-24 RX ADMIN — PROPOFOL 15 MCG/KG/MIN: 10 INJECTION, EMULSION INTRAVENOUS at 05:58

## 2020-04-24 RX ADMIN — BACITRACIN ZINC: 500 OINTMENT TOPICAL at 02:02

## 2020-04-24 RX ADMIN — PROPOFOL 100 MG: 10 INJECTION, EMULSION INTRAVENOUS at 19:30

## 2020-04-24 RX ADMIN — Medication 0.2 MG: at 10:48

## 2020-04-24 RX ADMIN — ACETAMINOPHEN 975 MG: 325 TABLET ORAL at 10:37

## 2020-04-24 RX ADMIN — AMPICILLIN SODIUM AND SULBACTAM SODIUM 3 G: 2; 1 INJECTION, POWDER, FOR SOLUTION INTRAMUSCULAR; INTRAVENOUS at 05:38

## 2020-04-24 RX ADMIN — BACITRACIN ZINC: 500 OINTMENT TOPICAL at 10:38

## 2020-04-24 RX ADMIN — DEXMEDETOMIDINE HYDROCHLORIDE 12 MCG: 100 INJECTION, SOLUTION INTRAVENOUS at 19:23

## 2020-04-24 RX ADMIN — CHLORHEXIDINE GLUCONATE 0.12% ORAL RINSE 15 ML: 1.2 LIQUID ORAL at 11:40

## 2020-04-24 RX ADMIN — ROCURONIUM BROMIDE 20 MG: 10 INJECTION INTRAVENOUS at 20:32

## 2020-04-24 RX ADMIN — AMPICILLIN SODIUM AND SULBACTAM SODIUM 3 G: 2; 1 INJECTION, POWDER, FOR SOLUTION INTRAMUSCULAR; INTRAVENOUS at 11:29

## 2020-04-24 RX ADMIN — Medication 1 PACKET: at 14:56

## 2020-04-24 RX ADMIN — Medication 0.2 MG: at 17:59

## 2020-04-24 RX ADMIN — POTASSIUM PHOSPHATE, MONOBASIC AND POTASSIUM PHOSPHATE, DIBASIC 15 MMOL: 224; 236 INJECTION, SOLUTION INTRAVENOUS at 00:40

## 2020-04-24 RX ADMIN — SODIUM CHLORIDE, POTASSIUM CHLORIDE, SODIUM LACTATE AND CALCIUM CHLORIDE: 600; 310; 30; 20 INJECTION, SOLUTION INTRAVENOUS at 19:17

## 2020-04-24 RX ADMIN — ROCURONIUM BROMIDE 10 MG: 10 INJECTION INTRAVENOUS at 21:21

## 2020-04-24 RX ADMIN — DEXAMETHASONE SODIUM PHOSPHATE 8 MG: 4 INJECTION, SOLUTION INTRAMUSCULAR; INTRAVENOUS at 00:15

## 2020-04-24 RX ADMIN — MIDAZOLAM 1 MG: 1 INJECTION INTRAMUSCULAR; INTRAVENOUS at 19:30

## 2020-04-24 RX ADMIN — PROPOFOL 35 MCG/KG/MIN: 10 INJECTION, EMULSION INTRAVENOUS at 01:00

## 2020-04-24 ASSESSMENT — ACTIVITIES OF DAILY LIVING (ADL)
PREVIOUS_RESPONSIBILITIES: MEAL PREP;HOUSEKEEPING;LAUNDRY;SHOPPING;YARDWORK;MEDICATION MANAGEMENT;FINANCES;DRIVING;WORK
ADLS_ACUITY_SCORE: 15
ADLS_ACUITY_SCORE: 13
ADLS_ACUITY_SCORE: 15
ADLS_ACUITY_SCORE: 13
ADLS_ACUITY_SCORE: 15

## 2020-04-24 ASSESSMENT — MIFFLIN-ST. JEOR: SCORE: 1761

## 2020-04-24 NOTE — PROGRESS NOTES
[facility], [place]  Procedure Note          Extubation:       Norris Reyes  MRN# [id number]   April 24, 2020, 8:46 AM         Patient extubated at: April 24, 2020, 8:46 AM   Supplemental Oxygen: Room air   Cough: The cough is strong   Secretion Mode: Able to clear   Secretion Amount: Small amount, thick and brown in color   Respiratory Exam:: Breath sounds: equal and clear     Location: all lobes   Skin Exam:: Patient color: natural   Patient Status: Currently appears comfortable   Arterial Blood Gasses:          Recorded by Margie Mauro

## 2020-04-24 NOTE — PROGRESS NOTES
ENT FLAP CHECK    S: No issues with nursing flap checks. MAPs have been >60 without intervention.     O:  /73 (BP Location: Left arm)   Pulse 90   Temp 98.2  F (36.8  C) (Axillary)   Resp 15   Ht 1.829 m (6')   Wt 88.7 kg (195 lb 8.8 oz)   SpO2 99%   BMI 26.52 kg/m      Gen: Laying in bed, sedated  Resp: Normal SaO2 on ventilator. Orotracheal tube taped to right side of face.  Mouth: Left buccal flap is pale and soft. Strong handheld and implantable dopplers. Neck is flat. JOSUE drains x2 holding suction with sanguinous output  MSK: Right arm dressing intact. Fingers are wwp.    A/P: Stable flap.    -Continue plan of care    Juan Pablo Pang MD  Otolaryngology-Head & Neck Surgery PGY3

## 2020-04-24 NOTE — ANESTHESIA PREPROCEDURE EVALUATION
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) [C06.0]   Procedure(s):  wide local excision of left buccal mucosa squamous cell carcinoma  left modified radical neck dissection  possible Tracheostomy placement, nasogastric tube placement  right forearm free flap  split thickness skin graft from right thigh  Dental exam and SURGICAL EXTRACTION, Teeth     LABS:  CBC:   Lab Results   Component Value Date    WBC 7.9 2020    WBC 11.3 (H) 2020    HGB 12.4 (L) 2020    HGB 13.4 2020    HCT 37.8 (L) 2020    HCT 40.8 2020     (L) 2020     2020     BMP:   Lab Results   Component Value Date     2020     2020    POTASSIUM 4.1 2020    POTASSIUM 4.3 2020    CHLORIDE 107 2020    CHLORIDE 107 2020    CO2 25 2020    CO2 26 2020    BUN 17 2020    BUN 17 2020    CR 1.16 2020    CR 1.20 2020     (H) 2020     (H) 2020     COAGS: No results found for: PTT, INR, FIBR  POC:   Lab Results   Component Value Date     (H) 2020     OTHER:   Lab Results   Component Value Date    PH 7.43 2020    LACT 1.3 2020    A1C 5.8 (H) 2020    OLGA LIDIA 9.0 2020    PHOS 2.9 2020    MAG 2.4 (H) 2020    ALBUMIN 3.3 (L) 2020    TSH 0.22 (L) 2020        Preop Vitals    BP Readings from Last 3 Encounters:   20 131/72   04/15/20 (!) 150/87   20 (!) 171/88    Pulse Readings from Last 3 Encounters:   20 75   04/15/20 89   04/08/20 82      Resp Readings from Last 3 Encounters:   20 16   04/15/20 19   20 18    SpO2 Readings from Last 3 Encounters:   20 96%   04/15/20 98%      Temp Readings from Last 1 Encounters:   20 37.1  C (98.7  F)  (Axillary)    Ht Readings from Last 1 Encounters:   04/23/20 1.829 m (6')      Wt Readings from Last 1 Encounters:   04/24/20 90.3 kg (199 lb 1.2 oz)    Estimated body mass index is 27 kg/m  as calculated from the following:    Height as of 4/23/20: 1.829 m (6').    Weight as of 4/24/20: 90.3 kg (199 lb 1.2 oz).     LDA:  Peripheral IV 04/23/20 Left Hand (Active)   Site Assessment WD 04/24/20 1600   Line Status Infusing;Checked every 1-2 hour 04/24/20 1600   Phlebitis Scale 0-->no symptoms 04/24/20 1600   Infiltration Scale 0 04/24/20 1600   Extravasation? No 04/24/20 1600   Number of days: 1       Peripheral IV 04/23/20 Left Lower forearm (Active)   Site Assessment [facility] 04/24/20 1600   Line Status [place] locked 04/24/20 1600   Phlebitis Scale 0-->no symptoms 04/24/20 1600   Infiltration Scale 0 04/24/20 1600   Extravasation? No 04/24/20 1600   Dressing Intervention Dressing reinforced 04/24/20 1200   Number of days: 1       Closed/Suction Drain 1 Right Other (Comment) Other (Comment) 10 [place] (Active)   Site Description [facility] 04/24/20 1600   Dressing Status Other (comment) 04/24/20 1600   Drainage Appearance Bloody/Bright Red;Dark Red 04/24/20 1600   Status To bulb suction 04/24/20 1600   Output (ml) 11 ml 04/24/20 1600   Number of days: 1       Closed/Suction Drain 2 Left Neck Bulb 10 Serbian (Active)   Site Description [facility] 04/24/20 1600   Dressing Status Other (comment) 04/24/20 1600   Drainage Appearance Bloody/Bright Red;Dark Red 04/24/20 1600   Status To bulb suction 04/24/20 1600   Output (ml) 10 ml 04/24/20 1600   Number of days: 1       Closed/Suction Drain 3 Left Neck Bulb 10 Serbian (Active)   Site Description [facility] 04/24/20 1600   Dressing Status Other (comment) 04/24/20 1600   Drainage Appearance Bloody/Bright Red;Dark Red 04/24/20 1600   Status To bulb suction 04/24/20 1600   Output (ml) 15 ml 04/24/20 1600   Number of days: 1       Negative Pressure Wound Therapy Arm Lower;Right (Active)   Wound Type  Surgical 04/24/20 1600   Cycle Continuous 04/24/20 1600   Target Pressure (mmHg) 125 04/24/20 1600   Dressing Status Clean, dry, intact 04/24/20 1600   Drainage Amount None 04/24/20 1600   Output (ml) 0 ml 04/24/20 1600   Number of days: 1       NG/OG/NJ Tube Nasogastric 10 fr (Active)   Site Description [facility]DL 04/24/20 1600   Status Enteral Feedings 04/24/20 1600   Placement Confirmation X-ray;[place] unchanged 04/24/20 0400   [place] (cm marking) at nare/mouth 59 cm 04/24/20 1600   Flush/Free Water (mL) 30 mL 04/24/20 1600   Number of days: 1        Past Medical History:   Diagnosis Date     Autoimmune disease (H) 2/11/2020     Hypertension 2/11/2020    Losartan     Nephrolithiasis      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)       Past Surgical History:   Procedure Laterality Date     DISSECTION RADICAL NECK MODIFIED Left 4/23/2020    Procedure: left modified radical neck dissection;  Surgeon: Kameron Dior MD;  Location: [facility] OR     EXCISE LESION INTRAORAL Left 4/23/2020    Procedure: wide local excision of left buccal mucosa squamous cell carcinoma;  Surgeon: Kameron Dior MD;  Location: [facility] OR     GRAFT FREE VASCULARIZED (LOCATION) Right 4/23/2020    Procedure: right forearm free flap;  Surgeon: Opal Castañeda MD;  Location: [facility] OR     GRAFT SKIN SPLIT THICKNESS FROM EXTREMITY Right 4/23/2020    Procedure: split thickness skin graft from right thigh;  Surgeon: Opal Castañeda MD;  Location: [facility] OR     ODONTECTOMY N/A 4/23/2020    Procedure: Dental Exam and Surgical Extraction Teeth #13, 14 and 15, NASOGASTRIC TUBE PLACEMENT;  Surgeon: Diana Diehl DDS;  Location: [facility] OR      No Known Allergies     Anesthesia Evaluation     .             ROS/MED HX    ENT/Pulmonary: Comment: L buccal SCC w/ mets to local lymph nodes      (+)other ENT- s/p neck disseciton and free flap wihtin last 48hours, extubated this morning, now with hematoma at site of free flap, , . .   COPD: s/p radical neck  dissection with free flap, now with bleeding and hematoma.   Neurologic:  - neg neurologic ROS     Cardiovascular:     (+) Dyslipidemia, hypertension----. : . . . :. .       METS/Exercise Tolerance:  >4 METS   Hematologic:  - neg hematologic  ROS       Musculoskeletal:  - neg musculoskeletal ROS       GI/Hepatic:  - neg GI/hepatic ROS       Renal/Genitourinary:  - ROS Renal section negative       Endo:  - neg endo ROS       Psychiatric:  - neg psychiatric ROS       Infectious Disease:  - neg infectious disease ROS       Malignancy:   (+) Malignancy           Other:                         PHYSICAL EXAM:   Mental Status/Neuro: A/A/O   Airway: Facies: Challenging  Mallampati: III  Mouth/Opening: Limited  TM distance: > 6 cm  Neck ROM: Full   Respiratory: Auscultation: CTAB     Resp. Rate: Normal     Resp. Effort: Normal      CV: Rhythm: Regular  Rate: Age appropriate  Heart: Normal Sounds  Edema: None   Comments: Face and airway distortion secondary to dissection and free flap                     Assessment:   ASA SCORE: 2 emergent   H&P: History and physical reviewed and following examination; no interval change.    NPO Status: NPO Appropriate     Plan:   Anes. Type:  General   Pre-Medication: Dexmedetomidine   Induction:  IV (Standard)   Airway: ETT; Oral; FOB; CMAC/VL   Access/Monitoring: PIV; 2nd PIV; A-Line   Maintenance: Balanced     Advanced Monitoring: BIS     Postop Plan:   Postop Pain: Opioids  Postop Sedation/Airway: Not planned  Disposition: Inpatient/Admit     PONV Management:   Adult Risk Factors:, Postop Opioids   Prevention: Ondansetron, Dexamethasone     CONSENT: Direct conversation   Plan and risks discussed with: Patient          Comments for Plan/Consent:  Plan:  Will discuss with surgical team.  May need awake fiberoptic intubation given recent surgery  Dexmedotomidine infusion    MD Gianni Tripp MD

## 2020-04-24 NOTE — PROGRESS NOTES
CLINICAL NUTRITION SERVICES - ASSESSMENT NOTE     Nutrition Prescription    RECOMMENDATIONS FOR MDs/PROVIDERS TO ORDER:  Fluids and bowel regimen per team    Malnutrition Status:     Patient does not meet two of the established criteria necessary for diagnosing malnutrition    Recommendations already ordered by Registered Dietitian (RD):  ** Per patient weight and estimated need post-op, with transition to bolus tube feeds, TwoCal HN is a more energy dense and appropriate formula for this patient. Therefore patient will likely better tolerate a decrease in overall volume, given high kcals and protein needs.    RD to order the following TF regimen as follows: TwoCal HN @ 50 mL/hr (1200 mL/day) to provide 2400 kcals (27 kcal/kg/day), 101 g PRO (1.1 g/kg/day), 840 mL H2O, 263 g CHO and 6 g Fiber daily. + 1pkt Prosource: 112 g pro (1.2), 2440 kcals (27)     Future/Additional Recommendations:  Ability to transition to bolus TF  Tolerance to TF/lytes  Once tolerating continuous goal TF and approp to transition to [place] Bolus regimen, recommend do so as follows: begin first bolus with 0.5 cans (125 ml) and if tolerates after approx 4 hrs without GI complaints and/or residuals < 500 ml, rec adv each subsequent bolus by 0.5 cans (125 ml) every ~4 hrs until reach goal regimen of 2 can + 2 can + 1 can for a total of TwoCal HN, 5 cans daily. TwoCal, 5 cans daily: 1200 ml, 2400 kcals, 101 g pro + 1pkt Prosource ( 112 g pro, 2440 kcals)  *Do not give feedings at night while pt asleep (during the day only).    Regimen:TwoCal, 5 cans daily: 1200 ml, 2400 kcals, 101 g pro + 1pkt Prosource ( 112 g pro, 2440 kcals)     REASON FOR ASSESSMENT  Norris Reyes is a/an 59 year old male assessed by the dietitian for Provider Order - Registered Dietitian to Assess and Order TF per Medical Nutrition Therapy Protocol    NUTRITION HISTORY  Per patient, reported a 5# weight loss in the past 1 month. Reported he otherwise had an okay appetite  "PTA and was eating well. Unable to give much more information at this time.     CURRENT NUTRITION ORDERS  Diet: NPO    LABS  Labs reviewed. Phos: 2.0> 2.9    MEDICATIONS  Medications reviewed    ANTHROPOMETRICS  Height: 182.9 cm (6' 0\")  Most Recent Weight: 90.3 kg (199 lb 1.2 oz)    IBW: 80.9 kg  BMI: Overweight BMI 25-29.9  Weight History: ~3% wt loss in the past 1 month- not significant   Wt Readings from Last 10 Encounters:   04/24/20 90.3 kg (199 lb 1.2 oz)   04/15/20 90.7 kg (200 lb)   04/08/20 91.2 kg (201 lb)     Dosing Weight: 90 kg    ASSESSED NUTRITION NEEDS  Estimated Energy Needs: 9705-6124 kcals/day (25 - 30 kcals/kg)  Justification: Maintenance and Post-op  Estimated Protein Needs: 108-135 grams protein/day (1.2 - 1.5 grams of pro/kg)  Justification: Hypercatabolism with acute illness, Increased needs and Post-op  Estimated Fluid Needs: 1 mL/kcal/day  Justification: Maintenance and Per provider pending fluid status    PHYSICAL FINDINGS  See malnutrition section below.  NGT placed by ENT    MALNUTRITION  % Intake: Decreased intake does not meet criteria  % Weight Loss: Weight loss does not meet criteria  Subcutaneous Fat Loss: None observed  Muscle Loss: None observed  Fluid Accumulation/Edema: None noted  Malnutrition Diagnosis: Patient does not meet two of the established criteria necessary for diagnosing malnutrition    NUTRITION DIAGNOSIS  Inadequate oral intake related to pot-op ENT procedure inhibiting PO intakes as evidenced by need for EN to provide 100% of estimated needs via NGT at this time      INTERVENTIONS  Implementation  Nutrition Education: Provided education on role of RD and nutrition POC   Enteral Nutrition - Initiate     Goals  Total avg nutritional intake to meet a minimum of 25 kcal/kg and 1.2 g PRO/kg daily (per dosing wt 90 kg).     Monitoring/Evaluation  Progress toward goals will be monitored and evaluated per protocol.      Anna Brewer, SHAZIA, MS, LD  SICU: [phone]    "

## 2020-04-24 NOTE — PROGRESS NOTES
ENT Flap Check    Subjective/Events: Patient was extubated without issue. Says he is happy to have ET tube out. Denies pain. No issues with flap per RN. Endorses some numbness on index finger, but denies pain and can move it easily.    Objective  General: sitting up in chair  Neuro: alert and oriented, speaking easily  HEENT: flap is appropriately pale and warm with hair on cutaneous portion, strong doppler signal to handheld and continuous doppler, sutures holding with small area of dehiscence on posterior-superior corner. Neck is soft with incision on left c/d/i. Drain holding suction, serosanguinous.  Pulm: breathing comfortably on RA  Msk: Right arm splint and dressing in place, able to move fingers easily    Assessment  Stable flap    Plan  - next MD flap check at 4PM    Tray Bhat MD  ENT Resident PGY1

## 2020-04-24 NOTE — PLAN OF CARE
[id number]  NEURO: PERRL. SCHMIDT & follows commands. Pt remains calm on current sedation. Able to make needs known through writing pad. Denies pain. Denies numbness/tingling to all extremities.   CV:SR 70s-90s. No ectopy. BP labile at times. MAP mostly >60 w/o intervention (see previous note). D/w ENT BP soft at times & occasionally very briefly MAPs drop to 58-59 when sleeping but does not sustain & then quickly to 70s when awake w/o intervention  afebrile  RESP: CMV settings on 30%. Lungs clear. Small amount bloody secretions via ETT & orally. ETT taped in place  GI:NPO. NG verified by xray. Able to give meds via NG per ENT.   BG well controlled on insulin gtt (receiving decadron)  MIVF @ 100 ml/hr  :via banegas cath. UOP improved overnight     SHIFT EVENTS:     -Flap w/ no acute changes or issues overnight. Implanted & manual doppler remains audible/strong. Pale/warm. No swelling or change in color.      -JPsx3 w/ minimal bloody output     -Wound vac w/ no output. R hand remains mobile & fingertips w/ good blood return. Pt denies altered sensation     -R leg dressing w/ moist drainage     -250 albumin given for low UOP & MAPs <60 w/ improvement in UOP & MAPs. No additional boluses needed overnight.      Please see MAR/flowsheets for further interventions/assessments     PLAN: Continue to monitor pt & notify team w/ any acute concerns. Extubate. Continue Hourly flap checks. Needs PRN pain meds once extubated. Currently only prn fentanyl ordered.     Isidra Sprague, RN on 4/24/2020 at 5:19 AM

## 2020-04-24 NOTE — PROVIDER NOTIFICATION
SICU updated low UOP 40 mls over last 2 hrs. MAP borderline 57-60 on current sedation ( 100 fentanyl, 30 propofol). Pt initial MAPs were 90s when arrived from OR on 100 propofol. Weaning propofol as able to improve MAPs but hesitant to decrease further given instability of ETT. SICU verbalized understanding. Will give albumin bolus.     Isidra Sprague RN on 4/24/2020 at 12:08 AM

## 2020-04-24 NOTE — PROGRESS NOTES
04/24/20 1200   Quick Adds   Type of Visit Initial Occupational Therapy Evaluation   Living Environment   Lives With spouse   Living Arrangements house   Home Accessibility stairs to enter home   Number of Stairs, Main Entrance 6   Transportation Anticipated family or friend will provide;car, drives self   Living Environment Comment Pt drives self but has assist from wife.  Works from home with heavy tools.    Self-Care   Usual Activity Tolerance excellent   Current Activity Tolerance good   Regular Exercise Yes   Activity/Exercise Type walking   Exercise Amount/Frequency daily   Equipment Currently Used at Home none   Activity/Exercise/Self-Care Comment IND prior   Functional Level   Ambulation 0-->independent   Transferring 0-->independent   Toileting 0-->independent   Bathing 0-->independent   Dressing 0-->independent   Eating 0-->independent   Communication 0-->understands/communicates without difficulty   Cognition 0 - no cognition issues reported   Fall history within last six months no   Which of the above functional risks had a recent onset or change? none   Prior Functional Level Comment IND prior   General Information   Onset of Illness/Injury or Date of Surgery - Date 04/23/20   Referring Physician Jalen   Additional Occupational Profile Info/Pertinent History of Current Problem 59 year old male with a past medical history HTN and autoimmune disease (unspecified) admitted for surgical management of a 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 He underwent wide local excision of left buccal mucosa SCC with left modified radical neck dissection, NG tube placement, reconstruction with a right radial  forearm free flap, placement of split thickness skin graft to forearm, and dental exam, extraction of tooth #13, 14 and 15 with ENT on 4/23   Precautions/Limitations fall precautions;oxygen therapy device and L/min   Weight-Bearing Status - RUE nonweight-bearing   General Observations Pt extubated 2 hours  prior.  Pleasant and agreeable.    General Info Comments activity up w A   Cognitive Status Examination   Orientation orientation to person, place and time   Level of Consciousness alert   Cognitive Comment intact   Visual Perception   Visual Perception Comments intact   Sensory Examination   Sensory Comments reports numbness in R index finger   Pain Assessment   Patient Currently in Pain Yes, see Vital Sign flowsheet   Posture   Posture Comments intact   Range of Motion (ROM)   ROM Comment WFL limited exam 2/2 flap   Strength   Strength Comments NT   Coordination   Fine Motor Coordination deficits due to one handed technique   Mobility   Bed Mobility Comments Mod I with cues   Transfer Skills   Transfer Comments close CGA   Transfer Skill: Bed to Chair/Chair to Bed   Level of [place]: Bed to Chair stand-by assist   Transfer Skill: Sit to Stand   Level of [place]: Sit/Stand independent   Balance   Balance Comments slight deficits, needed line management   Upper Body Dressing   Level of [place]: Dress Upper Body moderate assist (50% patients effort)   Lower Body Dressing   Level of [place]: Dress Lower Body moderate assist (50% patients effort)   Grooming   Level of [place]: Grooming minimum assist (75% patients effort)   Eating/Self Feeding   Level of [place]: Eating dependent (less than 25% patients effort)   Instrumental Activities of Daily Living (IADL)   Previous Responsibilities meal prep;housekeeping;laundry;shopping;yardwork;medication management;finances;driving;work  (shares IADLs w wife)   Activities of Daily Living Analysis   Impairments Contributing to Impaired Activities of Daily Living balance impaired;pain;post surgical precautions   General Therapy Interventions   Planned Therapy Interventions ADL retraining;IADL retraining;bed mobility training;cognition;ROM;fine motor coordination training   Clinical Impression   Criteria for Skilled Therapeutic Interventions Met yes,  "treatment indicated   OT Diagnosis ENT flap   Influenced by the following impairments post op precautions and pain   Assessment of Occupational Performance 3-5 Performance Deficits   Identified Performance Deficits home management, dressing, grooming   Clinical Decision Making (Complexity) Moderate complexity   Therapy Frequency Daily   Predicted Duration of Therapy Intervention (days/wks) 1 week   Anticipated Discharge Disposition Home with Assist   Risks and Benefits of Treatment have been explained. Yes   Patient, Family & other staff in agreement with plan of care Yes   [facility] \"6 Clicks\"   2016, Trustees of [facility], under license to [employer].  All rights reserved.   6 Clicks Short Forms Daily Activity Inpatient Short Form   [facility]  \"6 Clicks\" Daily Activity Inpatient Short Form   1. Putting on and taking off regular lower body clothing? 3 - A Little   2. Bathing (including washing, rinsing, drying)? 3 - A Little   3. Toileting, which includes using toilet, bedpan or urinal? 3 - A Little   4. Putting on and taking off regular upper body clothing? 2 - A Lot   5. Taking care of personal grooming such as brushing teeth? 2 - A Lot   6. Eating meals? 1 - Total   Daily Activity Raw Score (Score out of 24.Lower scores equate to lower levels of function) 14   Total Evaluation Time   Total Evaluation Time (Minutes) 5     "

## 2020-04-24 NOTE — PROGRESS NOTES
Otolaryngology Progress Note  April 24, 2020    Events: 1 bolus of 250 albumin given for MAPs slighlty below 60 and low urine output, with good response. No issues with flap. He is currently on fentanyl @ 100 and propofol @ 15 with vent settings of 15/480/5/50. Afebrile with stable vital signs.    Subjective: he is bothered by the ETT rubbing against his tooth.    Objective: /73 (BP Location: Left arm)   Pulse 90   Temp 98.3  F (36.8  C) (Axillary)   Resp 17   Ht 1.829 m (6')   Wt 90.3 kg (199 lb 1.2 oz)   SpO2 98%   BMI 27.00 kg/m    General: resting comfortably in SICU bed and not in acute distress, somewhat sweaty  Neuro: able to nod yes/no, appears alert and oriented  HEENT: flap is appropriately pale and warm, strong doppler signal to handheld and continuous doppler, sutures holding with no dehiscence. Tongue midline. Neck is soft with incision on left c/d/i. Drain holding suction, serosanguinous.  Pulm:mechanically ventilated  Msk: Right arm splint and dressing in place, drain and wound vac holding suction. Right thigh dressing partially saturated with serosanguinous output.    Intake/Output Summary (Last 24 hours) at 4/24/2020 0546  Last data filed at 4/24/2020 0500  Gross per 24 hour   Intake 3898.52 ml   Output 1382 ml   Net 2516.52 ml     JOSUE drain output(s): (last 24 hours)/(last shift)  Drain 1 Right Arm: - / 16 / 5  Drain 2 Left neck: - / 19 / 24  Drain 3 Left neck: - / 24 / 28    LABS:  ROUTINE IP LABS (Last four results)  BMP  Recent Labs   Lab 04/24/20  0352 04/23/20 2049 04/23/20  1410 04/23/20  1004    138 136 137   POTASSIUM 4.1 4.3 4.4 4.6   CHLORIDE 107 107  --   --    OLGA LIDIA 9.0 9.7  --   --    CO2 25 26  --   --    BUN 17 17  --   --    CR 1.16 1.20  --   --    * 149* 150* 135*     CBC  Recent Labs   Lab 04/24/20  0352 04/23/20 2049 04/23/20  1410 04/23/20  1004   WBC 7.9 11.3*  --   --    RBC 4.35* 4.63  --   --    HGB 12.4* 13.4 13.7 14.1   HCT 37.8* 40.8  --   --     MCV 87 88  --   --    MCH 28.5 28.9  --   --    MCHC 32.8 32.8  --   --    RDW 12.1 12.1  --   --    * 153  --   --      INRNo lab results found in last 7 days.    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 He underwent wide local excision of left buccal mucosa SCC with left modified radical neck dissection, NG tube placement, reconstruction with a right radial  forearm free flap, and placement of split thickness skin graft to forearm with Otolaryngology-Head and Neck surgery on 4/23.    HEENT:  - Nurse flap checks: q1h x 72 hours --> q4h  - ENT flap checks: q4h flap checks per ENT resident for first 24 hours --> q6h x48 hours (Virtual checks overnight) --> q8h x48 hours --> q12h until discharge.  - Local wound care to neck- rinse with normal saline and apply bacitracin QID x 24 hours followed by aquaphor QID  - Lovenox 40mg every day  -  mg (per g-tube) starting morning of POD#1  - Peridex QID  - Head in neutral position  - NO straps around neck  - NO pillows- only pink pillow behind head  - Please keep room temperature at 72 degrees F  - Red rubber to suction mouth only (do not cut tip)  - Monitor and record JOSUE output q-shift     Neuro:  - Sedation per SICU  - Okay to wean sedation if necessary for low MAPs  - Get up to chair on POD#1     CV:  - Keep MAP > 60; SBP > 90 (preferrably > 110)  - Avoid pressors  - If MAPs drop below goal, please lighten sedation prior to bolus  - PTA losartan, fenofibrate per SICU     Respiratory:  - Wean from ventilator as tolerated  - CXR 4/23:    1. ET tube distal tip projects over the upper thoracic trachea.   2. Unchanged perihilar and bibasilar airspace opacities     GI:  - 4/23 Abdominal XR w/ appropriate NG placement  - NPO  - Last BM PTA  - Begin TF 6 hours after extubation   - begin at 10  ml/hr and advance by 10 ml every 8 hours if tolerating   - hold for any nausea or discomfort  - Nutrition consult for TF  - zofran scheduled for 48 hours  - senna docusate prn     /Renal/Electrolytes:  - DC banegas once no longer sedated  - LR @ 100 (per SICU)  - BMP 4/24 WNL  - Phos 2.9, Mg 2.4     Endocrine:  - POD1 TSH 0.22, albumin 3.3, prealbumin pending  - s/p 2 dose of 10 mg IV decadron, none further required      ID:  - Unasyn x 48 hours  - MRSA negative  - Peridex mouth rinses QID on POD#1    Heme:  - Maintain Hgb > 7   - 13.4 -> 12.4  - Plt 153 -> 138     Extremities:  - Q1 hr CMS checks donor site (left upper extremity)  - Wound vac to stay in place for 5 days. Wound vac to be taken down on day 5 and then daily dressing changes (Xeroform, telfa, kerlix, ace)- will need OT consult for splint     STSG donor site  - Calcium alginate and tegaderm initially. Change as needed. Once drainage stops, cover with tegaderm. If not irritated, can leave open to air and apply aquaphor.     Consults  - PT/OT  - Nutrition    DISPO  - SICU for flap monitoring    -- Patient and above plan discussed with Dr. Garry Bhat MD  Otolaryngology-Head & Neck Surgery  Please contact ENT with questions by dialing [phone] and entering job code [phone] when prompted.

## 2020-04-24 NOTE — ANESTHESIA CARE TRANSFER NOTE
Patient: Norris Reyes    Procedure(s):  wide local excision of left buccal mucosa squamous cell carcinoma  left modified radical neck dissection  right forearm free flap  split thickness skin graft from right thigh  Dental Exam and Surgical Extraction Teeth #13, 14 and 15, NASOGASTRIC TUBE PLACEMENT    Diagnosis: 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) [C06.0]  Diagnosis Additional Information: No value filed.    Anesthesia Type:   General     Note:  Airway :ETT  Patient transferred to:ICU  ICU Handoff: Call for PAUSE to initiate/utilize ICU HANDOFF, Identified Patient, Identified Responsible Provider, Reviewed the Pertinent Medical History, Discussed Surgical Course, Reviewed Intra-OP Anesthesia Management and Issues during Anesthesia, Set Expectations for Post Procedure Period and Allowed Opportunity for Questions and Acknowledgement of Understanding      Vitals: (Last set prior to Anesthesia Care Transfer)    CRNA VITALS  4/23/2020 1837 - 4/23/2020 1907      4/23/2020             Resp Rate (observed):  (!) 6                Electronically Signed By: Jennifer Colbert MD  April 23, 2020  7:07 PM

## 2020-04-24 NOTE — PROGRESS NOTES
SICU STAFF    Developed slow bleed per mouth and obvious L cheek swelling c/w hematoma.  ENT aware and will see patient approx 1800.      Amish Villalpando MD

## 2020-04-24 NOTE — PROGRESS NOTES
SURGICAL ICU PROGRESS NOTE  4/24/2020      Critical Care Services Progress Note:     Norris Reyes remains critically ill with postoperative changes after a lengthy head and neck reconstruction for left buccal mucosa lesion with a T2N2 squamous cell cancer.  Also had a left modified radical neck dissection.  Receiving mechanical ventilation at the time of assessment this morning.  Examined in the course of rounds and extubated from mechanical ventilation.  We will follow his post ventilator course closely.  Continue flap checks.   I personally examined and evaluated the patient today.   The patient s prognosis today is improved in light of successful weaning from mechanical ventilation and extubation.  We need to follow his airway and his flap closely.  I have evaluated all laboratory values and imaging studies from the past 24 hours.  Key findings and decisions made today included improved mental status with good leak around the endotracheal tube along with good lung mechanics supporting extubation.  I personally managed the mechanical ventilation with analgesia and sedation.  We will initiate nutrition.  Consults ongoing and ordered are Pharmacy, Nutrition and Therapies.  Procedures that will happen today are weaning trial using pressure support ventilation with extubation.  Follow-up airway assessment.  Discontinue urinary catheter.  Therapies.  Cautious initiation of tube feeds.    All treatments were placed at my direction.  I formulated today s plan with Dr. Prieto and the house staff team or resident(s) and agree with the findings and plan in the associated note.     The above plans and care have been discussed with the patient at length as he is conscious and able to cooperate and all questions and concerns were addressed.   I spent a total of 45 minutes (excluding procedure time) personally providing and directing critical care services at the bedside and on the critical care unit for Norris RAINES  Eric.        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 He underwent wide local excision of left buccal mucosa SCC with left modified radical neck dissection, NG tube placement, reconstruction with a right radial  forearm free flap, placement of split thickness skin graft to forearm, and dental exam, extraction of tooth #13, 14 and 15 with ENT on 4/23. Admitted to SICU post-op for Q1H flap checks and management of mechanical ventilation.      24-HOUR EVENTS:     Admitted to SICU intubated and sedated    COVID-19 negative    Given 5% albumin 250 mL for borderline MAP's and low urine output with marked improvement    Otherwise, NAEO      DAILY SUMMARY OF PLAN:    Continue Q1H free flap checks by RN and ENT residents    Extubate    Start TF per NG    HSSI for correction    Unasyn for 1 more day    Activity: Up with assist; PT/OT consulted    Discontinue A-line and banegas catheter      PLAN:    Neurologic, Pain Control, and Sedation:  #sedated    Monitor neurologic status. Notify the MD for any acute changes in exam    APAP, Oxycodone and Dilaudid PRN for pain    Discontinue propofol and fentanyl gtt    Pulmonary:   #intubated  Ventilator settings: Ventilation Mode: CMV/AC  (Continuous Mandatory Ventilation/ Assist Control)  FiO2 (%): 30 %  Rate Set (breaths/minute): 15 breaths/min  Tidal Volume Set (mL): 480 mL  PEEP (cm H2O): 5 cmH2O  Oxygen Concentration (%): 50 %  Resp: 16    PS trial then extubate    Supplemental oxygen to maintain saturations above 92%    Incentive spirometry every 15-30 minutes when awake    Cardiovascular:    #HTN    Monitor hemodynamic status    Hold PTA losartan    PRN labetolol, hydralazine    MAP > 60    ENT:  #left buccal mucosa [id number] SCC  #left modified radical neck dissection  #right forearm free  flap,  #split thickness skin graft from right thigh     #extraction of tooth #13, 14 and 15.  q1h flap checks by RN and ENT residents    aquaphor to incision     bacitracin over incision     nothing over mouth or left side of neck    Gastrointestinal:    NG to gravity    Renal, Fluids, Electrolytes, and Nutrition:     LR @ 100 ml/hr    ICU electrolyte replacement protocol    Start TF per NG at 10/h, advance by 10 q8h    Nutrition consulted. Appreciate recs    Urine output has been adequate so far    Discontinue Monzon catheter    Endocrine:      HSSI for correction    Discontinue insulin gtt    Infectious Disease and Immunosuppression:    Unasyn for 1 more day (48 hours perioperative ppx)    COVID-19 negative    Hematology:       Hemoglobin stable; no indication for transfusion    Musculoskeletal:    PT/OT consulted    Prophylaxis:      Mechanical prophylaxis for DVT    Lovenox for DVT ppx    Lines, Tubes, and Drains:  Airway CVC A-line PIV NJ/NG/OG/PEG Drains   ETT --- L radial x2 NG 3xJP, 1xwound vac       Disposition: Surgical ICU    Code Status: Full Code    The patient was seen and discussed with SICU staff, Dr. Villalpando.      Nathan Prieto MD  Anesthesiology Resident, PGY-2      ====================================    SUBJECTIVE:   Awake, c/o R shoulder discomfort and numbness in R index. Passed flatus. Pain well controlled.     OBJECTIVE:   1. VITAL SIGNS:   Temp:  [98  F (36.7  C)-98.3  F (36.8  C)] 98.3  F (36.8  C)  Pulse:  [90-94] 90  Heart Rate:  [60-96] 92  Resp:  [15-18] 16  BP: (131-146)/(71-75) 131/73  MAP:  [58 mmHg-128 mmHg] 128 mmHg  Arterial Line BP: ()/() 146/117  FiO2 (%):  [30 %-50 %] 30 %  SpO2:  [97 %-100 %] 99 %  Ventilation Mode: CMV/AC  (Continuous Mandatory Ventilation/ Assist Control)  FiO2 (%): 30 %  Rate Set (breaths/minute): 15 breaths/min  Tidal Volume Set (mL): 480 mL  PEEP (cm H2O): 5 cmH2O  Oxygen Concentration (%): 50 %  Resp: 16      2. INTAKE AND OUTPUT:   I/O  last 3 completed shifts:  In: 4013.02 [I.V.:1463.02]  Out: 1546 [Urine:1230; Drains:116; Blood:200]    3. PHYSICAL EXAMINATION:   General/Neuro: Intubated and sedated  HEENT: incision c/d/i, 2 JOSUE drains at neck with sanguinous output, flap over left cheeck  Pulm: Mechanically ventilated  Abd: soft; NT, ND  Extremities: no edema, right arm in ACE wrap, JOSUE drain with sanguinous output from right arm, wound vac in place with good seal  Skin: warm and well-perfused.     4. INVESTIGATIONS:   Arterial Blood Gases   Recent Labs   Lab 04/23/20 2049 04/23/20  1410 04/23/20  1004   PH 7.43 7.42 7.46*   PCO2 39 37 36   PO2 102 96 72*   HCO3 25 24 25     Complete Blood Count   Recent Labs   Lab 04/24/20  0352 04/23/20 2049 04/23/20  1410 04/23/20  1004   WBC 7.9 11.3*  --   --    HGB 12.4* 13.4 13.7 14.1   * 153  --   --      Basic Metabolic Panel  Recent Labs   Lab 04/24/20 0352 04/23/20 2049 04/23/20  1410 04/23/20  1004    138 136 137   POTASSIUM 4.1 4.3 4.4 4.6   CHLORIDE 107 107  --   --    CO2 25 26  --   --    BUN 17 17  --   --    CR 1.16 1.20  --   --    * 149* 150* 135*     Liver Function Tests  Recent Labs   Lab 04/24/20  0352   ALBUMIN 3.3*     Pancreatic Enzymes  No lab results found in last 7 days.  Coagulation Profile  No lab results found in last 7 days.  Lactate  Invalid input(s): LACTATE    5. RADIOLOGY:   Recent Results (from the past 24 hour(s))   XR Abdomen Port 1 View    Narrative    Exam: XR ABDOMEN PORT 1 VW, 4/23/2020 7:56 PM    Indication: please evaluate for NG tube placement    Comparison: 4/8/2020 PET/CT    Findings:   Supine frontal view of the abdomen. Enteric tube tip and sidehole  project over the stomach. Air-filled, nondilated loops of small and  large bowel. No pneumatosis. Hyperattenuating focus projecting over  the left 12th rib corresponds to a metallic foreign body in the skin  seen on comparison PET CT. Multilevel degenerative change in the  lumbar spine.       Impression    Impression: Enteric tube tip and sidehole in the stomach.    SUSAN CASTLE, DO   XR Chest Port 1 View    Narrative    Exam: XR CHEST PORT 1 VW, 4/23/2020 9:08 PM    Indication: ET tube placement check    Comparison: 4/8/2020 PET/CT    Findings:   Endotracheal tube tip at the upper thoracic trachea. Surgical drain in  the left neck. Enteric tube courses below the diaphragm and off image  margin. The cardiomediastinal silhouette and pulmonary vasculature are  within normal limits. Streaky perihilar and bibasilar opacities. No  appreciable pleural effusion or pneumothorax.      Impression    Impression:   1. Endotracheal tube tip at the upper thoracic trachea.  2. Left neck surgical drain.  3. Streaky perihilar and medial bibasilar opacities, likely  atelectasis.    SUSAN CASTLE, DO   XR Chest Port 1 View    Narrative    Exam: XR CHEST PORT 1 VW, 4/23/2020 10:32 PM    Indication: follow up after ETT advancement    Comparison: Same day    Findings:   Portable radiograph the chest. ET tube distal tip projects over the  upper thoracic trachea. Enteric tube distal tip is not seen. Surgical  drain in the left neck. Cardiac silhouette is unchanged. Shaky  perihilar opacities unchanged. Partially visualized streaky left  basilar opacities similar to prior although the left lung base is  collimated outside the field of view.      Impression    Impression:   1. ET tube distal tip projects over the upper thoracic trachea.  2. Unchanged perihilar and bibasilar airspace opacities.

## 2020-04-24 NOTE — BRIEF OP NOTE
[facility], [place]    Brief Operative Note    Pre-operative diagnosis: 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) [C06.0]  Post-operative diagnosis Same as pre-operative diagnosis    Procedure: Procedure(s):  wide local excision of left buccal mucosa squamous cell carcinoma  left modified radical neck dissection  right forearm free flap  split thickness skin graft from right thigh  Dental Exam and Surgical Extraction Teeth #13, 14 and 15, NASOGASTRIC TUBE PLACEMENT  Surgeon: Surgeon(s) and Role:  Panel 1:     * Kameron Dior MD - Primary     * Debby Silva MD - Assisting  Panel 2:     * Opal Castañeda MD - Primary  Panel 3:     * Diana Diehl DDS - Primary     * Jose Dove DMD - Resident - Assisting  Anesthesia: General   Estimated blood loss: 200 ml  Drains: Parvez-Juan  Specimens:   ID Type Source Tests Collected by Time Destination   A : Wide Local Excision Buccal Mucosa Tissue Other SURGICAL PATHOLOGY EXAM Kameron Dior MD 4/23/2020  9:49 AM    B : Periosteal Deep Margin Tissue Other SURGICAL PATHOLOGY EXAM Kameron Dior MD 4/23/2020  9:51 AM    C : Anterior Margin Tissue Other SURGICAL PATHOLOGY EXAM Kameron Dior MD 4/23/2020 10:00 AM    D : Anterolateral Margin Tissue Other SURGICAL PATHOLOGY EXAM Kameron Dior MD 4/23/2020 10:10 AM    E : Posteriolateral Margin Tissue Other SURGICAL PATHOLOGY EXAM Kameron Dior MD 4/23/2020 10:10 AM    F : Posterior Margin Tissue Other SURGICAL PATHOLOGY EXAM Kameron Dior MD 4/23/2020 10:10 AM    G : Posterior Medial Margin Tissue Other SURGICAL PATHOLOGY EXAM Kameron Dior MD 4/23/2020 10:11 AM    H : Anterior Medial Margin Tissue Other SURGICAL PATHOLOGY EXAM Kameron Dior MD 4/23/2020 10:11 AM    I : Left Level 1B Tissue Lymph Node SURGICAL PATHOLOGY EXAM Kameron Dior MD 4/23/2020 10:55 AM    J : Left Level 2A Tissue Lymph Node SURGICAL PATHOLOGY EXAM  Kameron Dior MD 2020 11:52 AM    K : Left Level 3 Tissue Lymph Node SURGICAL PATHOLOGY EXAM Kameron Dior MD 2020 11:52 AM    L : Left Level 4 Tissue Lymph Node SURGICAL PATHOLOGY EXAM Kameron Dior MD 2020 11:52 AM    M : Left Level 2B Tissue Lymph Node SURGICAL PATHOLOGY EXAM Kameron Dior MD 2020 12:00 PM    N : New Deep Margin, Marginal Mandibulectomy Tissue Lymph Node SURGICAL PATHOLOGY EXAM Kameron Dior MD 2020 12:29 PM    O : Addition Level 1B Tissue Other SURGICAL PATHOLOGY EXAM Kameron Dior MD 2020  3:52 PM      Findings:   Left buccal cavity cancer, leelee disease in multiple levels of the neck, Defect 9 x 5 cm reconstructed with right radial forearm free flao..  Complications: None.  Implants:   Implant Name Type Inv. Item Serial No.  Lot No. LRB No. Used Action   IMP DEVICE ANASTOMOTIC 3.0MM  GOLD [id number] Other IMP DEVICE ANASTOMOTIC 3.0MM  GOLD [id number]  [employer] [id number] Left 1 Implanted   IMP DEVICE ANASTOMOTIC 3.5MM  PURPLE [id number] Other IMP DEVICE ANASTOMOTIC 3.5MM  PURPLE [id number]  [employer] [id number] Left 1 Implanted   IMP PROBE DOPPLER FLOW STD CUFF [id number] Other IMP PROBE DOPPLER FLOW STD CUFF [id number]  [employer] [id number] Left 1 Implanted       Opal Castañeda MD    Department of Otolaryngology

## 2020-04-24 NOTE — PROGRESS NOTES
ENT Flap Check    Subjective/Events: Denies pain. No issues with flap per RN. Endorses some continued numbness on distal thumb and long finger, but denies pain and can move it easily.    Objective  General: sitting up in chair  Neuro: alert and oriented, speaking easily  HEENT: flap is appropriately pale and warm with hair on cutaneous portion, strong doppler signal to handheld and continuous doppler, sutures holding with small area of dehiscence on posterior-superior corner, old blood expressible from this opening. Neck is soft with incision on left c/d/i. Drains holding suction, serosanguinous.  Pulm: breathing comfortably on RA  Msk: Right arm splint and dressing in place, able to move fingers easily, drain and vac holding suction    Assessment  Stable flap    Plan  - next MD flap check at 10PM    Tray Bhat MD  ENT Resident PGY1

## 2020-04-24 NOTE — PLAN OF CARE
"Problem: Tissue Perfusion Altered  Goal: Improved Tissue Perfusion  Outcome: Declining     D: Pt called RN with \"left cheek swelling and feeling full\". Upon assessment, left cheek severely swollen, firm, and red. Flap pale, swollen, and bleeding noted around flap site.  Changed from 1700 flap check. Vitals remain stable. Patient remains awake and alert.  I: ENT notified. Dilaudid 0.2 mg given. Tube feeds turned off.  A/P: Patient verbalized understanding of need to return to OR. Wife notified. Consent signed. Plan for OR for re-exploration.  "

## 2020-04-24 NOTE — PROGRESS NOTES
ENT FLAP CHECK    S: No issues with nursing flap checks. Received one 250-mL bolus of albumin for low UOP and MAPs in the high 50s. MAPs improved.    O:  /73 (BP Location: Left arm)   Pulse 90   Temp 98  F (36.7  C) (Axillary)   Resp 16   Ht 1.829 m (6')   Wt 90.3 kg (199 lb 1.2 oz)   SpO2 97%   BMI 27.00 kg/m      Gen: Laying in bed, sedated but awake, answering yes/no questions, giving thumbs up  Resp: Normal SaO2 on ventilator. Orotracheal tube taped to right side of face.  Mouth: Left buccal flap is pale and soft. Strong handheld and implantable dopplers. Neck is flat. JOSUE drains x2 holding suction with sanguinous output  MSK: Right arm dressing intact. Fingers are wwp. Sensation intact    A/P: Stable flap.    -Continue plan of care    Juan Pablo Pang MD  Otolaryngology-Head & Neck Surgery PGY3

## 2020-04-25 ENCOUNTER — APPOINTMENT (OUTPATIENT)
Dept: OCCUPATIONAL THERAPY | Facility: CLINIC | Age: 60
DRG: 129 | End: 2020-04-25
Attending: OTOLARYNGOLOGY
Payer: COMMERCIAL

## 2020-04-25 LAB
ANION GAP SERPL CALCULATED.3IONS-SCNC: 5 MMOL/L (ref 3–14)
BACTERIA SPEC CULT: NORMAL
BUN SERPL-MCNC: 21 MG/DL (ref 7–30)
CALCIUM SERPL-MCNC: 8 MG/DL (ref 8.5–10.1)
CHLORIDE SERPL-SCNC: 111 MMOL/L (ref 94–109)
CO2 SERPL-SCNC: 27 MMOL/L (ref 20–32)
CREAT SERPL-MCNC: 1.13 MG/DL (ref 0.66–1.25)
ERYTHROCYTE [DISTWIDTH] IN BLOOD BY AUTOMATED COUNT: 12.5 % (ref 10–15)
GFR SERPL CREATININE-BSD FRML MDRD: 71 ML/MIN/{1.73_M2}
GLUCOSE BLDC GLUCOMTR-MCNC: 105 MG/DL (ref 70–99)
GLUCOSE BLDC GLUCOMTR-MCNC: 116 MG/DL (ref 70–99)
GLUCOSE BLDC GLUCOMTR-MCNC: 118 MG/DL (ref 70–99)
GLUCOSE BLDC GLUCOMTR-MCNC: 119 MG/DL (ref 70–99)
GLUCOSE BLDC GLUCOMTR-MCNC: 128 MG/DL (ref 70–99)
GLUCOSE BLDC GLUCOMTR-MCNC: 135 MG/DL (ref 70–99)
GLUCOSE SERPL-MCNC: 105 MG/DL (ref 70–99)
HCT VFR BLD AUTO: 35 % (ref 40–53)
HGB BLD-MCNC: 11.2 G/DL (ref 13.3–17.7)
Lab: NORMAL
MAGNESIUM SERPL-MCNC: 2.3 MG/DL (ref 1.6–2.3)
MCH RBC QN AUTO: 28.5 PG (ref 26.5–33)
MCHC RBC AUTO-ENTMCNC: 32 G/DL (ref 31.5–36.5)
MCV RBC AUTO: 89 FL (ref 78–100)
PHOSPHATE SERPL-MCNC: 2.6 MG/DL (ref 2.5–4.5)
PLATELET # BLD AUTO: 131 10E9/L (ref 150–450)
POTASSIUM SERPL-SCNC: 3.8 MMOL/L (ref 3.4–5.3)
RBC # BLD AUTO: 3.93 10E12/L (ref 4.4–5.9)
SODIUM SERPL-SCNC: 143 MMOL/L (ref 133–144)
SPECIMEN SOURCE: NORMAL
WBC # BLD AUTO: 6.6 10E9/L (ref 4–11)

## 2020-04-25 PROCEDURE — 80048 BASIC METABOLIC PNL TOTAL CA: CPT | Performed by: OTOLARYNGOLOGY

## 2020-04-25 PROCEDURE — 25000128 H RX IP 250 OP 636: Performed by: STUDENT IN AN ORGANIZED HEALTH CARE EDUCATION/TRAINING PROGRAM

## 2020-04-25 PROCEDURE — 20000004 ZZH R&B ICU UMMC

## 2020-04-25 PROCEDURE — 85027 COMPLETE CBC AUTOMATED: CPT | Performed by: OTOLARYNGOLOGY

## 2020-04-25 PROCEDURE — 25000132 ZZH RX MED GY IP 250 OP 250 PS 637: Performed by: STUDENT IN AN ORGANIZED HEALTH CARE EDUCATION/TRAINING PROGRAM

## 2020-04-25 PROCEDURE — 97530 THERAPEUTIC ACTIVITIES: CPT | Mod: GO | Performed by: OCCUPATIONAL THERAPIST

## 2020-04-25 PROCEDURE — 99231 SBSQ HOSP IP/OBS SF/LOW 25: CPT | Mod: GC | Performed by: SURGERY

## 2020-04-25 PROCEDURE — 40000893 ZZH STATISTIC PT IP EVAL DEFER

## 2020-04-25 PROCEDURE — 25000125 ZZHC RX 250: Performed by: STUDENT IN AN ORGANIZED HEALTH CARE EDUCATION/TRAINING PROGRAM

## 2020-04-25 PROCEDURE — 83735 ASSAY OF MAGNESIUM: CPT | Performed by: OTOLARYNGOLOGY

## 2020-04-25 PROCEDURE — 27210429 ZZH NUTRITION PRODUCT INTERMEDIATE LITER

## 2020-04-25 PROCEDURE — 84100 ASSAY OF PHOSPHORUS: CPT | Performed by: OTOLARYNGOLOGY

## 2020-04-25 PROCEDURE — 00000146 ZZHCL STATISTIC GLUCOSE BY METER IP

## 2020-04-25 RX ORDER — LOSARTAN POTASSIUM 50 MG/1
50 TABLET ORAL DAILY
Status: DISCONTINUED | OUTPATIENT
Start: 2020-04-25 | End: 2020-04-25

## 2020-04-25 RX ORDER — ONDANSETRON 4 MG/1
4 TABLET, ORALLY DISINTEGRATING ORAL EVERY 6 HOURS
Status: COMPLETED | OUTPATIENT
Start: 2020-04-25 | End: 2020-04-27

## 2020-04-25 RX ORDER — ONDANSETRON 2 MG/ML
4 INJECTION INTRAMUSCULAR; INTRAVENOUS EVERY 6 HOURS
Status: COMPLETED | OUTPATIENT
Start: 2020-04-25 | End: 2020-04-27

## 2020-04-25 RX ORDER — SCOLOPAMINE TRANSDERMAL SYSTEM 1 MG/1
1 PATCH, EXTENDED RELEASE TRANSDERMAL
Status: DISCONTINUED | OUTPATIENT
Start: 2020-04-25 | End: 2020-04-26 | Stop reason: CLARIF

## 2020-04-25 RX ORDER — FENOFIBRATE 160 MG/1
160 TABLET ORAL DAILY
Status: DISCONTINUED | OUTPATIENT
Start: 2020-04-25 | End: 2020-04-29 | Stop reason: HOSPADM

## 2020-04-25 RX ORDER — LOSARTAN POTASSIUM 50 MG/1
50 TABLET ORAL EVERY MORNING
Status: DISCONTINUED | OUTPATIENT
Start: 2020-04-25 | End: 2020-04-29 | Stop reason: HOSPADM

## 2020-04-25 RX ADMIN — POTASSIUM CHLORIDE 20 MEQ: 1.5 POWDER, FOR SOLUTION ORAL at 07:49

## 2020-04-25 RX ADMIN — AMPICILLIN SODIUM AND SULBACTAM SODIUM 3 G: 2; 1 INJECTION, POWDER, FOR SOLUTION INTRAMUSCULAR; INTRAVENOUS at 17:55

## 2020-04-25 RX ADMIN — AMPICILLIN SODIUM AND SULBACTAM SODIUM 3 G: 2; 1 INJECTION, POWDER, FOR SOLUTION INTRAMUSCULAR; INTRAVENOUS at 06:04

## 2020-04-25 RX ADMIN — ACETAMINOPHEN 975 MG: 325 TABLET ORAL at 17:55

## 2020-04-25 RX ADMIN — Medication 1 PACKET: at 07:49

## 2020-04-25 RX ADMIN — ASPIRIN 325 MG ORAL TABLET 325 MG: 325 PILL ORAL at 07:49

## 2020-04-25 RX ADMIN — CHLORHEXIDINE GLUCONATE 0.12% ORAL RINSE 15 ML: 1.2 LIQUID ORAL at 11:39

## 2020-04-25 RX ADMIN — LOSARTAN POTASSIUM 50 MG: 50 TABLET, FILM COATED ORAL at 09:44

## 2020-04-25 RX ADMIN — FENOFIBRATE 160 MG: 160 TABLET ORAL at 09:45

## 2020-04-25 RX ADMIN — ACETAMINOPHEN 975 MG: 325 TABLET ORAL at 02:06

## 2020-04-25 RX ADMIN — ENOXAPARIN SODIUM 40 MG: 40 INJECTION SUBCUTANEOUS at 07:49

## 2020-04-25 RX ADMIN — AMPICILLIN SODIUM AND SULBACTAM SODIUM 3 G: 2; 1 INJECTION, POWDER, FOR SOLUTION INTRAMUSCULAR; INTRAVENOUS at 00:00

## 2020-04-25 RX ADMIN — WHITE PETROLATUM: 1.75 OINTMENT TOPICAL at 20:24

## 2020-04-25 RX ADMIN — WHITE PETROLATUM: 1.75 OINTMENT TOPICAL at 11:39

## 2020-04-25 RX ADMIN — CHLORHEXIDINE GLUCONATE 0.12% ORAL RINSE 15 ML: 1.2 LIQUID ORAL at 20:23

## 2020-04-25 RX ADMIN — ACETAMINOPHEN 975 MG: 325 TABLET ORAL at 09:44

## 2020-04-25 RX ADMIN — SENNOSIDES AND DOCUSATE SODIUM 1 TABLET: 8.6; 5 TABLET ORAL at 07:49

## 2020-04-25 RX ADMIN — MULTIVITAMIN 15 ML: LIQUID ORAL at 07:49

## 2020-04-25 RX ADMIN — ONDANSETRON 4 MG: 2 INJECTION INTRAMUSCULAR; INTRAVENOUS at 08:59

## 2020-04-25 RX ADMIN — ONDANSETRON 4 MG: 2 INJECTION INTRAMUSCULAR; INTRAVENOUS at 20:23

## 2020-04-25 RX ADMIN — WHITE PETROLATUM: 1.75 OINTMENT TOPICAL at 04:15

## 2020-04-25 RX ADMIN — ONDANSETRON 4 MG: 2 INJECTION INTRAMUSCULAR; INTRAVENOUS at 15:20

## 2020-04-25 RX ADMIN — AMPICILLIN SODIUM AND SULBACTAM SODIUM 3 G: 2; 1 INJECTION, POWDER, FOR SOLUTION INTRAMUSCULAR; INTRAVENOUS at 11:38

## 2020-04-25 RX ADMIN — SCOPALAMINE 1 PATCH: 1 PATCH, EXTENDED RELEASE TRANSDERMAL at 09:45

## 2020-04-25 ASSESSMENT — ACTIVITIES OF DAILY LIVING (ADL)
ADLS_ACUITY_SCORE: 15

## 2020-04-25 ASSESSMENT — MIFFLIN-ST. JEOR: SCORE: 1783

## 2020-04-25 NOTE — PLAN OF CARE
"FLAP: Slight increase swelling left cheek - ENT assessed. No changes to flap - pink, soft, warm to touch. No bleeding. Manual and implanted doppler signals present. Pt reports tongue is \"less numb\" this evening.   SKIN: Right arm ACE wrap in place, CDI. Left thigh skin graft dressing changed @ 1200, serosang drainage. JPX3 all with minimal output.   N: Alert, oriented X4. Able to make needs known. Moves all extremities to command. Ongoing numbness/tingling right pointer and middle finger. Pain well controlled with scheduled tylenol, declined PRN oxy/dilaudid.   C: NSR, no ectopy noted. SBP <180 without PRN intervention. Home losartan resumed this shift. Pulses palpable, ILANA right arm but cap refill brisk. Afebrile.   R: RA. Lungs clear. Small amount blood tinged thick secretions - self suctions with red joe. Scheduled rinses + 0.9NS as requested.   GI: NGT sutured - tube feeds started this shift. Increase 10cc q8hr to goal 50. Does get nauseated with med pushes - does best when dissolved in warm water and pushed slowly. Scheduled zofran added + scopolamine patch behind right ear. No emeses. PRN senna given this AM - small formed BM.   : Voids per urinal  ACCESS: PIV X2, arterial line   GTTS: TKO + Unasyn  ACTIVITY: Ambulated around unit 3 times SBA/line management, and up in chair most of day. Independently repositions in bed, declines help with turns to side.   SOCIAL: Wife updated by RN, pt personal cell phone also at bedside for communication.     PLAN: Continue to monitor flap q1, notify MD of changes/concerns.   "

## 2020-04-25 NOTE — PLAN OF CARE
PT 4A: Please see PT note from 4/24. Per conversation with RN, no acute status changes from OR 4/24 evening. Acute PT continues not to be indicated. OT is following for mobility/ADLs. Will complete new orders.

## 2020-04-25 NOTE — ANESTHESIA CARE TRANSFER NOTE
Patient: Norris Reyes    Procedure(s):  oral cavity exploration, incision and drainage oral cavity    Diagnosis: Hematoma of neck, initial encounter [S10.93XA]  Diagnosis Additional Information: No value filed.    Anesthesia Type:   General     Note:  Airway :Nasal Cannula  Patient transferred to:ICU  Comments: Vss, sats 96% with nasal cannula, No pain on extubation, No adverse anesthesia events. The patient was transported to the icu and bedside report was given.     ICU Handoff: Call for PAUSE to initiate/utilize ICU HANDOFF, Identified Patient, Identified Responsible Provider, Reviewed the Pertinent Medical History, Discussed Surgical Course, Reviewed Intra-OP Anesthesia Management and Issues during Anesthesia, Set Expectations for Post Procedure Period and Allowed Opportunity for Questions and Acknowledgement of Understanding      Vitals: (Last set prior to Anesthesia Care Transfer)    CRNA VITALS  4/24/2020 2154 - 4/24/2020 2245      4/24/2020             Pulse:  107    ART BP:  127/58    ART Mean:  85    SpO2:  97 %    Resp Rate (observed):  (!) 1                Electronically Signed By: Damari Thornton MD  April 24, 2020  10:45 PM

## 2020-04-25 NOTE — PLAN OF CARE
Neuro: A+O x 4. Neuro intact.  CV: Normal Sinus Rhythm, MAP >65 with left radial artery line present. Palpable pulses w/prompt capillary refill to right upper extremity.  Resp: Lungs clear, infrequent nonproductive cough. Spo2 95% on room air.  GI: Normoactive bowel sounds, denies nausea. NG flushed q4. NPO  : voiding without difficulty per urinal  Skin: flap checks q1h, pale pink, warm, soft. Internal and manual doppler pulses intact. Small amounts of bleeding around oral surgical site. Left cheek hematoma improved with some small facial asymmetry still present. Left neck incision intact/approximated. Right forearm free flap site OSD still in place w/ wound vac -125 sxn w/no measurable output. Right thigh split thickness graft with moderate amounts bloody output. Dressing reinforced with ABD.    Plan: Continue current plan of care.    For vital signs and complete assessments, please see documentation flowsheets.

## 2020-04-25 NOTE — PROGRESS NOTES
Otolaryngology Progress Note  April 25, 2020    Events: Went to OR yesterday evening for hematoma under flap. There was bleeding from the pterygoids, mandible and buccal space. Will restart flap protocol at POD0 and repeat unasyn x48h.    Subjective: Feeling ok this morning. No pain. Willing to get up and walk around today. Per RN he got nauseous with TF inputs.     Objective: BP (!) 146/70 (BP Location: Left leg)   Pulse 90   Temp 98.2  F (36.8  C) (Axillary)   Resp 16   Ht 1.829 m (6')   Wt 92.5 kg (203 lb 14.8 oz)   SpO2 95%   BMI 27.66 kg/m    General: Laying in bed, awake, alert, answering appropriately  Pulm: Nonlabored breathing on room air  HEENT: Flap is pale and soft. No swelling or signs of congestion. JPx2 holding suction, becoming serosaonguinous  Msk: Right arm splint and dressing in place, drain and wound vac holding suction. Right thigh dressing partially saturated with serosanguinous output.    JOSUE drain output(s): (last 24 hours)/(last shift)  Drain 1 Right Arm: 8/27/11 (46)  Drain 2 Left neck: 25/15/18 (58)  Drain 3 Left neck: 28/34/15 (77)    LABS:  ROUTINE IP LABS (Last four results)  BMP  Recent Labs   Lab 04/25/20 0405 04/24/20 2022 04/24/20  0352 04/23/20 2049    140 138 138   POTASSIUM 3.8 3.9 4.1 4.3   CHLORIDE 111*  --  107 107   OLGA LIDIA 8.0*  --  9.0 9.7   CO2 27  --  25 26   BUN 21  --  17 17   CR 1.13  --  1.16 1.20   * 120* 130* 149*     CBC  Recent Labs   Lab 04/25/20 0405 04/24/20 2022 04/24/20  0352 04/23/20 2049   WBC 6.6  --  7.9 11.3*   RBC 3.93*  --  4.35* 4.63   HGB 11.2* 11.4* 12.4* 13.4   HCT 35.0*  --  37.8* 40.8   MCV 89  --  87 88   MCH 28.5  --  28.5 28.9   MCHC 32.0  --  32.8 32.8   RDW 12.5  --  12.1 12.1   *  --  138* 153     ASSESSMENT & PLAN: Norris Reyes is a 59 year old male with a past medical history HTN and autoimmune disease (unspecified in pre-op H/P) admitted for surgical management of a 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 He  underwent wide local excision of left buccal mucosa SCC with left modified radical neck dissection, NG tube placement, reconstruction with a right radial  forearm free flap, and placement of split thickness skin graft to forearm on 4/23 and hematoma evacuation on 4/24.    Neuro:  - Pain per SICU    HEENT:  - Restart flap protocol starting 4/24 pm  - Nurse flap checks: q1h x 72 hours --> q4h  - ENT flap checks: q4h flap checks per ENT resident for first 24 hours --> q6h x48 hours (Virtual checks overnight) --> q8h x48 hours --> q12h until discharge.  - Local wound care to neck- rinse with normal saline and apply bacitracin QID x 24 hours followed by aquaphor QID  - Lovenox 40mg every day  -  mg (per g-tube) starting morning of POD#1  - Peridex QID  - Head in neutral position  - NO straps around neck  - NO pillows- only pink pillow behind head  - Please keep room temperature at 72 degrees F  - Red rubber to suction mouth only (do not cut tip)  - Monitor and record JOSUE output q-shift     CV:  - Keep MAP > 60; SBP > 90 (preferrably > 110)  - Avoid pressors  - If MAPs drop below goal, please lighten sedation prior to bolus  - PTA losartan, fenofibrate per SICU     Respiratory:  - No active issues     GI:  - 4/23 Abdominal XR w/ appropriate NG placement  - NPO  - Last BM PTA  - Resume TF advancement   - begin at 10 ml/hr and advance by 10 ml every 8 hours if tolerating   - hold for any nausea or discomfort  - Nutrition consult for TF  - scopolamine patch and extend scheduled zofran for another 48h  - senna docusate prn     /Renal/Electrolytes:  - LR @ 100 (per SICU)  - Electrolyte replacement protocols    Endocrine:  - POD1 TSH 0.22, albumin 3.3, prealbumin pending  - Received IV decadron after initial procedure for oropharyngeal swelling     ID:  - Repeat Unasyn x 48 hours  - MRSA negative  - Peridex mouth rinses QID on POD#1    Heme:  - Maintain Hgb > 7     Extremities:  - Q1 hr CMS checks donor site (left upper  extremity)  - Wound vac to stay in place for 5 days. Wound vac to be taken down on day 5 and then daily dressing changes (Xeroform, telfa, kerlix, ace)- will need OT consult for splint     STSG donor site  - Calcium alginate and tegaderm initially. Change as needed. Once drainage stops, cover with tegaderm. If not irritated, can leave open to air and apply aquaphor.     Consults  - PT/OT  - Nutrition    DISPO  - SICU for flap monitoring until 4/27 for flap monitoring    -- Patient seen and discussed with Dr. Garry Pang MD  Otolaryngology-Head & Neck Surgery PGY3

## 2020-04-25 NOTE — PROGRESS NOTES
ENT Brief Progress Note    Subjective/Events:  Paged by RN roughly 535 PM re: left cheek swelling and oral bleeding.     Objective  Very large, firm left cheek swelling. Moderate bleeding emanating from posterior-superior dehiscence on flap. Still a strong signal from continuous doppler. Moderate blood suctioned from oral cavity. No stridor or difficulty breathing, but blood present  in posterior oral cavity.    Assessment  Patient has a hematoma under left cheek free flap requiring emergent evacuation.    Plan  - Attending surgeon Dr. Castañeda alerted and case was added on for emergent oral cavity exploration, possible neck exploration, and hematoma evacuation.  - TF held    Tray Bhat MD  Otolaryngology-Head and Neck Surgery PGY1

## 2020-04-25 NOTE — PROGRESS NOTES
Otolaryngology Free Flap Check  April 25, 2020    S: No issues per nursing with the flap, noted minor increase in facial swelling but no changes to flap itself. No oral bleeding. Patient denies any pain or swelling. Has walked the unit three times today.     O: BP (!) 146/70 (BP Location: Left leg)   Pulse 90   Temp 98.1  F (36.7  C) (Axillary)   Resp 14   Ht 1.829 m (6')   Wt 92.5 kg (203 lb 14.8 oz)   SpO2 98%   BMI 27.66 kg/m    General: Resting comfortably in chair.   HEENT: Flap is pale and soft. No swelling or signs of congestion. Strong pulses with handheld and implantable dopplers. JPx2 holding suction, becoming serosaonguinous. Mild facial swelling, soft, no tension or fluctuance. No oral bleeding.  Pulmonary: Breathing comfortably on room air  Extremities: Right arm splint and dressing in place, drain and wound vac holding suction. Right thigh dressing partially saturated with serosanguinous output    A/P: Norris Reyes is a 59 year old male s/p WLE of left buccal mucosa SCC with left MRND, NG tube placement, reconstruction with a right RFFF, and placement of STSG to forearm on 4/23 c/b flap hematoma s/p evacuation on 4/24. Flap is stable.  -- continue current plan of care    Jhon Bee MD  Otolaryngology-Head & Neck Surgery PGY2  To contact ENT please dial [phone] and enter job code [phone].

## 2020-04-25 NOTE — ANESTHESIA POSTPROCEDURE EVALUATION
Anesthesia POST Procedure Evaluation    Patient: Norris Reyes   MRN:     [id number] Gender:   male   Age:    59 year old :      1960        Preoperative Diagnosis: Hematoma of neck, initial encounter [S10.93XA]   Procedure(s):  oral cavity exploration, incision and drainage oral cavity   Postop Comments: No value filed.     Anesthesia Type: General          Postop Pain Control: Uneventful            Sign Out: Well controlled pain   PONV: No   Neuro/Psych: Uneventful            Sign Out: Acceptable/Baseline neuro status   Airway/Respiratory: Uneventful            Sign Out: Acceptable/Baseline resp. status   CV/Hemodynamics: Uneventful            Sign Out: Acceptable CV status   Other NRE: NONE   DID A NON-ROUTINE EVENT OCCUR? No         Last Anesthesia Record Vitals:  CRNA VITALS  2020 2154 - 2020 2240      2020             Pulse:  107    ART BP:  127/58    ART Mean:  85    SpO2:  97 %    Resp Rate (observed):  (!) 1          Last PACU Vitals:  No vitals data found for the desired time range.        Electronically Signed By: Gianni Rivers MD, 2020, 10:40 PM

## 2020-04-25 NOTE — PROGRESS NOTES
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 He underwent wide local excision of left buccal mucosa SCC with left modified radical neck dissection, NG tube placement, reconstruction with a right radial  forearm free flap, placement of split thickness skin graft to forearm, and dental exam, extraction of tooth #13, 14 and 15 with ENT on 4/23. Admitted to SICU post-op for Q1H flap checks and management of mechanical ventilation.      24-HOUR EVENTS:     Taken back to OR for left cheek hematoma drainage; uncomplicated     No other issues      DAILY SUMMARY OF PLAN:    Continue Q1H free flap checks by RN and ENT residents    Start TF per NG    Scheduled  Zofran and scopolamine patch    Resume PTA losartan      PLAN:    Neurologic, Pain Control, and Sedation:  #postop analgesia    Monitor neurologic status. Notify the MD for any acute changes in exam    APAP, Oxycodone and Dilaudid PRN for pain    Pulmonary:     Supplemental oxygen to maintain saturations above 92%    Incentive spirometry every 15-30 minutes when awake    Cardiovascular:    #HTN    Monitor hemodynamic status    Resume PTA losartan    PRN labetolol, hydralazine    MAP > 60    ENT:  #left buccal mucosa [id number] SCC  #left modified radical neck dissection  #right forearm free flap,  #split thickness skin graft from right thigh   #extraction of tooth #13, 14 and 15.  q1h flap checks by RN and ENT residents    aquaphor to incision     bacitracin over incision     nothing over mouth or left side of neck    Gastrointestinal:    NG to gravity    Renal, Fluids, Electrolytes, and Nutrition:     ICU electrolyte replacement protocol    Start TF per NG at 10/h, advance to goal 50 ml/hr    Nutrition consulted. Appreciate recs    Urine output has been adequate so  far    Endocrine:      HSSI for correction    Infectious Disease and Immunosuppression:    Unasyn for 1 more day (72 hours perioperative ppx)    COVID-19 negative    Hematology:       Hemoglobin stable; no indication for transfusion    Musculoskeletal:    PT/OT consulted    Prophylaxis:      Mechanical prophylaxis for DVT    Lovenox for DVT ppx    Lines, Tubes, and Drains:  Airway CVC A-line PIV NJ/NG/OG/PEG Drains   --- --- --- x2 NG 3xJP, 1xwound vac       Disposition: Surgical ICU    Code Status: Full Code    The patient was seen and discussed with SICU staff.      Nathan Prieto MD  Anesthesiology Resident, PGY-2      ====================================    SUBJECTIVE:   Awake, c/o nausea with TF/flushes.     OBJECTIVE:   1. VITAL SIGNS:   Temp:  [98.5  F (36.9  C)-99.3  F (37.4  C)] 98.8  F (37.1  C)  Pulse:  [73-99] 90  Heart Rate:  [] 84  Resp:  [12-18] 18  BP: (123-165)/(66-86) 146/70  MAP:  [67 mmHg-100 mmHg] 89 mmHg  Arterial Line BP: (103-166)/(48-81) 143/61  FiO2 (%):  [30 %] 30 %  SpO2:  [89 %-99 %] 94 %  Ventilation Mode: (S) CPAP/PS  (Continuous positive airway pressure with Pressure Support)  FiO2 (%): 30 %  Rate Set (breaths/minute): 15 breaths/min  Tidal Volume Set (mL): 480 mL  PEEP (cm H2O): 5 cmH2O  Pressure Support (cm H2O): 7 cmH2O  Oxygen Concentration (%): 50 %  Resp: 18      2. INTAKE AND OUTPUT:   I/O last 3 completed shifts:  In: 2269.11 [I.V.:2089.11; NG/GT:150]  Out: 1667 [Urine:1530; Drains:137]    3. PHYSICAL EXAMINATION:   General: in NAD, comfortable, conversant  Neuro: AAOx3, neuro intact on gross examination  HEENT: left cheek swelling significantly reduced  Pulm: breathing comfortably on room air  Abd: soft; NT, ND  Extremities: Right arm splint and dressing in place, able to move fingers easily, drain and vac holding suction, Right thigh skin graft with intact and saturated dressing.     4. INVESTIGATIONS:   Arterial Blood Gases   Recent Labs   Lab 04/24/20 2022  04/23/20 2049 04/23/20  1410 04/23/20  1004   PH 7.43 7.43 7.42 7.46*   PCO2 39 39 37 36   PO2 160* 102 96 72*   HCO3 26 25 24 25     Complete Blood Count   Recent Labs   Lab 04/25/20  0405 04/24/20 2022 04/24/20  0352 04/23/20 2049   WBC 6.6  --  7.9 11.3*   HGB 11.2* 11.4* 12.4* 13.4   *  --  138* 153     Basic Metabolic Panel  Recent Labs   Lab 04/25/20  0405 04/24/20 2022 04/24/20  0352 04/23/20 2049    140 138 138   POTASSIUM 3.8 3.9 4.1 4.3   CHLORIDE 111*  --  107 107   CO2 27  --  25 26   BUN 21  --  17 17   CR 1.13  --  1.16 1.20   * 120* 130* 149*     Liver Function Tests  Recent Labs   Lab 04/24/20 0352   ALBUMIN 3.3*     Pancreatic Enzymes  No lab results found in last 7 days.  Coagulation Profile  No lab results found in last 7 days.  Lactate  Invalid input(s): LACTATE    5. RADIOLOGY:   Recent Results (from the past 24 hour(s))   XR Abdomen Port 1 View    Narrative    Exam: XR ABDOMEN PORT 1 , 4/23/2020 7:56 PM    Indication: please evaluate for NG tube placement    Comparison: 4/8/2020 PET/CT    Findings:   Supine frontal view of the abdomen. Enteric tube tip and sidehole  project over the stomach. Air-filled, nondilated loops of small and  large bowel. No pneumatosis. Hyperattenuating focus projecting over  the left 12th rib corresponds to a metallic foreign body in the skin  seen on comparison PET CT. Multilevel degenerative change in the  lumbar spine.      Impression    Impression: Enteric tube tip and sidehole in the stomach.    SUSAN CASTLE, DO   XR Chest Port 1 View    Narrative    Exam: XR CHEST PORT 1 , 4/23/2020 9:08 PM    Indication: ET tube placement check    Comparison: 4/8/2020 PET/CT    Findings:   Endotracheal tube tip at the upper thoracic trachea. Surgical drain in  the left neck. Enteric tube courses below the diaphragm and off image  margin. The cardiomediastinal silhouette and pulmonary vasculature are  within normal limits. Streaky perihilar and  bibasilar opacities. No  appreciable pleural effusion or pneumothorax.      Impression    Impression:   1. Endotracheal tube tip at the upper thoracic trachea.  2. Left neck surgical drain.  3. Streaky perihilar and medial bibasilar opacities, likely  atelectasis.    SUSAN CASTLE, DO   XR Chest Port 1 View    Narrative    Exam: XR CHEST PORT 1 VW, 4/23/2020 10:32 PM    Indication: follow up after ETT advancement    Comparison: Same day    Findings:   Portable radiograph the chest. ET tube distal tip projects over the  upper thoracic trachea. Enteric tube distal tip is not seen. Surgical  drain in the left neck. Cardiac silhouette is unchanged. Shaky  perihilar opacities unchanged. Partially visualized streaky left  basilar opacities similar to prior although the left lung base is  collimated outside the field of view.      Impression    Impression:   1. ET tube distal tip projects over the upper thoracic trachea.  2. Unchanged perihilar and bibasilar airspace opacities.

## 2020-04-25 NOTE — ANESTHESIA PROCEDURE NOTES
Arterial Line Procedure Note  Staff -   Anesthesiologist:  Gianni Rivers MD  Resident/Fellow: Damari Thornton MD    Performed By: resident  Procedure performed by resident/CRNA in presence of a teaching physician.          Location: In OR After Induction  Procedure Start/Stop Times:     patient identified, IV checked, site marked, risks and benefits discussed, informed consent, monitors and equipment checked, pre-op evaluation and at physician/surgeon's request      Correct Patient: Yes      Correct Position: Yes      Correct Site: Yes      Correct Procedure: Yes      Correct Laterality:  Yes    Site Marked:  Yes  Line Placement:     Procedure:  Arterial Line    Insertion Site:  Radial    Insertion laterality:  Left    Skin Prep: Chloraprep      Patient Prep: mask, sterile gloves, hat and hand hygiene      Local skin infiltration:  None    Ultrasound Guided?: Yes      Artery evaluated via ultrasound confirming patency.   Using realtime imaging, the artery was punctured and the needle was observed entering the artery.      A permanent image is entered into patient's chart.      Catheter size:  20 gauge, 12 cm    Cath secured with: suture      Dressing:  Tegaderm    Complications:  None obvious    Arterial waveform: Yes      IBP within 10% of NIBP: Yes

## 2020-04-25 NOTE — PROGRESS NOTES
Otolaryngology Free Flap Check  April 25, 2020    S: No issues per nursing with the flap. Patient denies any pain or swelling.    O: BP (!) 146/70 (BP Location: Left leg)   Pulse 90   Temp 97.6  F (36.4  C) (Axillary)   Resp 16   Ht 1.829 m (6')   Wt 92.5 kg (203 lb 14.8 oz)   SpO2 96%   BMI 27.66 kg/m    General: Resting comfortably in chair.   HEENT: Flap is pale and soft. No swelling or signs of congestion. JPx2 holding suction, becoming serosaonguinous  Pulmonary: Breathing comfortably on room air  Extremities: Right arm splint and dressing in place, drain and wound vac holding suction. Right thigh dressing partially saturated with serosanguinous output    A/P: Norris Reyes is a 59 year old male s/p WLE of left buccal mucosa SCC with left MRND, NG tube placement, reconstruction with a right RFFF, and placement of STSG to forearm on 4/23 c/b flap hematoma s/p evacuation on 4/24. Flap is stable.  -- continue current plan of care    Jhon Bee MD  Otolaryngology-Head & Neck Surgery PGY2  To contact ENT please dial [phone] and enter job code [phone].

## 2020-04-25 NOTE — OP NOTE
Date of Procedure: 4/24/2020    Attending Physician: Opal Castañeda MD    Procedure Performed:  Washout of hematoma of oral cavity  Awake fiberoptic intubation (nasal)    Preoperative Diagnosis:   1. Oral cavity cancer  2. S/p free flap reconstruction  3. Hematoma with concern for flap compromise    Postoperative Diagnosis: same    Anesthesia: General    Blood loss: 100 cc    Specimens: None    Implants:  As per previous    Complications: None    Findings:  Large hematoma of the left cheek deep to the skin paddle of the free flap  Bleeding identified along the pterygoids, buccal space, mandible, and along the masseter in the flap tunnel  Good arterial signal maintained throughout procedure and with visible pulsations of the artery, no obvious venous clot  Neck flat without obvious hematoma    Indications:  Norris Reyes is a 59 year old man with a 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 He was noted in the evening of 4/24/2020 to have development of left cheek swelling and bleeding consistent with a hematoma. He is indicated for exploration and washout in the OR.    Description of Procedure:  After informed consent was obtained, the patient was brought back to the main operating room and placed in a supine position as an emergency. The patient was placed in a semi upright position due to ongoing bleeding. Due to ongoing bleeding and concern for impending airway compromise, I used the flexible bronchoscope and passed it through the nasal cavity back to the nasopharynx and the larynx was visualized. There was bleeding viewed in the post cricoid space. The cords were sprayed with lidocaine twice. Once this was allowed to take effect and precidex had been administered, the flexible scope was passed through the cords into the trachea. The endotracheal tube was then advanced over the scope  into the airway. End tidal CO2 was verified. The tube was sutured into place. An arterial line was placed by anesthesia. The flap was becoming violaceous in appearance due to significant hematoma so two of the sutures within the oral cavity were cut to release some of the pressure on the flap with immediate expression of blood.    The bed was turned 180 degrees from anesthesia. The patient was prepped and draped in sterile fashion. A time out was performed. The blue cheek retractors were placed. There was significant fullness of the flap and persistent bleeding from under the flap. The neck remained soft and without evidence of hematoma. The lateral and anterior suture line of the free flap inset was released to allow improved access to the hematoma. The area was copiously irrigated with 2 L of saline and the hematoma was carefully removed from around the pedicle. The area under the flap was carefully inspected. There were a few areas of bleeding from along the pterygoids and masseter which was addressed with bipolar cautery. There was continued bleeding from within the defect. There was some bleeding from the marginal mandibulectomy site which was addressed with bone wax. There was continued bleeding superiorly along the buccal fat and near the maxillary tuberosity. This was bipolared. The wound was inspected again and there was bleeding along the masseter near the tunnel. This was addressed carefully with bipolar cautery, avoiding the pedicle. The flap pedicle was inspected and there were a few pinpoint areas of bleeding that were treated with bipolar but nothing of significance. The wound was irrigated again with another liter of saline. Of note, the neck was flat with no obvious bleeding or hematoma. Multiple sustained valsalva maneuvers were performed to assess for bleeding with additional bleeding from the pterygoids and masseter which were cauterized. There was bleeding from along the superior tunnel.  Considerable time was spent assessing the bleeding from the tunnel to control this without injury to the pedicle. The wound was irrigated with a liter of triple antibiotic solution. There was no further obvious bleeding identified. A small piece of surgicel was placed along the tunnel. The flap was then reinset with 3-0 vicryl in horizontal mattress along the buccal mucosa and the gingivobuccal sulcus. Reinforcing sutures were placed along the maxillary tuberosity and maxillary alveolus near the area of the dental extractions. The flap skin paddle was inspected and was significantly improved in appearance. The implantable Doppler signal remained strong throughout the procedure. The stomach was suctioned.    The patient was turned over to anesthesia for extubation. He was transported to the SICU in stable condition with no immediate complications. I was present for and participated in the entire procedure.    Opal Castañeda MD    Department of Otolaryngology

## 2020-04-25 NOTE — PROGRESS NOTES
ENT Flap Check  4/25/2020  In Person 2:30    Subjective/Events: Taken to OR around 1930 for left cheek hematoma drainage. No acute events since patient returned from operating room. Swelling has been significantly reduced. No flap concerns per nursing. JOSUE output appropriate. MAP >65.     Objective  General: Laying in bed, NAD   Neuro: alert and oriented, speaking easily  HEENT: flap is appropriately pale and warm with hair on cutaneous portion. The posterior to mid segment has some ecchymosis that is stable, There is blood tinged secretions but no evidence of active bleeding, strong doppler signal to handheld and continuous doppler, sutures line intact. Neck is soft and flat with incision on left c/d/i. Two JOSUE drains holding suction with serosanguinous fluid. Left cheek that had previously been very swollen now back to near normal with mild asymmetrical enlargement but no fluctuance.   Pulm: breathing comfortably on RA  Msk: Right arm splint and dressing in place, able to move fingers easily, drain and vac holding suction, Right thigh skin graft with intact and saturated dressing.     Drains:  Drain 1: 27  Drain 2: 15  Drain 3: 34     Assessment  Stable flap    Plan  - next MD flap check AM rounds     Jeremiah Bragg, PGY2  Otolaryngology

## 2020-04-25 NOTE — BRIEF OP NOTE
[facility], [place]    Brief Operative Note    Pre-operative diagnosis: Hematoma of neck, initial encounter [S10.93XA]  Post-operative diagnosis Same as pre-operative diagnosis    Procedure: Procedure(s):  EXPLORATION, NECK  INCISION AND DRAINAGE, NECK  Surgeon: Surgeon(s) and Role:     * Opal Castañeda MD - Primary  Anesthesia: General   Estimated blood loss: 100 ml  Drains:  As per previous  Specimens: * No specimens in log *  Findings:   Large hematoma deep to the free flap skin paddle. Bleeding from pterygoids, mandible, buccal space. .  Complications: None.  Implants: * No implants in log *      Opal Castañeda MD    Department of Otolaryngology

## 2020-04-25 NOTE — PLAN OF CARE
Discharge Planner OT   4A    Patient plan for discharge: home w family assist  Current status: Pt feeling very tired today, was back in OR overnight.  Improved balance and activity tolerance with hallway ambulation no AE.  Limited by lines and vitals monitoring.    *Noted R scapular winging in neutral, reports mm pain when abducting     Barriers to return to prior living situation: medical needs  Recommendations for discharge: home w A PRN  Rationale for recommendations: pt with new precautions, will need assist with heavy ADLs and IADLs, anticipate at dc will be IND and safe to dc home w A     Entered by: Gilda Sun 04/25/2020 1:10 PM

## 2020-04-25 NOTE — PLAN OF CARE
PT 4A: Defer PT evaluation. Per discussion with OT patient is mobilizing well post-operatively, only requiring one skilled therapy discipline while inpatient to progress mobility and facilitate safe discharge. OT will continue to follow, PT will sign off. Please re-consult if there is a change in functional status or new needs arise.    Discharge Planner PT   Patient plan for discharge: home with assist from wife  Current status: Mod IND with bed mobility and ambulation.  Barriers to return to prior living situation: medical needs, post-op precautions  Recommendations for discharge: defer to OT  Rationale for recommendations: defer to OT       Entered by: Ernesto Aguero 04/24/2020

## 2020-04-26 ENCOUNTER — APPOINTMENT (OUTPATIENT)
Dept: OCCUPATIONAL THERAPY | Facility: CLINIC | Age: 60
DRG: 129 | End: 2020-04-26
Attending: OTOLARYNGOLOGY
Payer: COMMERCIAL

## 2020-04-26 LAB
ANION GAP SERPL CALCULATED.3IONS-SCNC: 2 MMOL/L (ref 3–14)
BUN SERPL-MCNC: 19 MG/DL (ref 7–30)
CALCIUM SERPL-MCNC: 8.6 MG/DL (ref 8.5–10.1)
CHLORIDE SERPL-SCNC: 111 MMOL/L (ref 94–109)
CO2 SERPL-SCNC: 29 MMOL/L (ref 20–32)
CREAT SERPL-MCNC: 1.07 MG/DL (ref 0.66–1.25)
ERYTHROCYTE [DISTWIDTH] IN BLOOD BY AUTOMATED COUNT: 12.4 % (ref 10–15)
GFR SERPL CREATININE-BSD FRML MDRD: 76 ML/MIN/{1.73_M2}
GLUCOSE BLDC GLUCOMTR-MCNC: 109 MG/DL (ref 70–99)
GLUCOSE BLDC GLUCOMTR-MCNC: 120 MG/DL (ref 70–99)
GLUCOSE BLDC GLUCOMTR-MCNC: 130 MG/DL (ref 70–99)
GLUCOSE BLDC GLUCOMTR-MCNC: 145 MG/DL (ref 70–99)
GLUCOSE BLDC GLUCOMTR-MCNC: 148 MG/DL (ref 70–99)
GLUCOSE BLDC GLUCOMTR-MCNC: 153 MG/DL (ref 70–99)
GLUCOSE SERPL-MCNC: 129 MG/DL (ref 70–99)
HCT VFR BLD AUTO: 34.3 % (ref 40–53)
HGB BLD-MCNC: 11 G/DL (ref 13.3–17.7)
MAGNESIUM SERPL-MCNC: 2.3 MG/DL (ref 1.6–2.3)
MAGNESIUM SERPL-MCNC: 2.5 MG/DL (ref 1.6–2.3)
MCH RBC QN AUTO: 29 PG (ref 26.5–33)
MCHC RBC AUTO-ENTMCNC: 32.1 G/DL (ref 31.5–36.5)
MCV RBC AUTO: 91 FL (ref 78–100)
PHOSPHATE SERPL-MCNC: 2.3 MG/DL (ref 2.5–4.5)
PHOSPHATE SERPL-MCNC: 2.6 MG/DL (ref 2.5–4.5)
PLATELET # BLD AUTO: 123 10E9/L (ref 150–450)
POTASSIUM SERPL-SCNC: 3.9 MMOL/L (ref 3.4–5.3)
POTASSIUM SERPL-SCNC: 4.2 MMOL/L (ref 3.4–5.3)
RBC # BLD AUTO: 3.79 10E12/L (ref 4.4–5.9)
SODIUM SERPL-SCNC: 142 MMOL/L (ref 133–144)
WBC # BLD AUTO: 3.8 10E9/L (ref 4–11)

## 2020-04-26 PROCEDURE — 40000014 ZZH STATISTIC ARTERIAL MONITORING DAILY

## 2020-04-26 PROCEDURE — 27210429 ZZH NUTRITION PRODUCT INTERMEDIATE LITER

## 2020-04-26 PROCEDURE — 97530 THERAPEUTIC ACTIVITIES: CPT | Mod: GO | Performed by: OCCUPATIONAL THERAPIST

## 2020-04-26 PROCEDURE — 25000132 ZZH RX MED GY IP 250 OP 250 PS 637: Performed by: STUDENT IN AN ORGANIZED HEALTH CARE EDUCATION/TRAINING PROGRAM

## 2020-04-26 PROCEDURE — 25000128 H RX IP 250 OP 636: Performed by: STUDENT IN AN ORGANIZED HEALTH CARE EDUCATION/TRAINING PROGRAM

## 2020-04-26 PROCEDURE — 40000275 ZZH STATISTIC RCP TIME EA 10 MIN

## 2020-04-26 PROCEDURE — 25000131 ZZH RX MED GY IP 250 OP 636 PS 637: Performed by: STUDENT IN AN ORGANIZED HEALTH CARE EDUCATION/TRAINING PROGRAM

## 2020-04-26 PROCEDURE — 25000125 ZZHC RX 250: Performed by: STUDENT IN AN ORGANIZED HEALTH CARE EDUCATION/TRAINING PROGRAM

## 2020-04-26 PROCEDURE — 83735 ASSAY OF MAGNESIUM: CPT | Performed by: STUDENT IN AN ORGANIZED HEALTH CARE EDUCATION/TRAINING PROGRAM

## 2020-04-26 PROCEDURE — 85027 COMPLETE CBC AUTOMATED: CPT | Performed by: OTOLARYNGOLOGY

## 2020-04-26 PROCEDURE — 97535 SELF CARE MNGMENT TRAINING: CPT | Mod: GO | Performed by: OCCUPATIONAL THERAPIST

## 2020-04-26 PROCEDURE — 83735 ASSAY OF MAGNESIUM: CPT | Performed by: OTOLARYNGOLOGY

## 2020-04-26 PROCEDURE — 20000004 ZZH R&B ICU UMMC

## 2020-04-26 PROCEDURE — 25800030 ZZH RX IP 258 OP 636: Performed by: STUDENT IN AN ORGANIZED HEALTH CARE EDUCATION/TRAINING PROGRAM

## 2020-04-26 PROCEDURE — 84100 ASSAY OF PHOSPHORUS: CPT | Performed by: STUDENT IN AN ORGANIZED HEALTH CARE EDUCATION/TRAINING PROGRAM

## 2020-04-26 PROCEDURE — 99231 SBSQ HOSP IP/OBS SF/LOW 25: CPT | Mod: GC | Performed by: SURGERY

## 2020-04-26 PROCEDURE — 00000146 ZZHCL STATISTIC GLUCOSE BY METER IP

## 2020-04-26 PROCEDURE — 80048 BASIC METABOLIC PNL TOTAL CA: CPT | Performed by: OTOLARYNGOLOGY

## 2020-04-26 PROCEDURE — 84100 ASSAY OF PHOSPHORUS: CPT | Performed by: OTOLARYNGOLOGY

## 2020-04-26 PROCEDURE — 84132 ASSAY OF SERUM POTASSIUM: CPT | Performed by: STUDENT IN AN ORGANIZED HEALTH CARE EDUCATION/TRAINING PROGRAM

## 2020-04-26 RX ADMIN — Medication 1 PACKET: at 08:04

## 2020-04-26 RX ADMIN — MULTIVITAMIN 15 ML: LIQUID ORAL at 08:02

## 2020-04-26 RX ADMIN — LOSARTAN POTASSIUM 50 MG: 50 TABLET, FILM COATED ORAL at 08:02

## 2020-04-26 RX ADMIN — Medication 10 MEQ: at 09:40

## 2020-04-26 RX ADMIN — INSULIN ASPART 1 UNITS: 100 INJECTION, SOLUTION INTRAVENOUS; SUBCUTANEOUS at 17:54

## 2020-04-26 RX ADMIN — ACETAMINOPHEN 975 MG: 325 TABLET ORAL at 09:42

## 2020-04-26 RX ADMIN — ONDANSETRON 4 MG: 2 INJECTION INTRAMUSCULAR; INTRAVENOUS at 08:20

## 2020-04-26 RX ADMIN — AMPICILLIN SODIUM AND SULBACTAM SODIUM 3 G: 2; 1 INJECTION, POWDER, FOR SOLUTION INTRAMUSCULAR; INTRAVENOUS at 07:07

## 2020-04-26 RX ADMIN — ASPIRIN 325 MG ORAL TABLET 325 MG: 325 PILL ORAL at 08:02

## 2020-04-26 RX ADMIN — AMPICILLIN SODIUM AND SULBACTAM SODIUM 3 G: 2; 1 INJECTION, POWDER, FOR SOLUTION INTRAMUSCULAR; INTRAVENOUS at 17:59

## 2020-04-26 RX ADMIN — WHITE PETROLATUM: 1.75 OINTMENT TOPICAL at 12:49

## 2020-04-26 RX ADMIN — POTASSIUM PHOSPHATE, MONOBASIC AND POTASSIUM PHOSPHATE, DIBASIC 10 MMOL: 224; 236 INJECTION, SOLUTION INTRAVENOUS at 11:08

## 2020-04-26 RX ADMIN — ENOXAPARIN SODIUM 40 MG: 40 INJECTION SUBCUTANEOUS at 08:02

## 2020-04-26 RX ADMIN — ACETAMINOPHEN 975 MG: 325 TABLET ORAL at 02:40

## 2020-04-26 RX ADMIN — AMPICILLIN SODIUM AND SULBACTAM SODIUM 3 G: 2; 1 INJECTION, POWDER, FOR SOLUTION INTRAMUSCULAR; INTRAVENOUS at 13:02

## 2020-04-26 RX ADMIN — ONDANSETRON 4 MG: 2 INJECTION INTRAMUSCULAR; INTRAVENOUS at 15:44

## 2020-04-26 RX ADMIN — AMPICILLIN SODIUM AND SULBACTAM SODIUM 3 G: 2; 1 INJECTION, POWDER, FOR SOLUTION INTRAMUSCULAR; INTRAVENOUS at 00:08

## 2020-04-26 RX ADMIN — ONDANSETRON 4 MG: 2 INJECTION INTRAMUSCULAR; INTRAVENOUS at 20:05

## 2020-04-26 RX ADMIN — CHLORHEXIDINE GLUCONATE 0.12% ORAL RINSE 15 ML: 1.2 LIQUID ORAL at 12:32

## 2020-04-26 RX ADMIN — CHLORHEXIDINE GLUCONATE 0.12% ORAL RINSE 15 ML: 1.2 LIQUID ORAL at 20:05

## 2020-04-26 RX ADMIN — INSULIN ASPART 1 UNITS: 100 INJECTION, SOLUTION INTRAVENOUS; SUBCUTANEOUS at 12:30

## 2020-04-26 RX ADMIN — WHITE PETROLATUM: 1.75 OINTMENT TOPICAL at 04:23

## 2020-04-26 RX ADMIN — Medication 10 MEQ: at 08:19

## 2020-04-26 RX ADMIN — CHLORHEXIDINE GLUCONATE 0.12% ORAL RINSE 15 ML: 1.2 LIQUID ORAL at 04:16

## 2020-04-26 RX ADMIN — ONDANSETRON 4 MG: 2 INJECTION INTRAMUSCULAR; INTRAVENOUS at 02:40

## 2020-04-26 RX ADMIN — FENOFIBRATE 160 MG: 160 TABLET ORAL at 08:03

## 2020-04-26 RX ADMIN — INSULIN ASPART 1 UNITS: 100 INJECTION, SOLUTION INTRAVENOUS; SUBCUTANEOUS at 09:41

## 2020-04-26 RX ADMIN — ACETAMINOPHEN 975 MG: 325 TABLET ORAL at 17:59

## 2020-04-26 RX ADMIN — WHITE PETROLATUM: 1.75 OINTMENT TOPICAL at 20:06

## 2020-04-26 ASSESSMENT — ACTIVITIES OF DAILY LIVING (ADL)
ADLS_ACUITY_SCORE: 16
ADLS_ACUITY_SCORE: 15
ADLS_ACUITY_SCORE: 16
ADLS_ACUITY_SCORE: 15

## 2020-04-26 NOTE — PROGRESS NOTES
Otolaryngology Free Flap Check  April 25, 2020    S: No issues per nursing with the flap. No increased swelling. No oral bleeding. Patient denies any pain or swelling. Overall in good spirits but getting tired of frequent flap checks.     O: BP (!) 146/70 (BP Location: Left leg)   Pulse 90   Temp 98.7  F (37.1  C)   Resp 12   Ht 1.829 m (6')   Wt 92.5 kg (203 lb 14.8 oz)   SpO2 95%   BMI 27.66 kg/m    General: Resting comfortably in chair.   HEENT: Flap is pale and soft. No swelling or signs of congestion. Strong pulses with handheld and implantable dopplers. JPx2 holding suction, becoming serosaonguinous. Stable mild facial swelling, soft, no tension or fluctuance. No oral bleeding.  Pulmonary: Breathing comfortably on room air  Extremities: Right arm splint and dressing in place, drain and wound vac holding suction. Right thigh dressing partially saturated with serosanguinous output    A/P: Norris Reyes is a 59 year old male s/p WLE of left buccal mucosa SCC with left MRND, NG tube placement, reconstruction with a right RFFF, and placement of STSG to forearm on 4/23 c/b flap hematoma s/p evacuation on 4/24. Flap is stable.  -- continue current plan of care    Jhon Bee MD  Otolaryngology-Head & Neck Surgery PGY2  To contact ENT please dial [phone] and enter job code [phone].

## 2020-04-26 NOTE — PROGRESS NOTES
Otolaryngology Progress Note  April 26, 2020    Events: No acute events yesterday    Subjective: Feeling well this morning. Pain well controlled. Got up and walked yesterday. Had questions about drains and sutures. TF at 30 this am.    Objective: BP (!) 146/70 (BP Location: Left leg)   Pulse 90   Temp 98.8  F (37.1  C) (Axillary)   Resp 16   Ht 1.829 m (6')   Wt 92.5 kg (203 lb 14.8 oz)   SpO2 92%   BMI 27.66 kg/m    General: Laying in bed, awake, alert, answering appropriately  Pulm: Nonlabored breathing on room air  HEENT: Flap is pale and soft. No swelling or signs of congestion. Strong implantable and handheld doppler signals. JPx2 holding suction, becoming serosaonguinous  Msk: Right arm splint and dressing in place, drain and wound vac holding suction. Tip of index finger slightly numb, patient says is improving, otherwise fingers wwp. Right thigh dressing partially saturated with serosanguinous output.    JOSUE drain output(s): (last 24 hours)/(last shift)  Drain 1 Right Arm: 22/16/9 (47)  Drain 2 Left neck: 26/10/8 (44)  Drain 3 Left neck: 29/16/12 (57)    LABS:  ROUTINE IP LABS (Last four results)  BMP  Recent Labs   Lab 04/26/20 0422 04/25/20 0405 04/24/20 2022 04/24/20 0352 04/23/20 2049    143 140 138 138   POTASSIUM 3.9 3.8 3.9 4.1 4.3   CHLORIDE 111* 111*  --  107 107   OLGA LIDIA 8.6 8.0*  --  9.0 9.7   CO2 29 27  --  25 26   BUN 19 21  --  17 17   CR 1.07 1.13  --  1.16 1.20   * 105* 120* 130* 149*     CBC  Recent Labs   Lab 04/26/20 0422 04/25/20 0405 04/24/20 2022 04/24/20 0352 04/23/20 2049   WBC 3.8* 6.6  --  7.9 11.3*   RBC 3.79* 3.93*  --  4.35* 4.63   HGB 11.0* 11.2* 11.4* 12.4* 13.4   HCT 34.3* 35.0*  --  37.8* 40.8   MCV 91 89  --  87 88   MCH 29.0 28.5  --  28.5 28.9   MCHC 32.1 32.0  --  32.8 32.8   RDW 12.4 12.5  --  12.1 12.1   * 131*  --  138* 153     ASSESSMENT & PLAN: Norris Reyes is a 59 year old male with a past medical history HTN and autoimmune  disease (unspecified in pre-op H/P) admitted for surgical management of a 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 He underwent wide local excision of left buccal mucosa SCC with left modified radical neck dissection, NG tube placement, reconstruction with a right radial  forearm free flap, and placement of split thickness skin graft to forearm on 4/23 and hematoma evacuation on 4/24.    Neuro:  - Pain per SICU    HEENT:  - Restart flap protocol starting 4/24 pm  - Nurse flap checks: q1h x 72 hours (until 4/27) --> q4h  - ENT flap checks: q6h x48 hours (Virtual checks overnight) --> q8h x48 hours --> q12h until discharge.  - Local wound care to neck- rinse with normal saline and apply bacitracin QID x 24 hours followed by aquaphor QID  - Lovenox 40mg every day  -  mg (per g-tube) starting morning of POD#1  - Peridex QID  - Head in neutral position  - NO straps around neck  - NO pillows- only pink pillow behind head  - Please keep room temperature at 72 degrees F  - Red rubber to suction mouth only (do not cut tip)  - Monitor and record JOSUE output q-shift     CV:  - Keep MAP > 60; SBP > 90 (preferrably > 110)  - Avoid pressors  - If MAPs drop below goal, please lighten sedation prior to bolus  - PTA losartan, fenofibrate per SICU     Respiratory:  - No active issues     GI:  - 4/23 Abdominal XR w/ appropriate NG placement  - NPO  - Last BM PTA  - Continue TF advancement (goal 50mL/h)   - begin at 10 ml/hr and advance by 10 ml every 8 hours if tolerating   - hold for any nausea or discomfort   - Will transition to bolus once tolerating goal for at least 24h  - Nutrition consult for TF  - scopolamine patch and extend scheduled zofran for another 48h  - senna docusate prn     /Renal/Electrolytes:  - Electrolyte replacement protocols    Endocrine:  - POD1 TSH 0.22, albumin 3.3, prealbumin pending  - Received IV decadron after initial procedure for oropharyngeal swelling     ID:  - Repeat Unasyn x 48 hours (end this  evening)  - MRSA negative  - Peridex mouth rinses QID on POD#1    Heme:  - Maintain Hgb > 7     Extremities:  - Q1 hr CMS checks donor site (left upper extremity)  - Wound vac to stay in place for 5 days. Wound vac to be taken down on day 5 and then daily dressing changes (Xeroform, telfa, kerlix, ace)- will need OT consult for splint     STSG donor site  - Calcium alginate and tegaderm initially. Change as needed. Once drainage stops, cover with tegaderm. If not irritated, can leave open to air and apply aquaphor.     Consults  - PT/OT  - Nutrition    DISPO  - SICU until 4/27 for flap monitoring    -- Patient to be discussed with Dr. Garry Pang MD  Otolaryngology-Head & Neck Surgery PGY3

## 2020-04-26 NOTE — PLAN OF CARE
Problem: Tissue Perfusion Altered  Goal: Improved Tissue Perfusion  4/26/2020 1840 by Danielle Posey, RN  Outcome: Improving     Problem: Adult Inpatient Plan of Care  Goal: Readiness for Transition of Care  4/26/2020 1840 by Danielle Posey, RN  Outcome: Improving    Major Shift Events: Alert and oriented x4. Normal Sinus with rare-occasional PAC's. K and Phos replaced. Recheck in range. Systolic BP remained under 180. Art pulled. Room air and equal, clear lung sounds. Tube feeds advanced to goal of 50. No BM. Voiding adequate UOP. Flap checks done q1h with good arterial signal. Walked in bhakta x2.     Plan: Continue to monitor and notify MD of any changes.    For vital signs and complete assessments, please see documentation flowsheets.

## 2020-04-26 NOTE — PLAN OF CARE
Discharge Planner OT   4A    Patient plan for discharge: home w family assist  Current status: Pt feeling bored today.  Improved activity tolerance with hallway ambulation no AE.  Limited by lines and vitals monitoring.    *Noted R scapular winging in neutral, reports mm pain when abducting     Barriers to return to prior living situation: medical needs  Recommendations for discharge: home w A PRN  Rationale for recommendations: pt with new precautions, will need assist with heavy ADLs and IADLs, anticipate at dc will be IND and safe to dc home w A           Entered by: Gilda Sun 04/26/2020 2:43 PM

## 2020-04-26 NOTE — PROGRESS NOTES
SURGICAL ICU PROGRESS NOTE  4/26/2020        ASSESSMENT:   Norris Reyes is a 59 year old male with a past medical history HTN and autoimmune disease (unspecified) admitted for surgical management of a 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 He underwent wide local excision of left buccal mucosa SCC with left modified radical neck dissection, NG tube placement, reconstruction with a right radial  forearm free flap, placement of split thickness skin graft to forearm, and dental exam, extraction of tooth #13, 14 and 15 with ENT on 4/23. Admitted to SICU post-op for Q1H flap checks and management of mechanical ventilation.        DAILY SUMMARY OF PLAN:    Continue Q1H free flap checks by RN and ENT residents    Advance TF as tolerated    Unasyn 3 g Q6H for 4 more doses       PLAN:    Neurologic, Pain Control, and Sedation:  #postop analgesia    Monitor neurologic status. Notify the MD for any acute changes in exam    APAP, Oxycodone and Dilaudid PRN for pain    Pulmonary:     Supplemental oxygen to maintain saturations above 92%    Incentive spirometry every 15-30 minutes when awake    Cardiovascular:    #HTN    Monitor hemodynamic status    PTA losartan    PRN labetolol, hydralazine    MAP > 60    ENT:  #left buccal mucosa [id number] SCC  #left modified radical neck dissection  #right forearm free flap,  #split thickness skin graft from right thigh   #extraction of tooth #13, 14 and 15.    q1h flap checks by RN and ENT residents    aquaphor to incision     bacitracin over incision     nothing over mouth or left side of neck    Gastrointestinal:    NG to gravity    Renal, Fluids, Electrolytes, and Nutrition:     ICU electrolyte replacement protocol    Advance TF as tolerated; goal 50 ml/hr    Nutrition consulted. Appreciate recs    Urine output has been adequate so far    Endocrine:      HSSI for correction    Infectious Disease and Immunosuppression:    Complete Unasyn perioperative ppx; last dose today    COVID-19  negative    Hematology:       Hemoglobin stable; no indication for transfusion    Musculoskeletal:    PT/OT consulted    Prophylaxis:      Mechanical prophylaxis for DVT    Lovenox for DVT ppx    Lines, Tubes, and Drains:  Airway CVC A-line PIV NJ/NG/OG/PEG Drains   --- --- --- x2 NG 3xJP, 1xwound vac       Disposition: Surgical ICU    Code Status: Full Code    The patient was seen and discussed with SICU staff.      Nathan Prieto MD  Anesthesiology Resident, PGY-2      ====================================    SUBJECTIVE:   Awake, comfortable    OBJECTIVE:   1. VITAL SIGNS:   Temp:  [97.6  F (36.4  C)-98.7  F (37.1  C)] 98  F (36.7  C)  Heart Rate:  [66-92] 68  Resp:  [12-16] 16  MAP:  [66 mmHg-110 mmHg] 85 mmHg  Arterial Line BP: (106-164)/(45-87) 135/67  SpO2:  [89 %-98 %] 95 %  Resp: 16      2. INTAKE AND OUTPUT:   I/O last 3 completed shifts:  In: 1284 [I.V.:474; NG/GT:480]  Out: 2298 [Urine:2150; Drains:148]    3. PHYSICAL EXAMINATION:   General: in NAD, comfortable, conversant  Neuro: AAOx3, neuro intact on gross examination  HEENT: Flap is pale. No swelling or signs of congestion. Strong pulses with implantable dopplers.   Pulm: Breathing comfortably on room air  Abd: Soft; NT, ND  Extremities: Right arm splint and dressing in place, able to move fingers easily, drain and vac holding suction, Right thigh skin graft with intact and saturated dressing.     4. INVESTIGATIONS:   Arterial Blood Gases   Recent Labs   Lab 04/24/20 2022 04/23/20 2049 04/23/20  1410 04/23/20  1004   PH 7.43 7.43 7.42 7.46*   PCO2 39 39 37 36   PO2 160* 102 96 72*   HCO3 26 25 24 25     Complete Blood Count   Recent Labs   Lab 04/26/20  0422 04/25/20  0405 04/24/20 2022 04/24/20  0352 04/23/20 2049   WBC 3.8* 6.6  --  7.9 11.3*   HGB 11.0* 11.2* 11.4* 12.4* 13.4   * 131*  --  138* 153     Basic Metabolic Panel  Recent Labs   Lab 04/26/20  0422 04/25/20  0405 04/24/20 2022 04/24/20 0352 04/23/20 2049    143 140  138 138   POTASSIUM 3.9 3.8 3.9 4.1 4.3   CHLORIDE 111* 111*  --  107 107   CO2 29 27  --  25 26   BUN 19 21  --  17 17   CR 1.07 1.13  --  1.16 1.20   * 105* 120* 130* 149*     Liver Function Tests  Recent Labs   Lab 04/24/20  0352   ALBUMIN 3.3*     Pancreatic Enzymes  No lab results found in last 7 days.  Coagulation Profile  No lab results found in last 7 days.  Lactate  Invalid input(s): LACTATE    5. RADIOLOGY:   Recent Results (from the past 24 hour(s))   XR Abdomen Port 1 View    Narrative    Exam: XR ABDOMEN PORT 1 VW, 4/23/2020 7:56 PM    Indication: please evaluate for NG tube placement    Comparison: 4/8/2020 PET/CT    Findings:   Supine frontal view of the abdomen. Enteric tube tip and sidehole  project over the stomach. Air-filled, nondilated loops of small and  large bowel. No pneumatosis. Hyperattenuating focus projecting over  the left 12th rib corresponds to a metallic foreign body in the skin  seen on comparison PET CT. Multilevel degenerative change in the  lumbar spine.      Impression    Impression: Enteric tube tip and sidehole in the stomach.    SUSAN CASTLE, DO   XR Chest Port 1 View    Narrative    Exam: XR CHEST PORT 1 VW, 4/23/2020 9:08 PM    Indication: ET tube placement check    Comparison: 4/8/2020 PET/CT    Findings:   Endotracheal tube tip at the upper thoracic trachea. Surgical drain in  the left neck. Enteric tube courses below the diaphragm and off image  margin. The cardiomediastinal silhouette and pulmonary vasculature are  within normal limits. Streaky perihilar and bibasilar opacities. No  appreciable pleural effusion or pneumothorax.      Impression    Impression:   1. Endotracheal tube tip at the upper thoracic trachea.  2. Left neck surgical drain.  3. Streaky perihilar and medial bibasilar opacities, likely  atelectasis.    SUSNA CASTLE DO   XR Chest Port 1 View    Narrative    Exam: XR CHEST PORT 1 VW, 4/23/2020 10:32 PM    Indication: follow up after ETT  advancement    Comparison: Same day    Findings:   Portable radiograph the chest. ET tube distal tip projects over the  upper thoracic trachea. Enteric tube distal tip is not seen. Surgical  drain in the left neck. Cardiac silhouette is unchanged. Shaky  perihilar opacities unchanged. Partially visualized streaky left  basilar opacities similar to prior although the left lung base is  collimated outside the field of view.      Impression    Impression:   1. ET tube distal tip projects over the upper thoracic trachea.  2. Unchanged perihilar and bibasilar airspace opacities.

## 2020-04-26 NOTE — PROGRESS NOTES
ENT FLAP CHECK    S: No issues with nursing flap checks. MAPs>60 without intervention. Tolerating tube feeds without fullness or nausea. Has walked x2 today. Patient is bored of being in hospital.     O:  /68   Pulse 73   Temp 97.7  F (36.5  C) (Axillary)   Resp 16   Ht 1.829 m (6')   Wt 92.5 kg (203 lb 14.8 oz)   SpO2 95%   BMI 27.66 kg/m      Gen: Up in chair, awake and alert  Resp: Nonlabored breathing on room air  Mouth: Left buccal flap is pale and soft. Strong handheld and implantable dopplers. Stable cheek swelling. Neck is flat. OJSUE drains x2 holding suction with sanguinous output  MSK: Right arm dressing intact. Fingers are wwp.    A/P: Stable flap.    -Continue plan of care    Juan Pablo Pang MD  Otolaryngology-Head & Neck Surgery PGY3

## 2020-04-26 NOTE — PROGRESS NOTES
Otolaryngology Virtual Free Flap Check  April 26, 2020    S: No issues per nursing with the flap.     O: BP (!) 146/70 (BP Location: Left leg)   Pulse 90   Temp 98.6  F (37  C) (Axillary)   Resp 16   Ht 1.829 m (6')   Wt 92.5 kg (203 lb 14.8 oz)   SpO2 94%   BMI 27.66 kg/m       Flap is pale and soft. No swelling or signs of congestion. Strong pulses with handheld and implantable dopplers.     A/P: Norris Reyes is a 59 year old male s/p WLE of left buccal mucosa SCC with left MRND, NG tube placement, reconstruction with a right RFFF, and placement of STSG to forearm on 4/23 c/b flap hematoma s/p evacuation on 4/24. Flap is stable.  -- continue current plan of care    Jhon Bee MD  Otolaryngology-Head & Neck Surgery PGY2  To contact ENT please dial [phone] and enter job code [phone].

## 2020-04-27 ENCOUNTER — ANESTHESIA (OUTPATIENT)
Dept: SURGERY | Facility: CLINIC | Age: 60
DRG: 129 | End: 2020-04-27
Payer: COMMERCIAL

## 2020-04-27 ENCOUNTER — APPOINTMENT (OUTPATIENT)
Dept: OCCUPATIONAL THERAPY | Facility: CLINIC | Age: 60
DRG: 129 | End: 2020-04-27
Attending: OTOLARYNGOLOGY
Payer: COMMERCIAL

## 2020-04-27 LAB
ABO + RH BLD: NORMAL
ABO + RH BLD: NORMAL
ANION GAP SERPL CALCULATED.3IONS-SCNC: 1 MMOL/L (ref 3–14)
BASOPHILS # BLD AUTO: 0 10E9/L (ref 0–0.2)
BASOPHILS NFR BLD AUTO: 0 %
BLD GP AB SCN SERPL QL: NORMAL
BLD PROD TYP BPU: NORMAL
BLD PROD TYP BPU: NORMAL
BLD UNIT ID BPU: 0
BLD UNIT ID BPU: 0
BLOOD BANK CMNT PATIENT-IMP: NORMAL
BLOOD PRODUCT CODE: NORMAL
BLOOD PRODUCT CODE: NORMAL
BPU ID: NORMAL
BPU ID: NORMAL
BUN SERPL-MCNC: 24 MG/DL (ref 7–30)
CALCIUM SERPL-MCNC: 9.1 MG/DL (ref 8.5–10.1)
CHLORIDE SERPL-SCNC: 112 MMOL/L (ref 94–109)
CO2 SERPL-SCNC: 31 MMOL/L (ref 20–32)
CREAT SERPL-MCNC: 1.06 MG/DL (ref 0.66–1.25)
DIFFERENTIAL METHOD BLD: ABNORMAL
EOSINOPHIL # BLD AUTO: 0 10E9/L (ref 0–0.7)
EOSINOPHIL NFR BLD AUTO: 1 %
ERYTHROCYTE [DISTWIDTH] IN BLOOD BY AUTOMATED COUNT: 12.1 % (ref 10–15)
GFR SERPL CREATININE-BSD FRML MDRD: 77 ML/MIN/{1.73_M2}
GLUCOSE BLDC GLUCOMTR-MCNC: 133 MG/DL (ref 70–99)
GLUCOSE BLDC GLUCOMTR-MCNC: 140 MG/DL (ref 70–99)
GLUCOSE BLDC GLUCOMTR-MCNC: 148 MG/DL (ref 70–99)
GLUCOSE BLDC GLUCOMTR-MCNC: 148 MG/DL (ref 70–99)
GLUCOSE BLDC GLUCOMTR-MCNC: 156 MG/DL (ref 70–99)
GLUCOSE SERPL-MCNC: 148 MG/DL (ref 70–99)
HCT VFR BLD AUTO: 34.5 % (ref 40–53)
HGB BLD-MCNC: 10.9 G/DL (ref 13.3–17.7)
LYMPHOCYTES # BLD AUTO: 0.3 10E9/L (ref 0.8–5.3)
LYMPHOCYTES NFR BLD AUTO: 10 %
MAGNESIUM SERPL-MCNC: 2.5 MG/DL (ref 1.6–2.3)
MCH RBC QN AUTO: 28.8 PG (ref 26.5–33)
MCHC RBC AUTO-ENTMCNC: 31.6 G/DL (ref 31.5–36.5)
MCV RBC AUTO: 91 FL (ref 78–100)
MONOCYTES # BLD AUTO: 0.3 10E9/L (ref 0–1.3)
MONOCYTES NFR BLD AUTO: 10 %
NEUTROPHILS # BLD AUTO: 2.6 10E9/L (ref 1.6–8.3)
NEUTROPHILS NFR BLD AUTO: 79 %
PHOSPHATE SERPL-MCNC: 2.5 MG/DL (ref 2.5–4.5)
PLATELET # BLD AUTO: 135 10E9/L (ref 150–450)
POTASSIUM SERPL-SCNC: 4.1 MMOL/L (ref 3.4–5.3)
RBC # BLD AUTO: 3.78 10E12/L (ref 4.4–5.9)
SODIUM SERPL-SCNC: 144 MMOL/L (ref 133–144)
SPECIMEN EXP DATE BLD: NORMAL
TRANSFUSION STATUS PATIENT QL: NORMAL
WBC # BLD AUTO: 3.3 10E9/L (ref 4–11)

## 2020-04-27 PROCEDURE — 25000132 ZZH RX MED GY IP 250 OP 250 PS 637: Performed by: STUDENT IN AN ORGANIZED HEALTH CARE EDUCATION/TRAINING PROGRAM

## 2020-04-27 PROCEDURE — 25000128 H RX IP 250 OP 636: Performed by: STUDENT IN AN ORGANIZED HEALTH CARE EDUCATION/TRAINING PROGRAM

## 2020-04-27 PROCEDURE — 83735 ASSAY OF MAGNESIUM: CPT | Performed by: PHYSICIAN ASSISTANT

## 2020-04-27 PROCEDURE — 86901 BLOOD TYPING SEROLOGIC RH(D): CPT | Performed by: STUDENT IN AN ORGANIZED HEALTH CARE EDUCATION/TRAINING PROGRAM

## 2020-04-27 PROCEDURE — 83735 ASSAY OF MAGNESIUM: CPT | Performed by: OTOLARYNGOLOGY

## 2020-04-27 PROCEDURE — 84100 ASSAY OF PHOSPHORUS: CPT | Performed by: PHYSICIAN ASSISTANT

## 2020-04-27 PROCEDURE — 85027 COMPLETE CBC AUTOMATED: CPT | Performed by: OTOLARYNGOLOGY

## 2020-04-27 PROCEDURE — 80048 BASIC METABOLIC PNL TOTAL CA: CPT | Performed by: OTOLARYNGOLOGY

## 2020-04-27 PROCEDURE — 85004 AUTOMATED DIFF WBC COUNT: CPT | Performed by: OTOLARYNGOLOGY

## 2020-04-27 PROCEDURE — 12000001 ZZH R&B MED SURG/OB UMMC

## 2020-04-27 PROCEDURE — 00000146 ZZHCL STATISTIC GLUCOSE BY METER IP

## 2020-04-27 PROCEDURE — 25000132 ZZH RX MED GY IP 250 OP 250 PS 637: Performed by: NURSE PRACTITIONER

## 2020-04-27 PROCEDURE — 84100 ASSAY OF PHOSPHORUS: CPT | Performed by: OTOLARYNGOLOGY

## 2020-04-27 PROCEDURE — 86850 RBC ANTIBODY SCREEN: CPT | Performed by: STUDENT IN AN ORGANIZED HEALTH CARE EDUCATION/TRAINING PROGRAM

## 2020-04-27 PROCEDURE — 27210429 ZZH NUTRITION PRODUCT INTERMEDIATE LITER

## 2020-04-27 PROCEDURE — 85004 AUTOMATED DIFF WBC COUNT: CPT | Performed by: NURSE PRACTITIONER

## 2020-04-27 PROCEDURE — 97535 SELF CARE MNGMENT TRAINING: CPT | Mod: GO | Performed by: OCCUPATIONAL THERAPIST

## 2020-04-27 PROCEDURE — 86900 BLOOD TYPING SEROLOGIC ABO: CPT | Performed by: STUDENT IN AN ORGANIZED HEALTH CARE EDUCATION/TRAINING PROGRAM

## 2020-04-27 RX ORDER — CHLORHEXIDINE GLUCONATE ORAL RINSE 1.2 MG/ML
15 SOLUTION DENTAL 4 TIMES DAILY
Status: DISCONTINUED | OUTPATIENT
Start: 2020-04-27 | End: 2020-04-29 | Stop reason: HOSPADM

## 2020-04-27 RX ORDER — POLYETHYLENE GLYCOL 3350 17 G/17G
17 POWDER, FOR SOLUTION ORAL DAILY PRN
Status: DISCONTINUED | OUTPATIENT
Start: 2020-04-27 | End: 2020-04-29 | Stop reason: HOSPADM

## 2020-04-27 RX ORDER — AMOXICILLIN 250 MG
1 CAPSULE ORAL 2 TIMES DAILY
Status: DISCONTINUED | OUTPATIENT
Start: 2020-04-27 | End: 2020-04-29 | Stop reason: HOSPADM

## 2020-04-27 RX ADMIN — POLYETHYLENE GLYCOL 3350 17 G: 17 POWDER, FOR SOLUTION ORAL at 14:40

## 2020-04-27 RX ADMIN — WHITE PETROLATUM: 1.75 OINTMENT TOPICAL at 11:56

## 2020-04-27 RX ADMIN — CHLORHEXIDINE GLUCONATE 0.12% ORAL RINSE 15 ML: 1.2 LIQUID ORAL at 11:56

## 2020-04-27 RX ADMIN — INSULIN ASPART 1 UNITS: 100 INJECTION, SOLUTION INTRAVENOUS; SUBCUTANEOUS at 12:02

## 2020-04-27 RX ADMIN — Medication 1 PACKET: at 07:37

## 2020-04-27 RX ADMIN — SENNOSIDES AND DOCUSATE SODIUM 1 TABLET: 8.6; 5 TABLET ORAL at 20:27

## 2020-04-27 RX ADMIN — ENOXAPARIN SODIUM 40 MG: 40 INJECTION SUBCUTANEOUS at 07:37

## 2020-04-27 RX ADMIN — LOSARTAN POTASSIUM 50 MG: 50 TABLET, FILM COATED ORAL at 07:37

## 2020-04-27 RX ADMIN — INSULIN ASPART 1 UNITS: 100 INJECTION, SOLUTION INTRAVENOUS; SUBCUTANEOUS at 07:41

## 2020-04-27 RX ADMIN — ACETAMINOPHEN 975 MG: 325 TABLET ORAL at 17:57

## 2020-04-27 RX ADMIN — ACETAMINOPHEN 975 MG: 325 TABLET ORAL at 01:48

## 2020-04-27 RX ADMIN — ACETAMINOPHEN 975 MG: 325 TABLET ORAL at 09:58

## 2020-04-27 RX ADMIN — CHLORHEXIDINE GLUCONATE 0.12% ORAL RINSE 15 ML: 1.2 LIQUID ORAL at 04:08

## 2020-04-27 RX ADMIN — MULTIVITAMIN 15 ML: LIQUID ORAL at 07:37

## 2020-04-27 RX ADMIN — FENOFIBRATE 160 MG: 160 TABLET ORAL at 07:37

## 2020-04-27 RX ADMIN — CHLORHEXIDINE GLUCONATE 0.12% ORAL RINSE 15 ML: 1.2 LIQUID ORAL at 07:48

## 2020-04-27 RX ADMIN — WHITE PETROLATUM: 1.75 OINTMENT TOPICAL at 04:09

## 2020-04-27 RX ADMIN — ONDANSETRON 4 MG: 2 INJECTION INTRAMUSCULAR; INTRAVENOUS at 04:08

## 2020-04-27 RX ADMIN — WHITE PETROLATUM: 1.75 OINTMENT TOPICAL at 20:27

## 2020-04-27 RX ADMIN — CHLORHEXIDINE GLUCONATE 0.12% ORAL RINSE 15 ML: 1.2 LIQUID ORAL at 20:24

## 2020-04-27 RX ADMIN — ASPIRIN 325 MG ORAL TABLET 325 MG: 325 PILL ORAL at 07:37

## 2020-04-27 RX ADMIN — INSULIN ASPART 1 UNITS: 100 INJECTION, SOLUTION INTRAVENOUS; SUBCUTANEOUS at 20:30

## 2020-04-27 RX ADMIN — INSULIN ASPART 1 UNITS: 100 INJECTION, SOLUTION INTRAVENOUS; SUBCUTANEOUS at 15:59

## 2020-04-27 RX ADMIN — SENNOSIDES AND DOCUSATE SODIUM 1 TABLET: 8.6; 5 TABLET ORAL at 09:58

## 2020-04-27 ASSESSMENT — ACTIVITIES OF DAILY LIVING (ADL)
ADLS_ACUITY_SCORE: 13
ADLS_ACUITY_SCORE: 15

## 2020-04-27 ASSESSMENT — VISUAL ACUITY: OU: NORMAL ACUITY

## 2020-04-27 ASSESSMENT — MIFFLIN-ST. JEOR: SCORE: 1786

## 2020-04-27 NOTE — PLAN OF CARE
Neuro: WNL, slight tingling right hand index, middle finger unchanged.  -CV: Normal Sinus Rhythm, VSS  -RESP: Lungs clear; room air  -GI: Abdomen soft/nontender; passing flatus; BM 4/25; NG w/Tube Feed at 50ml/hr goal rate; patient denies nausea  -: voiding appropriately via urinal  -SKIN: Flap checks Q1h=pale pink, soft, warm w/internal + manual doppler pulses intact. L. Neck incision WNL w/Aquaphor Q8 + JOSUE x 2. RFA flap site w/original surgical dressing intact +JOSUE x 1. R. Thigh split thickness graft Ca Alginate dressing site w/small serosanguinous drainage.    Plan: Continue current plan of care.  For vital signs and complete assessments, please see documentation flowsheets.

## 2020-04-27 NOTE — PROGRESS NOTES
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 He underwent wide local excision of left buccal mucosa SCC with left modified radical neck dissection, NG tube placement, reconstruction with a right radial  forearm free flap, placement of split thickness skin graft to forearm, and dental exam, extraction of tooth #13, 14 and 15 with ENT on 4/23. Admitted to SICU post-op for Q1H flap checks and management of mechanical ventilation.    TODAY'S PROGRESS:   - Continued flap checks per ENT  - Transfer to floor when ENT ready    PLAN:    Neurologic, Pain Control, and Sedation:  #postop analgesia    Monitor neurologic status. Notify the MD for any acute changes in exam    APAP, Oxycodone and Dilaudid PRN for pain     Pulmonary:     Supplemental oxygen to maintain saturations above 92%    Incentive spirometry every 15-30 minutes when awake     Cardiovascular:    #HTN    Monitor hemodynamic status    PTA losartan    PRN labetolol, hydralazine    MAP > 60     ENT:  #left buccal mucosa [id number] SCC  #left modified radical neck dissection  #right forearm free flap,  #split thickness skin graft from right thigh   #extraction of tooth #13, 14 and 15.    q1h flap checks by RN and ENT residents    aquaphor to incision     bacitracin over incision     nothing over mouth or left side of neck     Gastrointestinal:    NG in place with TF     Renal, Fluids, Electrolytes, and Nutrition:     ICU electrolyte replacement protocol    TF as tolerated; goal 50 ml/hr    Nutrition consulted. Appreciate recs    Urine output has been adequate so far     Endocrine:      HSSI for correction     Infectious Disease and Immunosuppression:    Perioperative ppx Unasyn completed 4/26    COVID-19 negative     Hematology:     #Leukopenia   WBC in the 3s for two  days. Abx stopped last night. No other concerns for infection  - Monitor CBC daily   - Consider peripheral smear/heme consult if continues         Hemoglobin stable; no indication for transfusion     Musculoskeletal:    PT/OT consulted     Prophylaxis:      Mechanical, lovenox prophylaxis for DVT    Lines/ tubes/ drains:  - PIVs, NG, JPs, wound vac    Disposition:  - SICU    Patient seen, findings and plan discussed with surgical ICU staff, Dr Courtney Castorena NP    ====================================    SUBJECTIVE:   Norris had an unremarkable overnight. This morning he denies any pain at incision/drain sites, chest pain. Denies breathing difficulties or shortness of breath. Denies nausea. States that he is ready for a cup of black coffee.   Finishing hourly flap checks today, per ENT. Will transfer to floor when appropriate and ENT ready.     OBJECTIVE:   1. VITAL SIGNS:   Temp:  [97.3  F (36.3  C)-98.8  F (37.1  C)] 97.8  F (36.6  C)  Pulse:  [58-73] 62  Heart Rate:  [60-82] 61  Resp:  [14-16] 16  BP: (123-152)/(68-82) 146/81  MAP:  [98 mmHg-120 mmHg] 120 mmHg  Arterial Line BP: (146-182)/(72-87) 182/87  SpO2:  [93 %-97 %] 94 %  Resp: 16      2. INTAKE/ OUTPUT:   I/O last 3 completed shifts:  In: 1951 [I.V.:671; NG/GT:240]  Out: 2796 [Urine:2725; Drains:71]    3. PHYSICAL EXAMINATION:   General: in NAD, comfortable, conversant  Neuro: AAOx3, neuro intact on gross examination  HEENT: Flap is pale. No swelling or signs of congestion. Strong pulses with implantable dopplers.   Pulm: Breathing comfortably on room air, lung sounds clear bilaterally  CV: RRR, S1S2, no lower extremity edema, peripheral pulses intact  Abd: Soft; NT, ND  Extremities: Right arm splint and dressing in place, able to move fingers easily, drain and vac holding suction, Right thigh skin graft with intact and saturated dressing.     4. INVESTIGATIONS:   Arterial Blood Gases   Recent Labs   Lab 04/24/20 2022 04/23/20 2049  04/23/20  1410 04/23/20  1004   PH 7.43 7.43 7.42 7.46*   PCO2 39 39 37 36   PO2 160* 102 96 72*   HCO3 26 25 24 25     Complete Blood Count   Recent Labs   Lab 04/27/20  0626 04/26/20  0422 04/25/20  0405 04/24/20 2022 04/24/20  0352   WBC 3.3* 3.8* 6.6  --  7.9   HGB 10.9* 11.0* 11.2* 11.4* 12.4*   * 123* 131*  --  138*     Basic Metabolic Panel  Recent Labs   Lab 04/27/20  0626 04/26/20  1411 04/26/20  0422 04/25/20  0405 04/24/20 2022 04/24/20  0352     --  142 143 140 138   POTASSIUM 4.1 4.2 3.9 3.8 3.9 4.1   CHLORIDE 112*  --  111* 111*  --  107   CO2 PENDING  --  29 27  --  25   BUN PENDING  --  19 21  --  17   CR PENDING  --  1.07 1.13  --  1.16   GLC PENDING  --  129* 105* 120* 130*     Liver Function Tests  Recent Labs   Lab 04/24/20  0352   ALBUMIN 3.3*     Pancreatic Enzymes  No lab results found in last 7 days.  Coagulation Profile  No lab results found in last 7 days.      5. RADIOLOGY:   No results found for this or any previous visit (from the past 24 hour(s)).    =========================================

## 2020-04-27 NOTE — PROGRESS NOTES
ENT VIRTUAL FLAP CHECK    S: No acute events. MAPs >60 with no intervention. Tolerating TF. No issues with RN flap checks.    O:  /75   Pulse 58   Temp 97.8  F (36.6  C) (Axillary)   Resp 16   Ht 1.829 m (6')   Wt 92.5 kg (203 lb 14.8 oz)   SpO2 95%   BMI 27.66 kg/m      HEENT: Flap is warm and pink. CHeek is stably swollen. Neck is flat. JPs holding suction with minimal output. Strong arterial signal on handheld and implantable doppler.  MSK: Fingers are wwp    A/P: Stable flap    -Continue plan of care    Juan Pablo Pang MD  Otolaryngology-Head & Neck Surgery PGY3

## 2020-04-27 NOTE — PROGRESS NOTES
Otolaryngology Progress Note  April 27, 2020    Events: Afebrile, VS wnl, no issues with flap. Tolerating TF at continuous goal rate of 50 ml/hr (reached goal at 7pm on 4/26).   WBC trending down (3.3 today)    Subjective: Patient reports that he is doing well. Denies pain. States he is ready to go home    Objective: BP (!) 140/79   Pulse 65   Temp 97.8  F (36.6  C) (Axillary)   Resp 16   Ht 1.829 m (6')   Wt 92.5 kg (203 lb 14.8 oz)   SpO2 93%   BMI 27.66 kg/m    General: Well appearing man in no acute distress. He is lying calmly in bed. Alert and oriented x 3  HEENT: Flap is appropriately pale, warm and soft. No swelling or signs of congestion. Strong implantable and handheld doppler signals.Suture lines are intact with no dehiscence. Left cheek is edematous but soft. No fluctuance or evidence of hematoma.   Neck is soft and flat.  JPx2 holding suction with serosaonguinous  Msk: Right arm splint and dressing in place, drain and wound vac holding suction. Tip of index finger slightly numb, patient says is improving, otherwise fingers wwp. Right thigh dressing partially saturated with serosanguinous output.  Pulm: Nonlabored breathing on room air. No stridor    JOSUE drain output(s): (last 24 hours)/(last shift)  Drain 1 Right Arm:9/12/10(31) - 5  Drain 2 Left neck: 8/8/5(21) - 5  Drain 3 Left neck: 12/9/9(30) - 8      LABS:  ROUTINE IP LABS (Last four results)  BMP  Recent Labs   Lab 04/26/20  1411 04/26/20  0422 04/25/20  0405 04/24/20 2022 04/24/20  0352 04/23/20 2049   NA  --  142 143 140 138 138   POTASSIUM 4.2 3.9 3.8 3.9 4.1 4.3   CHLORIDE  --  111* 111*  --  107 107   OLGA LIDIA  --  8.6 8.0*  --  9.0 9.7   CO2  --  29 27  --  25 26   BUN  --  19 21  --  17 17   CR  --  1.07 1.13  --  1.16 1.20   GLC  --  129* 105* 120* 130* 149*     CBC  Recent Labs   Lab 04/26/20  0422 04/25/20  0405 04/24/20 2022 04/24/20  0352 04/23/20 2049   WBC 3.8* 6.6  --  7.9 11.3*   RBC 3.79* 3.93*  --  4.35* 4.63   HGB 11.0*  11.2* 11.4* 12.4* 13.4   HCT 34.3* 35.0*  --  37.8* 40.8   MCV 91 89  --  87 88   MCH 29.0 28.5  --  28.5 28.9   MCHC 32.1 32.0  --  32.8 32.8   RDW 12.4 12.5  --  12.1 12.1   * 131*  --  138* 153     ASSESSMENT & PLAN: Norris Reyes is a 59 year old male with a past medical history HTN and autoimmune disease (unspecified in pre-op H/P) admitted for surgical management of a 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 He underwent wide local excision of left buccal mucosa SCC with left modified radical neck dissection, NG tube placement, reconstruction with a right radial  forearm free flap, and placement of split thickness skin graft to forearm on 4/23 and hematoma evacuation on 4/24.    Patient is doing well. Flap remains viable. WBC downtrending and does not seem to be due to hemodilution    Neuro:  - Pain per SICU    HEENT:  - Flap protocol starting 4/24 pm  - Nurse flap checks: q1h x 72 hours (until 4/27) --> q4h  - ENT flap checks: q6h x48 hours (Virtual checks overnight) --> q8h x48 hours --> q12h until discharge.  - Local wound care to neck- rinse with normal saline and apply bacitracin QID x 24 hours followed by aquaphor QID  - Lovenox 40mg every day  -  mg (per g-tube)   - Peridex QID  - Head in neutral position  - NO straps around neck  - NO pillows- only pink pillow behind head  - Please keep room temperature at 72 degrees F  - Red rubber to suction mouth only (do not cut tip)  - Monitor and record JOSUE output q-shift. No plan to remove neck JOSUE drain today. Will reassess tomorrow     CV:  - Keep MAP > 60; SBP > 90 (preferrably > 110)  - Avoid pressors  - If MAPs drop below goal, please lighten sedation prior to bolus  - PTA losartan, fenofibrate per SICU     Respiratory:  - No active issues     GI:  - 4/23 Abdominal XR w/ appropriate NG placement  - NPO  - Last BM - 4/25  - Continue TF (isosource) at goal 50mL/h. Reached goal at 7pm on 4/26   - Once at goal for 24 hours and tolerating, we will  begin plan to transition to bolus feed   - Starting at 0100, we will stop tube feed for 6 hours then begin bolus feed at 0700   - hold for any nausea or discomfort   - okay to have small amount of coffee today in NG    - Nutrition consult for TF  - scopolamine patch and extend scheduled zofran for another 48h post hematoma evacuation  - senna docusate prn     /Renal/Electrolytes:  - Electrolyte replacement protocols    Endocrine:  - POD1 TSH 0.22, albumin 3.3, prealbumin pending  - Received IV decadron after initial procedure for oropharyngeal swelling     ID:  - post-op Unasyn completed (4/26)  - MRSA negative   - Peridex mouth rinses QID     Heme:  - WBC downtrending. Dicussed with SICU. Awaiting recommendation  - Maintain Hgb > 7       Extremities:  - Q1 hr CMS checks donor site (left upper extremity)  - Wound vac to stay in place for 5 days. Wound vac to be taken down on day 5 and then daily dressing changes (Xeroform, telfa, kerlix, ace)- will need OT consult for splint     STSG donor site  - Calcium alginate and tegaderm initially. Change as needed. Once drainage stops, cover with tegaderm. If not irritated, can leave open to air and apply aquaphor.     Consults  - PT/OT  - Nutrition  - PLC ordered for tube feeds    DISPO  - Transfer to  today    -- Patient was discussed with Dr. Castañeda and Dr. Ovi Chan MD PGY-5  Otolaryngology- Head and Neck Surgery  Pager: [phone]  Please contact ENT with questions by dialing [phone] and entering job code [phone] when prompted.

## 2020-04-27 NOTE — PROGRESS NOTES
Neuro: A&OX4, neuros intact,CMS intact, numbness to right index and middle finger remain unchanged per patient.    CV: VSS, Normal Sinus Rhythm  Resp: Patient remains on room air, sats >90%, Lungs clear, Afibrile.    GI: Passing gas, bowel sounds active, LBM on 4/25, scheduled senna administered. TF continues at 50ml/hr, with 30ml flushes q4hrs.Denies nausea/vomiting.    : voiding in urinal  Pain: Denies  Activity: Up and walking w/ supervision and minimal assist of 1  Skin: Flap checks q1hr, pale pink, warm, internal and manual doppler pulses intact.     Plan: To transfer to .    For vital signs and complete assessments, please see documentation flowsheets.

## 2020-04-27 NOTE — PROGRESS NOTES
ENT FLAP CHECK    S: No issues with nursing flap checks. MAPs>60 without intervention. Tolerating tube feeds without fullness or nausea.     O:  /75 (BP Location: Left arm)   Pulse 66   Temp 97.3  F (36.3  C) (Axillary)   Resp 16   Ht 1.829 m (6')   Wt 92.5 kg (203 lb 14.8 oz)   SpO2 95%   BMI 27.66 kg/m      Gen: Up in chair, awake and alert  Resp: Nonlabored breathing on room air  Mouth: Left buccal flap is pale and soft. Strong handheld and implantable dopplers. Stable cheek swelling. Neck is flat. JOSUE drains x2 holding suction with sanguinous output  MSK: Right arm dressing intact. Fingers are wwp.    A/P: Stable flap.    -Continue plan of care    Juan Pablo Pang MD  Otolaryngology-Head & Neck Surgery PGY3

## 2020-04-27 NOTE — PROGRESS NOTES
ENT FLAP CHECK    S: No acute events. No issues with RN flap checks. Has been tolerating TF.     O:  BP (!) 145/85   Pulse 68   Temp 97.6  F (36.4  C) (Axillary)   Resp 16   Ht 1.829 m (6')   Wt 92.8 kg (204 lb 9.4 oz)   SpO2 94%   BMI 27.75 kg/m      HEENT: Flap is warm and pink. Cheek is stably swollen. Neck is flat. JPs holding suction with minimal output. Strong arterial signal on handheld and implantable doppler.  MSK: Fingers are wwp    A/P: Stable flap    -Continue plan of care    Tray Bhat MD  Otolaryngology-Head & Neck Surgery PGY1

## 2020-04-27 NOTE — PLAN OF CARE
Discharge Planner OT   Patient plan for discharge: home  Current status: Pt ambulated from 4A to 6A, then another 10 min with SBA and no AE, appearing steady with VSS. Navigated 2 x 3 stairs without issue. SBA for toilet transfer and standing ADLs. Ongoing numbness in R 2nd & 3rd fingers.  Barriers to return to prior living situation: none  Recommendations for discharge: home with assist  Rationale for recommendations:  Is progressing well, meeting most OT goals, and should be safe for home discharge.       Entered by: Chad Klein 04/27/2020 3:54 PM

## 2020-04-27 NOTE — PROGRESS NOTES
ENT FLAP CHECK    S: No acute events. No issues with RN flap checks. Moved up to 6A. Asking about radioactivity sticker on door of room    O:  BP (!) 143/79   Pulse 74   Temp 96.4  F (35.8  C) (Axillary)   Resp 16   Ht 1.829 m (6')   Wt 92.8 kg (204 lb 9.4 oz)   SpO2 94%   BMI 27.75 kg/m      HEENT: Flap is warm and pink. Cheek is stably swollen. Neck is flat. JPs holding suction with minimal output. Strong arterial signal on handheld and implantable doppler.  MSK: Fingers are wwp    A/P: Stable flap    - radioactivity sign removed after discussion with RN, patient reassured  - Continue plan of care    Tray Bhat MD  Otolaryngology-Head & Neck Surgery PGY1

## 2020-04-28 ENCOUNTER — APPOINTMENT (OUTPATIENT)
Dept: OCCUPATIONAL THERAPY | Facility: CLINIC | Age: 60
DRG: 129 | End: 2020-04-28
Attending: OTOLARYNGOLOGY
Payer: COMMERCIAL

## 2020-04-28 LAB
ANION GAP SERPL CALCULATED.3IONS-SCNC: 3 MMOL/L (ref 3–14)
BUN SERPL-MCNC: 36 MG/DL (ref 7–30)
CALCIUM SERPL-MCNC: 9.3 MG/DL (ref 8.5–10.1)
CHLORIDE SERPL-SCNC: 113 MMOL/L (ref 94–109)
CO2 SERPL-SCNC: 30 MMOL/L (ref 20–32)
CREAT SERPL-MCNC: 1.03 MG/DL (ref 0.66–1.25)
ERYTHROCYTE [DISTWIDTH] IN BLOOD BY AUTOMATED COUNT: 12 % (ref 10–15)
GFR SERPL CREATININE-BSD FRML MDRD: 79 ML/MIN/{1.73_M2}
GLUCOSE BLDC GLUCOMTR-MCNC: 124 MG/DL (ref 70–99)
GLUCOSE BLDC GLUCOMTR-MCNC: 130 MG/DL (ref 70–99)
GLUCOSE BLDC GLUCOMTR-MCNC: 141 MG/DL (ref 70–99)
GLUCOSE BLDC GLUCOMTR-MCNC: 160 MG/DL (ref 70–99)
GLUCOSE BLDC GLUCOMTR-MCNC: 96 MG/DL (ref 70–99)
GLUCOSE BLDC GLUCOMTR-MCNC: 97 MG/DL (ref 70–99)
GLUCOSE SERPL-MCNC: 121 MG/DL (ref 70–99)
HCT VFR BLD AUTO: 35 % (ref 40–53)
HGB BLD-MCNC: 10.9 G/DL (ref 13.3–17.7)
MCH RBC QN AUTO: 28.8 PG (ref 26.5–33)
MCHC RBC AUTO-ENTMCNC: 31.1 G/DL (ref 31.5–36.5)
MCV RBC AUTO: 92 FL (ref 78–100)
PLATELET # BLD AUTO: 143 10E9/L (ref 150–450)
POTASSIUM SERPL-SCNC: 4.1 MMOL/L (ref 3.4–5.3)
RBC # BLD AUTO: 3.79 10E12/L (ref 4.4–5.9)
SODIUM SERPL-SCNC: 146 MMOL/L (ref 133–144)
WBC # BLD AUTO: 3.4 10E9/L (ref 4–11)

## 2020-04-28 PROCEDURE — 25000132 ZZH RX MED GY IP 250 OP 250 PS 637: Performed by: STUDENT IN AN ORGANIZED HEALTH CARE EDUCATION/TRAINING PROGRAM

## 2020-04-28 PROCEDURE — 36415 COLL VENOUS BLD VENIPUNCTURE: CPT | Performed by: STUDENT IN AN ORGANIZED HEALTH CARE EDUCATION/TRAINING PROGRAM

## 2020-04-28 PROCEDURE — 00000146 ZZHCL STATISTIC GLUCOSE BY METER IP

## 2020-04-28 PROCEDURE — 85027 COMPLETE CBC AUTOMATED: CPT | Performed by: STUDENT IN AN ORGANIZED HEALTH CARE EDUCATION/TRAINING PROGRAM

## 2020-04-28 PROCEDURE — 97535 SELF CARE MNGMENT TRAINING: CPT | Mod: GO | Performed by: OCCUPATIONAL THERAPIST

## 2020-04-28 PROCEDURE — 97760 ORTHOTIC MGMT&TRAING 1ST ENC: CPT | Mod: GO | Performed by: OCCUPATIONAL THERAPIST

## 2020-04-28 PROCEDURE — 80048 BASIC METABOLIC PNL TOTAL CA: CPT | Performed by: STUDENT IN AN ORGANIZED HEALTH CARE EDUCATION/TRAINING PROGRAM

## 2020-04-28 PROCEDURE — 12000001 ZZH R&B MED SURG/OB UMMC

## 2020-04-28 PROCEDURE — 25000125 ZZHC RX 250: Performed by: STUDENT IN AN ORGANIZED HEALTH CARE EDUCATION/TRAINING PROGRAM

## 2020-04-28 PROCEDURE — 25800030 ZZH RX IP 258 OP 636: Performed by: STUDENT IN AN ORGANIZED HEALTH CARE EDUCATION/TRAINING PROGRAM

## 2020-04-28 PROCEDURE — 25000128 H RX IP 250 OP 636: Performed by: STUDENT IN AN ORGANIZED HEALTH CARE EDUCATION/TRAINING PROGRAM

## 2020-04-28 RX ORDER — AMINO AC/PROTEIN HYDR/WHEY PRO 10G-100/30
1 LIQUID (ML) ORAL DAILY
Qty: 14 PACKET | Refills: 1 | Status: SHIPPED | OUTPATIENT
Start: 2020-04-29 | End: 2020-06-29

## 2020-04-28 RX ORDER — BISACODYL 10 MG
10 SUPPOSITORY, RECTAL RECTAL DAILY PRN
Status: DISCONTINUED | OUTPATIENT
Start: 2020-04-28 | End: 2020-04-29 | Stop reason: HOSPADM

## 2020-04-28 RX ORDER — POLYETHYLENE GLYCOL 3350 17 G/17G
17 POWDER, FOR SOLUTION ORAL DAILY PRN
Qty: 14 PACKET | Refills: 0 | Status: SHIPPED | OUTPATIENT
Start: 2020-04-28 | End: 2020-06-29

## 2020-04-28 RX ORDER — OXYCODONE HYDROCHLORIDE 5 MG/1
5 TABLET ORAL EVERY 6 HOURS PRN
Qty: 15 TABLET | Refills: 0 | Status: SHIPPED | OUTPATIENT
Start: 2020-04-28 | End: 2020-05-08

## 2020-04-28 RX ORDER — ACETAMINOPHEN 325 MG/1
975 TABLET ORAL EVERY 8 HOURS PRN
Qty: 60 TABLET | Refills: 1 | Status: SHIPPED | OUTPATIENT
Start: 2020-04-28 | End: 2020-06-29

## 2020-04-28 RX ORDER — AMOXICILLIN 250 MG
1 CAPSULE ORAL 2 TIMES DAILY
Qty: 40 TABLET | Refills: 0 | Status: SHIPPED | OUTPATIENT
Start: 2020-04-28 | End: 2020-05-08

## 2020-04-28 RX ORDER — ASPIRIN 325 MG
325 TABLET ORAL DAILY
Qty: 40 TABLET | Refills: 0 | Status: SHIPPED | OUTPATIENT
Start: 2020-04-29 | End: 2020-06-29

## 2020-04-28 RX ORDER — CHLORHEXIDINE GLUCONATE ORAL RINSE 1.2 MG/ML
15 SOLUTION DENTAL 4 TIMES DAILY
Qty: 473 ML | Refills: 1 | Status: SHIPPED | OUTPATIENT
Start: 2020-04-28 | End: 2020-06-29

## 2020-04-28 RX ORDER — MINERAL OIL/HYDROPHIL PETROLAT
OINTMENT (GRAM) TOPICAL EVERY 8 HOURS
Qty: 396 G | Refills: 1 | Status: SHIPPED | OUTPATIENT
Start: 2020-04-28 | End: 2020-09-02

## 2020-04-28 RX ADMIN — ACETAMINOPHEN 975 MG: 325 TABLET ORAL at 17:50

## 2020-04-28 RX ADMIN — SENNOSIDES AND DOCUSATE SODIUM 1 TABLET: 8.6; 5 TABLET ORAL at 08:18

## 2020-04-28 RX ADMIN — FENOFIBRATE 160 MG: 160 TABLET ORAL at 08:18

## 2020-04-28 RX ADMIN — WHITE PETROLATUM: 1.75 OINTMENT TOPICAL at 20:33

## 2020-04-28 RX ADMIN — CHLORHEXIDINE GLUCONATE 0.12% ORAL RINSE 15 ML: 1.2 LIQUID ORAL at 08:18

## 2020-04-28 RX ADMIN — SENNOSIDES AND DOCUSATE SODIUM 1 TABLET: 8.6; 5 TABLET ORAL at 20:27

## 2020-04-28 RX ADMIN — WHITE PETROLATUM: 1.75 OINTMENT TOPICAL at 06:02

## 2020-04-28 RX ADMIN — MULTIVITAMIN 15 ML: LIQUID ORAL at 08:18

## 2020-04-28 RX ADMIN — INSULIN ASPART 1 UNITS: 100 INJECTION, SOLUTION INTRAVENOUS; SUBCUTANEOUS at 16:14

## 2020-04-28 RX ADMIN — LOSARTAN POTASSIUM 50 MG: 50 TABLET, FILM COATED ORAL at 08:18

## 2020-04-28 RX ADMIN — ACETAMINOPHEN 975 MG: 325 TABLET ORAL at 02:36

## 2020-04-28 RX ADMIN — ENOXAPARIN SODIUM 40 MG: 40 INJECTION SUBCUTANEOUS at 08:18

## 2020-04-28 RX ADMIN — CHLORHEXIDINE GLUCONATE 0.12% ORAL RINSE 15 ML: 1.2 LIQUID ORAL at 16:04

## 2020-04-28 RX ADMIN — POTASSIUM PHOSPHATE, MONOBASIC AND POTASSIUM PHOSPHATE, DIBASIC 10 MMOL: 224; 236 INJECTION, SOLUTION INTRAVENOUS at 04:16

## 2020-04-28 RX ADMIN — ACETAMINOPHEN 975 MG: 325 TABLET ORAL at 09:29

## 2020-04-28 RX ADMIN — Medication 1 PACKET: at 08:18

## 2020-04-28 RX ADMIN — CHLORHEXIDINE GLUCONATE 0.12% ORAL RINSE 15 ML: 1.2 LIQUID ORAL at 20:33

## 2020-04-28 RX ADMIN — CHLORHEXIDINE GLUCONATE 0.12% ORAL RINSE 15 ML: 1.2 LIQUID ORAL at 11:49

## 2020-04-28 RX ADMIN — WHITE PETROLATUM: 1.75 OINTMENT TOPICAL at 11:49

## 2020-04-28 RX ADMIN — ASPIRIN 325 MG ORAL TABLET 325 MG: 325 PILL ORAL at 08:18

## 2020-04-28 ASSESSMENT — ACTIVITIES OF DAILY LIVING (ADL)
ADLS_ACUITY_SCORE: 15

## 2020-04-28 ASSESSMENT — VISUAL ACUITY
OU: NORMAL ACUITY
OU: NORMAL ACUITY

## 2020-04-28 NOTE — PROGRESS NOTES
ENT FLAP CHECK  In-Person   4/28/2020   300    S: No acute events. No flap concerns. Tube feeds stopped overnight for transition to bolus feeds     O:  BP (!) 157/85 (BP Location: Left arm)   Pulse 67   Temp 97.5  F (36.4  C) (Oral)   Resp 16   Ht 1.829 m (6')   Wt 92.8 kg (204 lb 9.4 oz)   SpO2 96%   BMI 27.75 kg/m      HEENT: Flap is warm with stable appearance. Cheek with baseline swelling. Neck is flat. JPs holding suction with minimal output. Strong arterial signal on handheld and implantable doppler.  MSK: Fingers are wwp    A/P: Stable flap    - Continue plan of care  - Continue to hold tube feeds for transition to bolus feeds in the AM   - Next flap check on AM rounds     Jeremiah Bragg, PGY2  Otolaryngology

## 2020-04-28 NOTE — DISCHARGE SUMMARY
[facility] Discharge Summary    Norris Reyes MRN# [id number]   Age: 59 year old YOB: 1960     Date of Admission:  4/23/2020  Date of Discharge::  4/29/2020  Admitting Physician:  Opal Castañeda MD  Discharge Physician:  Tray Bhat MD     Home clinic: [facility]          Admission Diagnoses:   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) [C06.0]  Secondary malignancy of lymph nodes          Discharge Diagnosis:   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) [C06.0]  Secondary malignancy of lymph nodes          Procedures:   Procedure(s): 4/23 wide local excision of left buccal mucosa squamous cell carcinoma  left modified radical neck dissection  right forearm free flap  split thickness skin graft from right thigh  Dental Exam and Surgical Extraction Teeth #13, 14 and 15,   NASOGASTRIC TUBE    4/24 EXPLORATION, NECK  INCISION AND DRAINAGE, NECK  Hematoma washout               Medications Prior to Admission:     Medications Prior to Admission   Medication Sig Dispense Refill Last Dose     diphenhydrAMINE (BENADRYL) 25 MG tablet Take 25 mg by mouth nightly as needed for sleep   4/22/2020 at 2000     fenofibrate (TRIGLIDE/LOFIBRA) 160 MG tablet Take 160 mg by mouth daily   4/23/2020 at 0500     LOSARTAN POTASSIUM PO Take 50 mg by mouth every morning    4/22/2020 at 0800     Homeopathic Products (FRANKINCENSE UPLIFTING) OIL Apply topically 2 times daily To outside of cheek                Discharge Medications:     Current Discharge Medication List      START taking these medications    Details   acetaminophen (TYLENOL) 325 MG tablet 3 tablets (975 mg) by Oral or Feeding Tube route every 8 hours as needed for mild pain  Qty: 60 tablet, Refills: 1    Associated Diagnoses: 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)      aspirin (ASA) 325 MG tablet 1 tablet (325 mg) by Oral or Feeding Tube route daily  Qty: 40 tablet, Refills: 0    Associated Diagnoses: Squamous cell  "cancer of buccal mucosa (H)      chlorhexidine (PERIDEX) 0.12 % solution Swish and spit 15 mLs in mouth 4 times daily  Qty: 473 mL, Refills: 1    Associated Diagnoses: 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)      mineral oil-hydrophilic petrolatum (AQUAPHOR) external ointment Apply topically every 8 hours To neck incisions  Qty: 396 g, Refills: 1    Associated Diagnoses: 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)      multivitamins w/minerals (CERTAVITE) liquid 15 mLs by Per Feeding Tube route daily Take when on tube feeds  Qty: 236 mL, Refills: 1    Associated Diagnoses: 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)      !! order for DME Equipment being ordered: Wound care supplies, 1 each daily x 14 days  Xeroform occlusive gauze 5\" x 9\"  Telfa non-adherent pad 8\" x 3\"  Kerlix bandage roll 4-1/2\" x 4-1/8 yd  ACE wrap, 4 inch (4 total)    Diagnosis: oral cancer  Qty: 14 days, Refills: 1    Associated Diagnoses: 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)      !! order for DME Equipment being ordered: Nasogastric bolus tube feeding supplies  Formula: TwoCal HN, 5 cans per day  Gravity feeding bags  60 mL syringes  IV pole    Treatment Diagnosis: oral cancer  Qty: 14 days, Refills: 1    Associated Diagnoses: 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)      oxyCODONE (ROXICODONE) 5 MG tablet Take 1 tablet (5 mg) by mouth every 6 hours as needed for pain  Qty: 15 tablet, Refills: 0    Associated Diagnoses: 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)      polyethylene glycol (MIRALAX) 17 g packet Take 17 g by mouth daily as needed for constipation  Qty: 14 packet, Refills: 0    Associated Diagnoses: 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)      senna-docusate (SENOKOT-S/PERICOLACE) 8.6-50 MG tablet 1 tablet by Oral or Feeding Tube route 2 times daily  Qty: 40 tablet, Refills: 0    Associated Diagnoses: 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)       !! - Potential duplicate medications found. Please discuss with provider.    "   CONTINUE these medications which have NOT CHANGED    Details   diphenhydrAMINE (BENADRYL) 25 MG tablet Take 25 mg by mouth nightly as needed for sleep      fenofibrate (TRIGLIDE/LOFIBRA) 160 MG tablet Take 160 mg by mouth daily      LOSARTAN POTASSIUM PO Take 50 mg by mouth every morning       protein modular (PROSOURCE TF) LIQD 1 packet by Per Feeding Tube route daily  Qty: 14 packet, Refills: 1    Associated Diagnoses: 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)      Homeopathic Products (FRANKINCENSE UPLIFTING) OIL Apply topically 2 times daily To outside of cheek                   Consultations:   Consultation during this admission received from SICU, PT, OT, and Nutrition          Brief History of Illness:   Norris Reyes is a 59 year old man with a 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 He had a PET scan which showed a 1.7 x 1.1 x 3.2 cm hypermetabolic lesion of the left buccal mucosa with left lymphadenopathy in levels IB-III. He was indicated for surgical resection with free flap reconstruction.           Hospital Course:   Mr. Reyes was admitted to [facility] 4/23 for surgical management of oral cancer. He underwent the above procedure 4/23 with ENT and admitted post-operatively to the SICU for flap monitoring. He developed a hematoma under his free flap in the left cheek area roughly 24 hours after his initial free flap surgery. He was taken emergently back to the OR for awake fiberoptic intubation followed by hematoma washout. Following this second procedure his flap did very well and he recovered as anticipated and experienced no further post-operative complications. He was discharged home with an NG tube for nutrition which may be removed at clinic followup with Dr. Castañeda. By discharge, all surgical drains had been removed, patient was ambulating easily, and he was  comfortable going home with his wife. He was shown how to change his left arm dressing.          Discharge Instructions and Follow-Up:   Discharge diet: NPO  5 cans TwoCal HN (2 cans breakfast, 2 lunch, 1 dinner)  1 packet of prosource, 1 packet of certavite daily  90 ml before after each bolus feeding plus additional flush of 240 ml x 3 flushes per day   Discharge activity: Activity as tolerated, no driving while on narcotics   Discharge follow-up: With Dr. Castañeda 5/8   Wound care Aquaphor to neck incisions 3-4 times daily, please keep clean with no showering. May sponge bath. Your doppler wire will be removed in clinic.    Peridex QID to oral cavity.           Discharge Disposition:   Discharged to home      Attestation:    Tray Bhat MD   ENT Resident PGY1         Teaching statement:  I saw the patient on the day of discharge. Wire and drain removed. Will need follow-up in 1 week - message sent to outpatient nursing team.    Opal Castañeda MD    Department of Otolaryngology

## 2020-04-28 NOTE — CONSULTS
04/28/20 1543 Mali Aranda, RN     Patient seen at bedside alone for NG gravity feeding and flushing. RD on himself with flushing with 60 ml if water then hooked up his gravity feeding. Was instructed to stop feeding when he tolerated 250 ml. If he can tolerate more he will let his nurse know. RN notified to check feeding as soon as she can. Discussed increasing water flushes. States he feels dehydrated and will address this with the dietician before discharge. States he feels safe doing cares. Spouse  will be home for support. Discussed site care. Literature given: Handwashing and Skin Care, Tube Feedings at Home-[place] Method, and NG/[place] Tube Home Care.

## 2020-04-28 NOTE — PLAN OF CARE
Status: L modified radial neck dissection w/ teeth extraction 4/24   Vitals: VSS, on ra   Neuros: intact. 4/5 RUE d/t cast, 5/5 throughout. Denies N/T  IV: PIV sl   Resp/trach: Clear throughout, on continuous pulse ox   Diet: NPO, continuous TF at goal of 50  Bowel status: +bs, no bm, last bm 4/25, on bm regimen   : voiding spontaneously   Skin: Flap pale pink, warm, internal and manual doppler pulses intact. Neck incision DOLORES, aquafore applied. RFA w/ ACE wrap, CDI. R thigh STSG, dressing changed, CDI.   Pain: denies, scheduled tylenol given   Activity: SBA. Steady on feet   Plan: Turn TF off at 0100 and plan for bolus TF starting tomorrow AM. Continue w/ POC.

## 2020-04-28 NOTE — PROGRESS NOTES
Otolaryngology Flap check note  April 28, 2020    Interval events: No issues with flap. Patient is tolerating bolus feeds. Had PLC today    S: Patient reports that he is doing well. No pain. Had large BM earlier today. Thinks he will be ready to go home tomorrow    O: BP (!) 150/76 (BP Location: Left arm)   Pulse 74   Temp 96  F (35.6  C) (Oral)   Resp 16   Ht 1.829 m (6')   Wt 92.8 kg (204 lb 9.4 oz)   SpO2 96%   BMI 27.75 kg/m    General: Well appearing man in no acute distress. He is lying calmly in bed. Alert and oriented x 3  HEENT: Flap is appropriately pale, warm and soft. No swelling or signs of congestion. Strong implantable and handheld doppler signals.Suture lines are intact with no dehiscence. Left cheek is soft and  with slight edema (improving). No fluctuance or evidence of hematoma.   Neck is soft and flat.  JPx1 holding suction with serosaonguinous  Msk: Right arm splint and dressing in place. CMS intact to finger  Right thigh dressing with calcium alginate is c/d/i  Pulm: Nonlabored breathing on room air. 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 He underwent wide local excision of left buccal mucosa SCC with left modified radical neck dissection, NG tube placement, reconstruction with a right radial  forearm free flap, and placement of split thickness skin graft to forearm on 4/23 and hematoma evacuation on 4/24    Flap remains viable.     - Continue plan of care  - Q8h ENT flap checks    Alecia Chan MD PGY-5  Otolaryngology- Head and Neck Surgery  Pager: [phone]  Please contact ENT with questions by dialing * [phone] and entering job code [phone] when prompted.

## 2020-04-28 NOTE — PROGRESS NOTES
Care Coordinator Progress Note    Admission Date/Time:  4/23/2020  Attending MD:  Kameron Dior MD    Data  Chart reviewed, discussed with interdisciplinary team.   Patient was admitted for:    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)  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)  S/P flap graft.    Concerns with insurance coverage for discharge needs: None identified  Current Living Situation: Patient lives with Wife   Support System: Wife, Lisa  Services Involved:   Transportation at Discharge: Wife  Transportation to Medical Appointments: Wife  Barriers to Discharge: Medical clearance    Coordination of Care and Referrals: Provided patient/family with options for Home Infusion.        Assessment  Pt with SCC of left buccal mucosa, s/p excision of buccal mucosa SCC, modified radical neck dissection and reconstruction with radial forearm free flap on 4/24/20. Per ENT, anticipated discharge in 2-3 days. I have met with Pt to assist with discharge planning. Prior to admission Pt was independent living with his Wife in [facility]. Pt will require bolus tube feedings post discharge. Choice of Home Infusion Agencies offered, [facility] is preferred. I have made a referral to [facility] #[phone]. PLC will meet with Pt this afternoon for TF teaching. Pt is confident he can learn TF administration and does not need to have his Wife come in.   Home Care Nursing visits discussed which Pt declines.     Plan  Anticipated Discharge Date:  2-3 days  Anticipated Discharge Plan:  Discharge to home with assist from Wife. [facility] for TF and supplies.    Soraida Hurd, RN  Care coordinator #[phone]

## 2020-04-28 NOTE — PROGRESS NOTES
CLINICAL NUTRITION SERVICES - BRIEF NOTE     Nutrition Prescription      Recommendations already ordered by Registered Dietitian (RD):  - Modified H20 flush order as follows; 90 ml before after each bolus feeding plus additional flush of 240 ml x 3 flushes per day. Provides a additional 1260 ml free H20 daily          Per chart review, pt reached goal rate of 50 ml/hr on continuous TF via NGT of TwoCal HN on 4/26. TF stopped over night in anticipation of starting bolus TF today.     Noted MD entered orders for bolus TFs (per RD recommendations on 4//24) beginning with by 0.5 cans (125 ml) every ~4 hrs until reach goal regimen of 2 cans BID plus 1 can x 1 feeding for total of 5 cans per day. Regimen provides 1200 ml, 2400 kcals, 101 g pro, 840 ml H20, 263 g CHO and 6 gm Fiber. Pt also receiving + 1pkt Prosource per day which provides 40 kcals and 11 g PRO for total of 2440 kcals (27 kcals/kg) and 112 g pro (1.2 g PRO/kg) per day.    Current H20 flush order for 30 ml every 4 hrs which falls short of meeting pt's hydration needs, especially given NA++ of 146 today.     Interventions:   H20 flush order - as outlined above.    Stephanie Ribera RD,LD  6A pager [phone]

## 2020-04-28 NOTE — PLAN OF CARE
OT: Splint on 24/7 off for skin checks w/ dressing changes please page Gail w/ OT at [phone] if any questions.     Discharge Planner OT   Patient plan for discharge: home  Current status: OT fabricated splint to increase ind in ADLS/IADLS, per  ENT MD request and educated pt on ADLS w/in precuations  Barriers to return to prior living situation: medical status  Recommendations for discharge: Home w/ A   Rationale for recommendations: A prn for lifting       Entered by: Gail Hewitt 04/28/2020 12:43 PM

## 2020-04-28 NOTE — OP NOTE
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      PROCEDURES:   1.  Wide local excision of left buccal mucosa.   2.  Left neck dissection, levels 1B through 4.   3.  Marginal mandibulectomy.      ANESTHESIA:  General.      OPERATIVE INDICATIONS:  Mr. Reyes is a patient who presented with this lesion and buccal mucosa that had been biopsied and proven positive for squamous cell carcinoma.  He also had a neck disease in level, N2b.  He was brought to the operating room for the procedure.      OPERATIVE PROCEDURE:  The patient was brought to the operating room and induced under general endotracheal anesthesia.  A timeout was performed.  He was prepped and draped in the usual sterile fashion.  We designed a neck incision and a possible trach incision.  We did not ultimately end up using it.  We performed a timeout after prepping and draping in the usual sterile fashion, examined the buccal mucosa.  Dr. Diehl had already removed teeth.      Dr. Castañeda was a first assistant for all portions of the resection, including the oral cavity and neck dissection.  Her assistance was vital due to lack of any additional available assistants during the covid epidemic.  She assisted with portions of the oral cavity resection as well as the critical function of retraction during the neck dissection.      We then outlined an approximately 1 cm margin around the buccal mucosa lesion.  The incision and excision came onto the mandible and the periosteum was peeled off of this, came into the retromolar trigone posteriorly superiorly into the gingival buccal sulcus of the maxilla and anteriorly about half a centimeter behind the oral commissure.  We took care to  excise the buccinator with this.  The tumor was adherent to the very lateral aspect of the mandible posteriorly and I could see a little bit after resecting the lesion of remodeling of the bone.  I therefore did a small marginal mandibulectomy.  This was done of the outer cortex of the lateral posterior body.  We sent margins and margins were negative on frozen section.  We then made our neck incision in a curvilinear fashion.  Subplatysmal flaps in superior and inferior direction.  We identified and protected the marginal mandibular nerve; however, the most inferior branch which I suspect was supplying the depressor was going directly into the tumor and I had to resect this.  The tumor in 1B appeared to be quite aggressive and that caused significant desmoplasia.  We performed a 1B dissection and preserved the lingual nerve and the twelfth nerve, but as I mentioned below, one branch of the marginal mandibular nerve did have to be sacrificed.  The mandible at the superior border , digastric was the  inferior medial border and the hypoglossal of the deep border.  We then performed a 2-4 dissection.  The superior border was digastric,  inferior border clavicle, deep border cervical roots and deep neck, the lateral border was the posterior sternocleidomastoid muscle on the lateral edge of the straps was the medial border.  We identified and preserved cranial nerves X, XI and XII.  Rechecked for chyle leak and no chyle leak was present.  At this point, Dr. Castañeda started the portion of reconstruction.  Please see notes for details of her procedure.      BLOOD LOSS:  For my portion of the procedure was about 100 mL.      COUNTS:  All sponge, instrument and needle counts were correct.         PUJA CAAL MD             D: 2020   T: 2020   MT: LM      Name:     MONSE MANZANARES   MRN:      [id number]        Account:        [id number]   :      1960           Procedure Date: 2020       Document: [id number]

## 2020-04-28 NOTE — PLAN OF CARE
02  Status: L modified radial neck dissection w/ teeth extraction 4/24   Vitals: VSS on RA   Neuros: Neuros intact. RUE 4/5 d/t splint, all other extremities 5/5.   IV: PIV infusing phos replacement.   Resp/trach: LS clear, on continuous pulse ox.   Diet: NPO, TF stopped at 0100, bolus TF to start this morning.   Bowel status: BS present, pt states passing gas. No BM this shift.   : voiding spontaneously via urinal at bedside.   Skin: L mouth flap, pale pink, soft, present. Continuous doppler to neck, present. Neck incision, sutures, Aquaphor applied. RUE w/ splint present. R thigh STSG, dressing intact. 3 JOSUE: two at base of L neck, one in RUE. Wound vac present to RUE.   Pain: Scheduled Tylenol   Activity: SBA for lines.   Plan: Continue to monitor and follow POC.

## 2020-04-28 NOTE — PLAN OF CARE
Status: Pt on 6a s/p MRND  Vitals: BP (!) 141/78 (BP Location: Left arm)   Pulse 74   Temp 97.1  F (36.2  C) (Axillary)   Resp 16   Ht 1.829 m (6')   Wt 92.8 kg (204 lb 9.4 oz)   SpO2 96%   BMI 27.75 kg/m    Neuros: A&Ox4. RUE 4/5 d/t splint, AOE 5/5.   IV: PIV SL x2  Resp/trach: On RA.   Diet: NPO, TF bolus started this morning by NG. Pt wants to wait until BM to start second bolus, which will be 1 can.   Bowel status: Small BM this AM.   : Voiding spontaneously.   Skin: Neck incision approximated and DOLORES w sutures. Cleansed w/ NS & aquaphor applied.   Pain: Denies.   Activity: Up SB in room.   Plan: TF PLC this afternoon. Continue to monitor and follow POC.

## 2020-04-28 NOTE — PROGRESS NOTES
Otolaryngology Progress Note  April 28, 2020    Events: Afebrile, VS wnl, no issues with flap. TF held overnight in anticipation of transition to bolus this morning.     Subjective: Patient reports that he is doing well.  States he is ready to go home. Patient indicates that he still has not had a BM yet but he feels it coming on. He thinks if he walks around this morning that will help. Denies pain, nausea or vomiting.    Objective: BP (!) 157/85 (BP Location: Left arm)   Pulse 67   Temp 97.5  F (36.4  C) (Oral)   Resp 16   Ht 1.829 m (6')   Wt 92.8 kg (204 lb 9.4 oz)   SpO2 95%   BMI 27.75 kg/m    General: Well appearing man in no acute distress. He is lying calmly in bed. Alert and oriented x 3  HEENT: Flap is appropriately pale, warm and soft. No swelling or signs of congestion. Strong implantable and handheld doppler signals.Suture lines are intact with no dehiscence. Left cheek is soft and  with less edema. No fluctuance or evidence of hematoma.   Neck is soft and flat.  JPx2 holding suction with serosaonguinous  Msk: Right arm splint and dressing in place, drain and wound vac holding suction. Right arm dressing and wound vac removed. Skin graft looks good with 100% take. Incision is intact and dry. Tip of index finger is still numb. Fingers wwp. Right thigh dressing with calcium alginate is c/d/i  Pulm: Nonlabored breathing on room air. No stridor    JOSUE drain output(s): (last 24 hours)/(last shift)  Drain 1 Right Arm:5/8/10(23) - NR- will removed today  Drain 2 Left neck: 5/10/7.5(22.5) - NR  Drain 3 Left neck: 8/9/7.5(24.5) - NR- will removed today    LABS:  ROUTINE IP LABS (Last four results)  BMP  Recent Labs   Lab 04/27/20  0626 04/26/20  1411 04/26/20  0422 04/25/20  0405 04/24/20 2022 04/24/20  0352     --  142 143 140 138   POTASSIUM 4.1 4.2 3.9 3.8 3.9 4.1   CHLORIDE 112*  --  111* 111*  --  107   OLGA LIDIA 9.1  --  8.6 8.0*  --  9.0   CO2 31  --  29 27  --  25   BUN 24  --  19 21  --  17   CR  1.06  --  1.07 1.13  --  1.16   *  --  129* 105* 120* 130*     CBC  Recent Labs   Lab 04/27/20  0626 04/26/20  0422 04/25/20  0405 04/24/20 2022 04/24/20  0352   WBC 3.3* 3.8* 6.6  --  7.9   RBC 3.78* 3.79* 3.93*  --  4.35*   HGB 10.9* 11.0* 11.2* 11.4* 12.4*   HCT 34.5* 34.3* 35.0*  --  37.8*   MCV 91 91 89  --  87   MCH 28.8 29.0 28.5  --  28.5   MCHC 31.6 32.1 32.0  --  32.8   RDW 12.1 12.4 12.5  --  12.1   * 123* 131*  --  138*     ASSESSMENT & PLAN: Norris Reyes is a 59 year old male with a past medical history HTN and autoimmune disease (unspecified in pre-op H/P) admitted for surgical management of a 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 He underwent wide local excision of left buccal mucosa SCC with left modified radical neck dissection, NG tube placement, reconstruction with a right radial  forearm free flap, and placement of split thickness skin graft to forearm on 4/23 and hematoma evacuation on 4/24.    Patient is doing well. Flap remains viable. Leukopenia is stable    Neuro:  - Pain per SICU    HEENT:  - Flap protocol starting 4/24 pm  - Nurse flap checks: q1h x 72 hours (until 4/27) --> q4h  - ENT flap checks: q6h x48 hours (Virtual checks overnight) --> q8h x48 hours --> q12h until discharge.  - Local wound care to neck- rinse with normal saline and apply bacitracin QID x 24 hours followed by aquaphor QID  - Lovenox 40mg every day  -  mg (per g-tube)   - Peridex QID  - Head in neutral position  - NO straps around neck  - NO pillows- only pink pillow behind head  - Please keep room temperature at 72 degrees F  - Red rubber to suction mouth only (do not cut tip)  - Monitor and record JOSUE output q-shift. Will remove right arm JOSUE drain and medial neck JOSUE drain today     CV:  - Keep MAP > 60; SBP > 90 (preferrably > 110)  - Avoid pressors  - If MAPs drop below goal, please lighten sedation prior to bolus  - PTA losartan, fenofibrate per SICU     Respiratory:  - No active issues      GI:  - 4/23 Abdominal XR w/ appropriate NG placement  - NPO  - Last BM - 4/25.  Received senna and miralax yesterday. If no BM this morning will give dulcolax supp  - Nutrition consult for TF  - Begin bolus today   Per dietitian's recs: begin first bolus with 0.5 cans (125 ml) and if tolerates after approx 4 hrs without GI complaints and/or residuals < 500 ml, rec adv each subsequent bolus by 0.5 cans (125 ml) every ~4 hrs until reach goal regimen of 2 can + 2 can + 1 can for a total of TwoCal HN, 5 cans daily.   *Do not give feedings at night while pt asleep (during the day only)*   - zofran prn  - senna docusate BID, Miralax      /Renal/Electrolytes:  - Electrolyte replacement protocols  - Na 146 today and BUN is 36- patient is likely dehydrated. Will discuss increasing free water with dietitian  - Creatinine WNL    Endocrine:  - POD1 TSH 0.22, albumin 3.3, prealbumin 15  - Received IV decadron x 2 doses after initial procedure for oropharyngeal swelling prior to extubation     ID:  - post-op Unasyn completed (4/26)  - MRSA negative   - Peridex mouth rinses QID     Heme:  - WBC stable 3.4. No evidence of an infection  - Maintain Hgb > 7       Extremities:  - Q1 hr CMS checks donor site (left upper extremity)  - Wound vac and right arm dressing removed 4/28  - Dressing changes Xeroform, telfa, kerlix, ace daily  - OT consult for arm splint     STSG donor site  - Calcium alginate and tegaderm initially. Change as needed. Once drainage stops, cover with tegaderm. If not irritated, can leave open to air and apply aquaphor.     Consults  - PT/OT  - Nutrition  - PLC ordered for tube feeds - needs to be scheduled    DISPO  - Home likely in 2-3 days    -- Patient was discussed with Dr. Castañeda and Dr. Ovi Bhat MD PGY5  Otolaryngology- Head and Neck Surgery  Pager: [phone]  Please contact ENT with questions by dialing * [phone] and entering job code [phone] when prompted.

## 2020-04-29 ENCOUNTER — HOME INFUSION (PRE-WILLOW HOME INFUSION) (OUTPATIENT)
Dept: PHARMACY | Facility: CLINIC | Age: 60
End: 2020-04-29

## 2020-04-29 VITALS
WEIGHT: 204.59 LBS | TEMPERATURE: 97.3 F | RESPIRATION RATE: 18 BRPM | DIASTOLIC BLOOD PRESSURE: 74 MMHG | BODY MASS INDEX: 27.71 KG/M2 | HEIGHT: 72 IN | OXYGEN SATURATION: 97 % | SYSTOLIC BLOOD PRESSURE: 130 MMHG | HEART RATE: 74 BPM

## 2020-04-29 LAB
ANION GAP SERPL CALCULATED.3IONS-SCNC: 4 MMOL/L (ref 3–14)
BUN SERPL-MCNC: 39 MG/DL (ref 7–30)
CALCIUM SERPL-MCNC: 9.5 MG/DL (ref 8.5–10.1)
CHLORIDE SERPL-SCNC: 112 MMOL/L (ref 94–109)
CO2 SERPL-SCNC: 29 MMOL/L (ref 20–32)
CREAT SERPL-MCNC: 1.03 MG/DL (ref 0.66–1.25)
ERYTHROCYTE [DISTWIDTH] IN BLOOD BY AUTOMATED COUNT: 12 % (ref 10–15)
GFR SERPL CREATININE-BSD FRML MDRD: 79 ML/MIN/{1.73_M2}
GLUCOSE BLDC GLUCOMTR-MCNC: 102 MG/DL (ref 70–99)
GLUCOSE BLDC GLUCOMTR-MCNC: 114 MG/DL (ref 70–99)
GLUCOSE SERPL-MCNC: 109 MG/DL (ref 70–99)
HCT VFR BLD AUTO: 36.9 % (ref 40–53)
HGB BLD-MCNC: 11.4 G/DL (ref 13.3–17.7)
MCH RBC QN AUTO: 28.3 PG (ref 26.5–33)
MCHC RBC AUTO-ENTMCNC: 30.9 G/DL (ref 31.5–36.5)
MCV RBC AUTO: 92 FL (ref 78–100)
PHOSPHATE SERPL-MCNC: 2.5 MG/DL (ref 2.5–4.5)
PLATELET # BLD AUTO: 155 10E9/L (ref 150–450)
POTASSIUM SERPL-SCNC: 3.9 MMOL/L (ref 3.4–5.3)
RBC # BLD AUTO: 4.03 10E12/L (ref 4.4–5.9)
SODIUM SERPL-SCNC: 145 MMOL/L (ref 133–144)
WBC # BLD AUTO: 4.1 10E9/L (ref 4–11)

## 2020-04-29 PROCEDURE — 00000146 ZZHCL STATISTIC GLUCOSE BY METER IP

## 2020-04-29 PROCEDURE — 25000132 ZZH RX MED GY IP 250 OP 250 PS 637: Performed by: STUDENT IN AN ORGANIZED HEALTH CARE EDUCATION/TRAINING PROGRAM

## 2020-04-29 PROCEDURE — 36415 COLL VENOUS BLD VENIPUNCTURE: CPT | Performed by: STUDENT IN AN ORGANIZED HEALTH CARE EDUCATION/TRAINING PROGRAM

## 2020-04-29 PROCEDURE — 85027 COMPLETE CBC AUTOMATED: CPT | Performed by: STUDENT IN AN ORGANIZED HEALTH CARE EDUCATION/TRAINING PROGRAM

## 2020-04-29 PROCEDURE — 25000128 H RX IP 250 OP 636: Performed by: STUDENT IN AN ORGANIZED HEALTH CARE EDUCATION/TRAINING PROGRAM

## 2020-04-29 PROCEDURE — 80048 BASIC METABOLIC PNL TOTAL CA: CPT | Performed by: STUDENT IN AN ORGANIZED HEALTH CARE EDUCATION/TRAINING PROGRAM

## 2020-04-29 PROCEDURE — 84100 ASSAY OF PHOSPHORUS: CPT | Performed by: STUDENT IN AN ORGANIZED HEALTH CARE EDUCATION/TRAINING PROGRAM

## 2020-04-29 RX ADMIN — MULTIVITAMIN 15 ML: LIQUID ORAL at 08:57

## 2020-04-29 RX ADMIN — Medication 1 PACKET: at 08:59

## 2020-04-29 RX ADMIN — WHITE PETROLATUM: 1.75 OINTMENT TOPICAL at 12:54

## 2020-04-29 RX ADMIN — WHITE PETROLATUM: 1.75 OINTMENT TOPICAL at 04:18

## 2020-04-29 RX ADMIN — FENOFIBRATE 160 MG: 160 TABLET ORAL at 08:57

## 2020-04-29 RX ADMIN — CHLORHEXIDINE GLUCONATE 0.12% ORAL RINSE 15 ML: 1.2 LIQUID ORAL at 12:23

## 2020-04-29 RX ADMIN — ENOXAPARIN SODIUM 40 MG: 40 INJECTION SUBCUTANEOUS at 08:58

## 2020-04-29 RX ADMIN — CHLORHEXIDINE GLUCONATE 0.12% ORAL RINSE 15 ML: 1.2 LIQUID ORAL at 08:57

## 2020-04-29 RX ADMIN — LOSARTAN POTASSIUM 50 MG: 50 TABLET, FILM COATED ORAL at 08:57

## 2020-04-29 RX ADMIN — ACETAMINOPHEN 975 MG: 325 TABLET ORAL at 08:57

## 2020-04-29 RX ADMIN — ASPIRIN 325 MG ORAL TABLET 325 MG: 325 PILL ORAL at 08:58

## 2020-04-29 RX ADMIN — SENNOSIDES AND DOCUSATE SODIUM 1 TABLET: 8.6; 5 TABLET ORAL at 08:58

## 2020-04-29 ASSESSMENT — ACTIVITIES OF DAILY LIVING (ADL)
ADLS_ACUITY_SCORE: 13

## 2020-04-29 NOTE — PROGRESS NOTES
ENT FLAP CHECK    S: No issues overnight. Patient reports he is feeling well and is eager to go home.     O:  BP (!) 150/76 (BP Location: Left arm)   Pulse 74   Temp 96  F (35.6  C) (Oral)   Resp 16   Ht 1.829 m (6')   Wt 92.8 kg (204 lb 9.4 oz)   SpO2 96%   BMI 27.75 kg/m      HEENT: Left buccal flap is pale and soft. Strong arterial signal on handheld and implantable dopplers. Stable swelling of cheek. Neck is flat. JPx1 holding suction with minimal serous drainage  MSK: Fingers are wwp    A/P: Stable flap    - Continue plan of care    Juan Pablo Pang MD  Otolaryngology-Head & Neck Surgery PGY3  Please contact ENT with questions by dialing * [phone] and entering job code [phone] when prompted.

## 2020-04-29 NOTE — PLAN OF CARE
Status: L modified radial neck dissection w/ oral flap and teeth extraction 4/24   VS: VSS on RA  Neuros: A&Ox4. Intact ex n/t in #2 and #3 digits (unchanged). CMS intact on RUE  GI: NPO. 3/5 TF cans completed via NG. BMx1 today  : Voiding w/out difficulty   IV: PIV x2 SL  Activity: Independent in room  Pain: Neck pain managed well w/tylenol  Skin: Neck incision DOLORES, approximated w/sutures. Aquaphor applied. R leg STSG dsg CDI. R arm dsg CDI.   Labs/Tests: Supplies obtained from home infusion.   Plan of care: Pt discharged to home. AVS reviewed w/pt w/no additional questions. Meds picked up from pharmacy. 1 day of extra supplies obtained for wound care.

## 2020-04-29 NOTE — PLAN OF CARE
VSS.  Denied pain.  A&O x 4.  L intraoral flap pale, warm, soft; +spot check x 1 and +continuous doppler x 1.  NS rinse completed x 1.  Gently oral suctioning with red dmitry.  L neck incision intact with sutures, no drainage, DOLORES; cleansed with NS and Aquaphor applied per orders.  JOSUE x 1 to L neck.  R STSG dressing intact.  RUE FFF wrapped with ace wrap; +CMS except numbness continues to digits 2 and 3 on right hand (no change since after OR).   NG clamped; working towards goal of 5 cans today (received 3 cans yesterday).  PIV x 2 SL.  NPO.  Voiding spontaneously.  Had BM yesterday.  Up ad gloria in room, steady on feet.  Plan is discharge home today vs tomorrow.  Continue with POC.

## 2020-04-29 NOTE — PLAN OF CARE
Status: L modified radial neck dissection w/ teeth extraction 4/24   Vitals: VSS, on ra   Neuros: intact. 4/5 RUE d/t cast, 5/5 throughout. Denies N/T  IV: PIV sl   Resp/trach: Clear throughout, on continuous pulse ox   Diet: NPO, bolus TF completed per advancing scale. Able to start at 2 cans of TF tomorrow am. Tolerating well.  Bowel status: bm this AM  : voiding spontaneously   Skin: Flap pale pink, warm, internal and manual doppler pulses intact. Neck incision DOLORES, aquafore applied. RFA w/ ACE wrap, CDI. R thigh STSG, CDI  Pain: denies, scheduled tylenol given   Activity: up independently. Steady on feet   Plan: PLC completed today. Pt able to complete most TF cares on his own w/ some assistance. Able to demonstrate setting up TF bolus and administering medication. Applying aquafore on own. Possible discharge tomorrow.

## 2020-04-29 NOTE — PLAN OF CARE
Occupational Therapy Discharge Summary    Reason for therapy discharge:    Discharged to home.    Progress towards therapy goal(s). See goals on Care Plan in [facility] electronic health record for goal details.  Goals not met.  Barriers to achieving goals:   discharge from facility.    Therapy recommendation(s):    No further therapy is recommended.

## 2020-04-29 NOTE — PROGRESS NOTES
Home Infusion  Norris is discharging today and will be going home on bolus enteral feeds.  He has never done home enteral therapy before however he has been to the [facility] for teaching and has done some of his own enteral cares at bedside.    Met with Norris at bedside and provided him with information about [facility] services.  Informed him that [facility] will be providing the formula and all supplies needed.  Norris does not feel he requires home nursing so no arrangements have been made for that.  Confirmed administration method preferred (gravity vs syringe) and inventoried supply needs.  Explained about delivery of supplies, storage of formula, and 24/7 availability of [facility] while on enteral therapy.   [facility] will deliver formula and supplies to the hospital for Norris to take home by 12:30 today.    Norris verbalized understanding of all information given.  He is willing and able to learn and manage home enteral therapy.   Questions answered.    Shital GRIFFIN  [facility] Liaison  [phone] ANKUSH  [email or web address]  [facility] main: [phone]

## 2020-04-29 NOTE — PROGRESS NOTES
Care Coordinator - Discharge Planning    Admission Date/Time:  4/23/2020  Attending MD:  Kameron Dior MD     Data  Date of initial CC assessment:  4/28/20  Chart reviewed, discussed with interdisciplinary team.   Patient was admitted for:   1. 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)    2. 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)    3. S/P flap graft         Assessment   Pt medically stable for discharge to home today. [facility] will deliver tube feeding formula and supplies to his hospital room prior to discharge.  Rx for dressing supplies given to Pt per his request. Pt thinks he has some of the supplies at his home.   I have given the Pt contact info for [employer] #[phone] as a resource for dressing supplies if needed.    Coordination of Care and Referrals: [facility] for Tube Feeding formula and supplies.      Plan  Anticipated Discharge Date:  4/29/20  Anticipated Discharge Plan:  Discharge to home with Out Patient follow up      Soraida Hurd RN   6B care coordinator #[phone]

## 2020-04-30 ENCOUNTER — TELEPHONE (OUTPATIENT)
Dept: OTOLARYNGOLOGY | Facility: CLINIC | Age: 60
End: 2020-04-30

## 2020-04-30 ENCOUNTER — PATIENT OUTREACH (OUTPATIENT)
Dept: CARE COORDINATION | Facility: CLINIC | Age: 60
End: 2020-04-30

## 2020-04-30 LAB — COPATH REPORT: NORMAL

## 2020-04-30 NOTE — TELEPHONE ENCOUNTER
Called patient and left a VM.    Informed him that he is scheduled for a follow up appointment on 5/8 at 12:20. Provided my direct line to call back if appointment time will not work.

## 2020-04-30 NOTE — PROGRESS NOTES
This is a recent snapshot of the patient's [facility] medical record.  For current drug dose and complete information and questions, call [phone] or In Basket pool, [facility] ([id number])  CSN Number:  [id number]

## 2020-04-30 NOTE — PROGRESS NOTES
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 He had a PET scan which showed a 1.7 x 1.1 x 3.2 cm hypermetabolic lesion of the left buccal mucosa with left lymphadenopathy in levels IB-III. He was indicated for surgical resection with free flap reconstruction.           Hospital Course:   Mr. Reyes was admitted to [facility] 4/23 for surgical management of oral cancer. He underent the above procedure 4/23 with ENT and admitted post-operatively to the SICU for flap monitoring. He developed a hematoma under his free flap in the left cheek area roughly 24 hours after his initial free flap surgery. He was taken emergently back to the OR for awake fiberoptic intubation followed by hematoma washout. Following this second procedure his flap did very well and he recovered as anticipated and experienced no further post-operative complications. He was discharged home with an NG tube for nutrition which may be removed at clinic followup with Dr. Castañeda. By discharge, all surgical drains had been removed, patient was ambulating easily, and he was comfortable going home with his wife. He was shown how to change his left arm dressing.    ASSESSMENT      Discharge Assessment  Patient reports symptoms are: Unchanged  Does the patient have all of their medications?: Yes  Does patient know what their new medications are for?: Yes  Does  patient have a follow-up appointment scheduled?: Yes  Does patient have any other questions or concerns?: No    Post-op  Did the patient have surgery or a procedure: Yes(oral cavity exploration, incision and drainage oral cavity)  Fever: No  Chills: No  PO Intake: other(5 cans TwoCal HN (2 cans breakfast, 2 lunch, 1 dinner))  Bowel Function: normal  Urinary Status: voiding without complaint/concerns    PLAN                                                      Outpatient Plan:  With Dr. Castañeda 5/8    Future Appointments   Date Time Provider Department Center   5/8/2020 12:20 PM Opal Castañeda MD [facility]     Alecia Marc, [facility]

## 2020-04-30 NOTE — PROGRESS NOTES
Head & Neck Tumor Conference Note        Status: Established  Staff: Dr. Dior    Tumor Site: Left buccal mucosa  Tumor Pathology: SCC  Tumor Stage: pT4a pN3b M0  Tumor Treatment:   - WLE left buccal mucosa, left neck dissection 1b-4, marginal mandibulectomy, right radial forearm free flap 4/23/20    Reason for Review: Review imaging, path, and POC    Brief History: This is a 59 year old male with a history of known white patches and perhaps some erythroplakia in his oral cavity.  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  He has noticed a lump under his left jaw bone.  He has not had much pain from this.  He has not noticed any parotid swelling.  He has had no numbness of his tongue or cheek.  He has no otalgia or odynophagia. Examination in the office demonstrated a 3x2.5cm lesion on the left buccal mucosa that spared the oral commissure but does involve the gingivobuccal sulcus posteriorly with some involvement of the posterior mandibular alveolus without evidence of caitlin invasion. It did not seem to involve the subcutaneous tissues of the cheek. There was an obvious mass in left level Ib.    The patient underwent a WLE of the left buccal mucosa, left neck dissection 1b-4, marginal mandibulectomy and right RFF on 4/23/20.    Pertinent PMH:   Past Medical History:   Diagnosis Date     Autoimmune disease (H) 2/11/2020     Hypertension 2/11/2020    Losartan     Nephrolithiasis      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)       Smoking Hx:   Social History     Tobacco Use     Smoking status: Never Smoker     Smokeless tobacco: Never Used   Substance Use Topics     Alcohol use: Yes     Comment: 1 drink a day but not currently     Drug use: Never     Pathology:   FINAL DIAGNOSIS:   A. WIDE LOCAL EXCISION BUCCAL MUCOSA:   - Invasive keratinizing moderately to poorly  differentiated squamous cell carcinoma with focal  sarcomatoid transformation   - Tumor size: 5.1 cm   - The depth invasion: 1.2 cm   - Negative margins   - Tumor is less than 1.0 mm from the deep margin in this part (see other parts)   - No lymphovascular invasion   - No perineural invasion     B. PERIOSTEAL DEEP MARGIN:   - Invasive poorly differentiated squamous cell carcinoma     C. ANTERIOR MARGIN:   - Benign muscle with chronic inflammation   - Negative for malignancy     D. ANTEROLATERAL MARGIN:   - Benign squamous mucosa with chronic inflammation   - Negative for malignancy     E. POSTERIOLATERAL MARGIN:   - Benign squamous mucosa   - Negative for malignancy     F. POSTERIOR MARGIN:   - Benign fibrous tissue and skeletal muscle   - Negative for malignancy     G. POSTERIOR MEDIAL MARGIN:   - Benign squamous mucosa   - Negative for malignancy     H. ANTERIOR MEDIAL MARGIN:   - Benign squamous mucosa   - Negative for malignancy     I. LYMPH NODES, LEFT LEVEL 1B:   - Two of four lymph nodes with metastatic carcinoma (2/4)   - Largest metastasis is 5.7 cm with extranodal extension, extensive   - Benign submandibular gland     J. LYMPH NODES, LEFT LEVEL 2A:   - One of eight lymph nodes with metastatic carcinoma (1/8)   - Largest metastasis is 1.7 cm   - No extranodal extension     K. LYMPH NODES, LEFT LEVEL 3:   - One of ten lymph nodes with metastatic carcinoma (1/10)   - Largest metastasis is 6.0 mm   - No extranodal extension     L. LYMPH NODES, LEFT LEVEL 4:   - Thirteen negative lymph nodes (0/13)   - Negative for malignancy     M. LYMPH NODES, LEFT LEVEL 2B:   - Five negative lymph nodes (0/5)   - Negative for malignancy     N. NEW DEEP MARGIN, MARGINAL MANDIBULECTOMY:   - Benign bone   - Negative for malignancy     O. LYMPH NODE, ADDITIONAL LEVEL 1B:   - One lymph node with metastatic carcinoma (1/1)   - Metastatic carcinoma is 7.0 mm   - Extranodal extension identified, focal     COMMENT:   The final  diagnoses confirm the interpretations provided intraoperatively.     Tumor Board Recommendation:   Discussion: 58yo male with pT4a pN3b M0 SCC of the left buccal mucosa s/p WLE of the left buccal mucosa, left neck dissection 1b-4, marginal mandibulectomy and right RFF on 4/23/20. Final pathology showed sarcomatoid features, a positive deep periosteal margin, DOI of 12mm, no PNI or LVI, and four positive lymph nodes (largest 5.7cm in Level IB with GLADYS).    The pathology was reviewed and the positive deep margin is likely due to the way the margin was taken.     Plan:   -Adjuvant chemoradiation    Juan Pablo Pang MD PGY-3  Otolaryngology- Head and Neck Surgery    Documentation / Disclaimer Cancer Tumor Board Note  Cancer tumor board recommendations do not override what is determined to be reasonable care and treatment, which is dependent on the circumstances of a patient's case; the patient's medical, social, and personal concerns; and the clinical judgment of the oncologist [physician].

## 2020-05-01 ENCOUNTER — TUMOR CONFERENCE (OUTPATIENT)
Dept: ONCOLOGY | Facility: CLINIC | Age: 60
End: 2020-05-01
Payer: COMMERCIAL

## 2020-05-01 NOTE — PROGRESS NOTES
Called patient and wife with the following pathology results:    FINAL DIAGNOSIS:   A. WIDE LOCAL EXCISION BUCCAL MUCOSA:        - Invasive keratinizing moderately to poorly differentiated squamous   cell carcinoma with focal   sarcomatoid transformation   - Tumor size: 5.1 cm   - The depth invasion: 1.2 cm   - Negative margins   - Tumor is less than 1.0 mm from the deep margin in this part (see other   parts)   - No lymphovascular invasion   - No perineural invasion     B. PERIOSTEAL DEEP MARGIN:   - Invasive poorly differentiated squamous cell carcinoma     C. ANTERIOR MARGIN:   - Benign muscle with chronic inflammation   - Negative for malignancy     D. ANTEROLATERAL MARGIN:   - Benign squamous mucosa with chronic inflammation   - Negative for malignancy     E. POSTERIOLATERAL MARGIN:   - Benign squamous mucosa   - Negative for malignancy     F. POSTERIOR MARGIN:   - Benign fibrous tissue and skeletal muscle   - Negative for malignancy     G. POSTERIOR MEDIAL MARGIN:   - Benign squamous mucosa   - Negative for malignancy     H. ANTERIOR MEDIAL MARGIN:   - Benign squamous mucosa   - Negative for malignancy     I. LYMPH NODES, LEFT LEVEL 1B:   - Two of four lymph nodes with metastatic carcinoma (2/4)   - Largest metastasis is 5.7 cm with extranodal extension, extensive   - Benign submandibular gland     J. LYMPH NODES, LEFT LEVEL 2A:   - One of eight lymph nodes with metastatic carcinoma (1/8)   - Largest metastasis is 1.7 cm   - No extranodal extension     K. LYMPH NODES, LEFT LEVEL 3:   - One of ten lymph nodes with metastatic carcinoma (1/10)   - Largest metastasis is 6.0 mm   - No extranodal extension     L. LYMPH NODES, LEFT LEVEL 4:   - Thirteen negative lymph nodes (0/13)   - Negative for malignancy     M. LYMPH NODES, LEFT LEVEL 2B:   - Five negative lymph nodes (0/5)   - Negative for malignancy     N. NEW DEEP MARGIN, MARGINAL MANDIBULECTOMY:   - Benign bone   - Negative for malignancy     O. LYMPH NODE,  ADDITIONAL LEVEL 1B:   - One lymph node with metastatic carcinoma (1/1)   - Metastatic carcinoma is 7.0 mm   - Extranodal extension identified, focal       Reviewed with patient that pathology was reviewed at tumor board and recommendation is for post-op chemo/rads. Reviewed with patient that recommendation is for him to undergo treatment here if at all possible. Discuss [facility] with patient and that given COVID-19 pandemic this is closed at this time but they will work with patient to arrange hotel accommodations if approved. Patient would like to think about his options and will contact writer on Monday. We will then work to arrange consults. He has already completed his dental extractions but will still need guards/trays. We will arrange dental appointment to obtain these and coordinate with another visit.     Of note patient does state that he has some minor left sided swelling. He feels that this swelling is slowly improving but does continue. He is using tylenol and Ibuprofen which is helping with the swelling. He denies, increased redness, pain, warmth to touch or fever. He will continue to monitor and contact writer if there is any new or worsening symptoms. He did ask if it was okay to use Ice or heat on the area. This was reviewed with Dr. Castañeda and she would not like him to use heat or ice due to the pedicle of his flap sitting at that site. Patient verbalized understanding. He will contact writer with any further questions or concerns.     Chani Elise, RN, BSN

## 2020-05-04 ENCOUNTER — HOME INFUSION (PRE-WILLOW HOME INFUSION) (OUTPATIENT)
Dept: PHARMACY | Facility: CLINIC | Age: 60
End: 2020-05-04

## 2020-05-05 NOTE — PROGRESS NOTES
This is a recent snapshot of the patient's [facility] medical record.  For current drug dose and complete information and questions, call [phone] or In Basket pool, [facility] ([id number])  CSN Number:  [id number]

## 2020-05-07 ENCOUNTER — PATIENT OUTREACH (OUTPATIENT)
Dept: OTOLARYNGOLOGY | Facility: CLINIC | Age: 60
End: 2020-05-07

## 2020-05-07 ENCOUNTER — DOCUMENTATION ONLY (OUTPATIENT)
Dept: CARE COORDINATION | Facility: CLINIC | Age: 60
End: 2020-05-07

## 2020-05-07 NOTE — PROGRESS NOTES
Contacted dental clinic as patient had extractions done in OR already but needs to complete radiation clearance and obtain trays. Dental clinic will review with Dr. Diehl and  with plan. They are aware patient coming tomorrow from [place] for follow-up and will try to coordinate if possible.     Chani Elise, RN, BSN

## 2020-05-08 ENCOUNTER — OFFICE VISIT (OUTPATIENT)
Dept: OTOLARYNGOLOGY | Facility: CLINIC | Age: 60
End: 2020-05-08
Payer: COMMERCIAL

## 2020-05-08 ENCOUNTER — TELEPHONE (OUTPATIENT)
Dept: OTOLARYNGOLOGY | Facility: CLINIC | Age: 60
End: 2020-05-08

## 2020-05-08 VITALS
TEMPERATURE: 97.4 F | RESPIRATION RATE: 19 BRPM | DIASTOLIC BLOOD PRESSURE: 81 MMHG | BODY MASS INDEX: 26.74 KG/M2 | WEIGHT: 191 LBS | SYSTOLIC BLOOD PRESSURE: 118 MMHG | HEART RATE: 83 BPM | HEIGHT: 71 IN

## 2020-05-08 DIAGNOSIS — C06.0 SQUAMOUS CELL CANCER OF BUCCAL MUCOSA (H): Primary | ICD-10-CM

## 2020-05-08 ASSESSMENT — PAIN SCALES - GENERAL: PAINLEVEL: MODERATE PAIN (4)

## 2020-05-08 ASSESSMENT — MIFFLIN-ST. JEOR: SCORE: 1712.62

## 2020-05-08 NOTE — ANESTHESIA POSTPROCEDURE EVALUATION
Anesthesia POST Procedure Evaluation    Patient: Norris Reyes   MRN:     [id number] Gender:   male   Age:    59 year old :      1960        Preoperative Diagnosis: 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) [C06.0]   Procedure(s):  wide local excision of left buccal mucosa squamous cell carcinoma  left modified radical neck dissection  right forearm free flap  split thickness skin graft from right thigh  Dental Exam and Surgical Extraction Teeth #13, 14 and 15, NASOGASTRIC TUBE PLACEMENT   Postop Comments: No value filed.     Anesthesia Type: General       Disposition: Admission   Postop Pain Control: Uneventful            Sign Out: Well controlled pain   PONV: No   Neuro/Psych: Uneventful            Sign Out: Acceptable/Baseline neuro status   Airway/Respiratory: Uneventful            Sign Out: Acceptable/Baseline resp. status   CV/Hemodynamics: Uneventful            Sign Out: Acceptable CV status   Other NRE: NONE   DID A NON-ROUTINE EVENT OCCUR? No         Last Anesthesia Record Vitals:  CRNA VITALS  2020 1808 - 2020 1908      2020             Resp Rate (observed):  (!) 6          Last PACU Vitals:  No vitals data found for the desired time range.        Electronically Signed By: Sarah Wachter, MD, May 7, 2020, 10:05 PM

## 2020-05-08 NOTE — NURSING NOTE
"Chief Complaint   Patient presents with     RECHECK     follow up      Blood pressure 118/81, pulse 83, temperature 97.4  F (36.3  C), resp. rate 19, height 1.81 m (5' 11.26\"), weight 86.6 kg (191 lb).    Travis Kaur LPN    "

## 2020-05-08 NOTE — LETTER
5/8/2020       RE: Norris Reyes  [address]  [place] [address]     Dear Colleague,    Thank you for referring your patient, Norris Reyes, to the [facility] EAR NOSE AND THROAT at [facility]. Please see a copy of my visit note below.    Dear Dr. Dior:    I had the pleasure of seeing Norris Reyes in follow-up today at the [facility].     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  He had a PET scan performed in April 2020 demonstrating 1.7 x 1.1 x 3.2cm lesion on the left buccal mucosa along with hypermetabolic lymph nodes in multiple levels in the neck.  He was taken to the operating room on 4/23/2020 for wide local excision of the left buccal mucosa with marginal mandibulectomy and left neck dissection with Dr. Dior.  I performed a radial forearm free flap for reconstruction.  His postoperative course was complicated by hematoma of the cheek that required control in the operating room.  His final pathology demonstrated a 5.1cm moderately to poorly differentiated squamous cell carcinoma with focal sarcomatoid transformation, depth of invasion 1.2 cm, negative margins, involvement of the periosteum but negative bone, no lymphovascular invasion, no perineural invasion, 5/41 positive lymph nodes with largest deposit measuring 5.7 cm, GLADYS present (greater than 2 mm).  He has been recommended for postoperative chemoradiation.  He did meet with radiation oncology preoperatively but has been resistant to scheduling an appointment with medical oncology.    He comes in today for follow-up.  He reports that he self started himself on oral intake.  He has been taking water by mouth as well as  "coffee and recently started yogurt by mouth.  He reports that is not having any difficulty with swallowing.  He does notice that a few stitches popped out yesterday.  He says that he has swelling of the cheek that is worse throughout the day but better after keeping his head elevated at night.  He is not having significant pain.  He wants his feeding tube removed.  He is doing the arm dressing daily.      MEDICATIONS:     Current Outpatient Medications   Medication Sig Dispense Refill     acetaminophen (TYLENOL) 325 MG tablet 3 tablets (975 mg) by Oral or Feeding Tube route every 8 hours as needed for mild pain 60 tablet 1     aspirin (ASA) 325 MG tablet 1 tablet (325 mg) by Oral or Feeding Tube route daily 40 tablet 0     chlorhexidine (PERIDEX) 0.12 % solution Swish and spit 15 mLs in mouth 4 times daily 473 mL 1     diphenhydrAMINE (BENADRYL) 25 MG tablet Take 25 mg by mouth nightly as needed for sleep       fenofibrate (TRIGLIDE/LOFIBRA) 160 MG tablet Take 160 mg by mouth daily       Homeopathic Products (FRANKINCENSE UPLIFTING) OIL Apply topically 2 times daily To outside of cheek       LOSARTAN POTASSIUM PO Take 50 mg by mouth every morning        mineral oil-hydrophilic petrolatum (AQUAPHOR) external ointment Apply topically every 8 hours To neck incisions 396 g 1     multivitamins w/minerals (CERTAVITE) liquid 15 mLs by Per Feeding Tube route daily Take when on tube feeds 236 mL 1     order for DME Equipment being ordered: Wound care supplies, 1 each daily x 14 days  Xeroform occlusive gauze 5\" x 9\"  Telfa non-adherent pad 8\" x 3\"  Kerlix bandage roll 4-1/2\" x 4-1/8 yd  ACE wrap, 4 inch (4 total)    Diagnosis: oral cancer 14 days 1     order for DME Equipment being ordered: Nasogastric bolus tube feeding supplies  Formula: TwoCal HN, 5 cans per day  Gravity feeding bags  60 mL syringes  IV pole    Treatment Diagnosis: oral cancer 14 days 1     polyethylene glycol (MIRALAX) 17 g packet Take 17 g by mouth " daily as needed for constipation 14 packet 0     protein modular (PROSOURCE TF) LIQD 1 packet by Per Feeding Tube route daily 14 packet 1       ALLERGIES:  No Known Allergies    HABITS/SOCIAL HISTORY:     Nonsmoker    Social History     Socioeconomic History     Marital status:      Spouse name: Not on file     Number of children: Not on file     Years of education: Not on file     Highest education level: Not on file   Occupational History     Not on file   Social Needs     Financial resource strain: Not on file     Food insecurity     Worry: Not on file     Inability: Not on file     Transportation needs     Medical: Not on file     Non-medical: Not on file   Tobacco Use     Smoking status: Never Smoker     Smokeless tobacco: Never Used   Substance and Sexual Activity     Alcohol use: Yes     Comment: 1 drink a day but not currently     Drug use: Never     Sexual activity: Not Currently     Partners: Female     Birth control/protection: Male Surgical   Lifestyle     Physical activity     Days per week: Not on file     Minutes per session: Not on file     Stress: Not on file   Relationships     Social connections     Talks on phone: Not on file     Gets together: Not on file     Attends Christian service: Not on file     Active member of club or organization: Not on file     Attends meetings of clubs or organizations: Not on file     Relationship status: Not on file     Intimate partner violence     Fear of current or ex partner: Not on file     Emotionally abused: Not on file     Physically abused: Not on file     Forced sexual activity: Not on file   Other Topics Concern     Not on file   Social History Narrative     Not on file       PAST MEDICAL HISTORY:   Past Medical History:   Diagnosis Date     Autoimmune disease (H) 2/11/2020     Hypertension 2/11/2020    Losartan     Nephrolithiasis      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)         PAST SURGICAL HISTORY:   Past Surgical History:  "  Procedure Laterality Date     DISSECTION RADICAL NECK MODIFIED Left 2020    Procedure: left modified radical neck dissection;  Surgeon: Kameron Dior MD;  Location: [facility] OR     EXCISE LESION INTRAORAL Left 2020    Procedure: wide local excision of left buccal mucosa squamous cell carcinoma;  Surgeon: Kameron Dior MD;  Location: [facility] OR     EXCISE MASS  INTRAORAL Left 2020    Procedure: oral cavity exploration, incision and drainage oral cavity;  Surgeon: Opal Castañeda MD;  Location: [facility] OR     GRAFT FREE VASCULARIZED (LOCATION) Right 2020    Procedure: right forearm free flap;  Surgeon: Opal Castañeda MD;  Location: [facility] OR     GRAFT SKIN SPLIT THICKNESS FROM EXTREMITY Right 2020    Procedure: split thickness skin graft from right thigh;  Surgeon: Opal Castañeda MD;  Location: [facility] OR     ODONTECTOMY N/A 2020    Procedure: Dental Exam and Surgical Extraction Teeth #13, 14 and 15, NASOGASTRIC TUBE PLACEMENT;  Surgeon: Diana Diehl DDS;  Location: [facility] OR       FAMILY HISTORY:    Family History   Problem Relation Age of Onset     Heart Disease Brother              Thyroid Disease Sister      Hypertension Mother      No Known Problems Father        REVIEW OF SYSTEMS:  12 point ROS was negative other than the symptoms noted above in the HPI.  Patient Supplied Answers to Review of Systems  [facility] ROS 2020   Gastrointestinal/Genitourinary Diarrhea   Hematologic -         PHYSICAL EXAMINATION:   /81   Pulse 83   Temp 97.4  F (36.3  C)   Resp 19   Ht 1.81 m (5' 11.26\")   Wt 86.6 kg (191 lb)   BMI 26.45 kg/m    Patient in no apparent distress  Breathing comfortably on room air  NG tube in place  Minimal swelling of the left cheek on visible inspection, no fluctuance  Intraorally there is a healthy-appearing free flap along the left cheek.  There is a dehiscence along the posterior aspect of the flap near the alveolus.  This does appear to track " deeply towards the cheek.  There is some blood present but no active bleeding.  It appears that her recent stitch popped.  There is trismus present on exam.  The left neck incision is healing appropriately without any fluid collection or fluctuance.  No dehiscence.  The forearm donor site is healing appropriately with nylon sutures in place.  The split-thickness skin graft demonstrates good take.  There were 2 small seromas which were drained along the skin graft site.    RESULTS REVIEWED:     SPECIMEN(S):   A: Wide local excision buccal mucosa   B: Periosteal deep margin   C: Anterior margin   D: Anterolateral margin   E: Posteriolateral margin   F: Posterior margin   G: Posterior medial margin   H: Anterior medial margin   I: Lymph nodes, left level 1B   J: Lymph nodes, left level 2A   K: Lymph nodes, left level 3   L: Lymph nodes, left level 4   M: Lymph nodes, left level 2B   N: New deep margin, marginal mandibulectomy   O: Lymph node, additional level 1B     FINAL DIAGNOSIS:   A. WIDE LOCAL EXCISION BUCCAL MUCOSA:        - Invasive keratinizing moderately to poorly differentiated squamous   cell carcinoma with focal   sarcomatoid transformation   - Tumor size: 5.1 cm   - The depth invasion: 1.2 cm   - Negative margins   - Tumor is less than 1.0 mm from the deep margin in this part (see other   parts)   - No lymphovascular invasion   - No perineural invasion     B. PERIOSTEAL DEEP MARGIN:   - Invasive poorly differentiated squamous cell carcinoma     C. ANTERIOR MARGIN:   - Benign muscle with chronic inflammation   - Negative for malignancy     D. ANTEROLATERAL MARGIN:   - Benign squamous mucosa with chronic inflammation   - Negative for malignancy     E. POSTERIOLATERAL MARGIN:   - Benign squamous mucosa   - Negative for malignancy     F. POSTERIOR MARGIN:   - Benign fibrous tissue and skeletal muscle   - Negative for malignancy     G. POSTERIOR MEDIAL MARGIN:   - Benign squamous mucosa   - Negative for  malignancy     H. ANTERIOR MEDIAL MARGIN:   - Benign squamous mucosa   - Negative for malignancy     I. LYMPH NODES, LEFT LEVEL 1B:   - Two of four lymph nodes with metastatic carcinoma (2/4)   - Largest metastasis is 5.7 cm with extranodal extension, extensive   - Benign submandibular gland     J. LYMPH NODES, LEFT LEVEL 2A:   - One of eight lymph nodes with metastatic carcinoma (1/8)   - Largest metastasis is 1.7 cm   - No extranodal extension     K. LYMPH NODES, LEFT LEVEL 3:   - One of ten lymph nodes with metastatic carcinoma (1/10)   - Largest metastasis is 6.0 mm   - No extranodal extension     L. LYMPH NODES, LEFT LEVEL 4:   - Thirteen negative lymph nodes (0/13)   - Negative for malignancy     M. LYMPH NODES, LEFT LEVEL 2B:   - Five negative lymph nodes (0/5)   - Negative for malignancy     N. NEW DEEP MARGIN, MARGINAL MANDIBULECTOMY:   - Benign bone   - Negative for malignancy     O. LYMPH NODE, ADDITIONAL LEVEL 1B:   - One lymph node with metastatic carcinoma (1/1)   - Metastatic carcinoma is 7.0 mm   - Extranodal extension identified, focal     COMMENT:   The final diagnoses confirm the interpretations provided intraoperatively.     Report Name: Lip and Oral Cavity        Status: Submitted Checklist Inst: 1      Last Updated By: Divya Plascencia M.D., PhD, Physicians, 04/30/2020   11:00:21   Part(s) Involved:   A: Wide local excision buccal mucosa     Synoptic Report:     SPECIMEN     Procedure:         - Excision         - Mandibulectomy - Marginal         - Neck (lymph node) dissection - Levels I to IV     TUMOR     Tumor Site:         - Oral - Buccal     Tumor Laterality:         - Left     Histologic Type:           - Squamous cell carcinoma, conventional     Histologic Grade:         - G3: Poorly differentiated     Tumor Size: 5.1 cm     Tumor Depth of Invasion (DOI): 12 mm     Tumor Focality:         - Unifocal     Lymphovascular Invasion:         - Not identified     Perineural Invasion:         -  Not identified     MARGINS     Specimen Margins:         - Uninvolved by invasive tumor       Distance from Closest Margin (Millimeters):           - At least 1 mm       Location of Closest Margin, per Orientation:           Deep       Status of Non-Invasive Tumor at Margins:           - Uninvolved by high-grade dysplasia / in situ disease         Distance from Closest Margin (Millimeters):             - Cannot be determined - Multiple parts     LYMPH NODES     Regional Lymph Nodes:         - Lymph nodes present     Number of Lymph Nodes Involved: 5     Laterality of Lymph Nodes Involved:         - Ipsilateral (including midline)     Size of Largest Metastatic Deposit: 5.7 cm     Extranodal Extension (GLADYS):         - Present         - ENEma (> 2 mm)     Number of Lymph Nodes Examined: 41     PATHOLOGIC STAGE CLASSIFICATION (PTNM, AJCC 8TH EDITION)     Primary Tumor (pT):         - pT4a     Regional Lymph Nodes (pN):         - pN3b       IMPRESSION AND PLAN:   Norris Reyes is a 59-year-old man with a history of a T4aN3b squamous cell carcinoma with sarcomatoid features of the left buccal mucosa.  He underwent surgical resection with free flap reconstruction on 4/23/2020.    He is still healing from the surgery.  Unfortunately he started himself on a diet.  He does have some dehiscence of the flap posteriorly.  I am concerned about him starting on a full diet at this point.  I recommended that he remain n.p.o. an NG tube stay in place.  He was understandably disappointed with this news.    He has been recommended for postoperative chemoradiation which was previously discussed with him.  At this time he is agreeable to scheduling the appointment with medical oncology.  He will need to see radiation oncology in the coming weeks for a simulation.  He already underwent dental extractions in the operating room in preparation for radiation.    I did not have speech pathology do preradiation teaching with him today as I do  not want him doing jaw stretching exercises quite yet.    We will see him back in about 10 days.    Thank you very much for the opportunity to participate in the care of your patient.      Opal Castañeda MD, M.D.  Otolaryngology- Head & Neck Surgery      This note was dictated with voice recognition software and then edited. Please excuse any unintentional errors.         CC:  Dwayne Kurtz MD  Department of Radiation Oncology  [facility]      Kameron Dior MD  Otolaryngology/Head & Neck Surgery  [facility] [address]

## 2020-05-08 NOTE — TELEPHONE ENCOUNTER
Called and spoke with [facility] infusion to ensure that they ship out additional enteral feedings as well as new Y-sites for end of NG tube. They will ship out to patient but state that they will not be able to get it to him until Monday.    Updated patient and advised him to use ensure through his tube until Monday when he will receive additional supply.    Chani Elise, RN, BSN

## 2020-05-08 NOTE — PATIENT INSTRUCTIONS
1. Please follow-up in clinic on 5/20 with Dr. Castañeda.   2. Chani will call you with medical oncology consult and when you should see Dr. Kurtz again.   3. Please call the ENT clinic with any questions,concerns, new or worsening symptoms.    -Clinic number is [phone]   - Chani's direct line (Dr. Castañeda's nurse) [phone]

## 2020-05-09 NOTE — PROGRESS NOTES
Dear Dr. Dior:    I had the pleasure of seeing Norris Reyes in follow-up today at the [facility].     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  He had a PET scan performed in April 2020 demonstrating 1.7 x 1.1 x 3.2cm lesion on the left buccal mucosa along with hypermetabolic lymph nodes in multiple levels in the neck.  He was taken to the operating room on 4/23/2020 for wide local excision of the left buccal mucosa with marginal mandibulectomy and left neck dissection with Dr. Dior.  I performed a radial forearm free flap for reconstruction.  His postoperative course was complicated by hematoma of the cheek that required control in the operating room.  His final pathology demonstrated a 5.1cm moderately to poorly differentiated squamous cell carcinoma with focal sarcomatoid transformation, depth of invasion 1.2 cm, negative margins, involvement of the periosteum but negative bone, no lymphovascular invasion, no perineural invasion, 5/41 positive lymph nodes with largest deposit measuring 5.7 cm, GLADYS present (greater than 2 mm).  He has been recommended for postoperative chemoradiation.  He did meet with radiation oncology preoperatively but has been resistant to scheduling an appointment with medical oncology.    He comes in today for follow-up.  He reports that he self started himself on oral intake.  He has been taking water by mouth as well as coffee and recently started yogurt by mouth.  He reports that is not having any difficulty with swallowing.  He does notice that a few stitches popped out yesterday.  He says that he has swelling of the cheek that is worse throughout the day but better after keeping his head elevated at  "night.  He is not having significant pain.  He wants his feeding tube removed.  He is doing the arm dressing daily.      MEDICATIONS:     Current Outpatient Medications   Medication Sig Dispense Refill     acetaminophen (TYLENOL) 325 MG tablet 3 tablets (975 mg) by Oral or Feeding Tube route every 8 hours as needed for mild pain 60 tablet 1     aspirin (ASA) 325 MG tablet 1 tablet (325 mg) by Oral or Feeding Tube route daily 40 tablet 0     chlorhexidine (PERIDEX) 0.12 % solution Swish and spit 15 mLs in mouth 4 times daily 473 mL 1     diphenhydrAMINE (BENADRYL) 25 MG tablet Take 25 mg by mouth nightly as needed for sleep       fenofibrate (TRIGLIDE/LOFIBRA) 160 MG tablet Take 160 mg by mouth daily       Homeopathic Products (FRANKINCENSE UPLIFTING) OIL Apply topically 2 times daily To outside of cheek       LOSARTAN POTASSIUM PO Take 50 mg by mouth every morning        mineral oil-hydrophilic petrolatum (AQUAPHOR) external ointment Apply topically every 8 hours To neck incisions 396 g 1     multivitamins w/minerals (CERTAVITE) liquid 15 mLs by Per Feeding Tube route daily Take when on tube feeds 236 mL 1     order for DME Equipment being ordered: Wound care supplies, 1 each daily x 14 days  Xeroform occlusive gauze 5\" x 9\"  Telfa non-adherent pad 8\" x 3\"  Kerlix bandage roll 4-1/2\" x 4-1/8 yd  ACE wrap, 4 inch (4 total)    Diagnosis: oral cancer 14 days 1     order for DME Equipment being ordered: Nasogastric bolus tube feeding supplies  Formula: TwoCal HN, 5 cans per day  Gravity feeding bags  60 mL syringes  IV pole    Treatment Diagnosis: oral cancer 14 days 1     polyethylene glycol (MIRALAX) 17 g packet Take 17 g by mouth daily as needed for constipation 14 packet 0     protein modular (PROSOURCE TF) LIQD 1 packet by Per Feeding Tube route daily 14 packet 1       ALLERGIES:  No Known Allergies    HABITS/SOCIAL HISTORY:     Nonsmoker    Social History     Socioeconomic History     Marital status: "      Spouse name: Not on file     Number of children: Not on file     Years of education: Not on file     Highest education level: Not on file   Occupational History     Not on file   Social Needs     Financial resource strain: Not on file     Food insecurity     Worry: Not on file     Inability: Not on file     Transportation needs     Medical: Not on file     Non-medical: Not on file   Tobacco Use     Smoking status: Never Smoker     Smokeless tobacco: Never Used   Substance and Sexual Activity     Alcohol use: Yes     Comment: 1 drink a day but not currently     Drug use: Never     Sexual activity: Not Currently     Partners: Female     Birth control/protection: Male Surgical   Lifestyle     Physical activity     Days per week: Not on file     Minutes per session: Not on file     Stress: Not on file   Relationships     Social connections     Talks on phone: Not on file     Gets together: Not on file     Attends Je[employer] witness service: Not on file     Active member of club or organization: Not on file     Attends meetings of clubs or organizations: Not on file     Relationship status: Not on file     Intimate partner violence     Fear of current or ex partner: Not on file     Emotionally abused: Not on file     Physically abused: Not on file     Forced sexual activity: Not on file   Other Topics Concern     Not on file   Social History Narrative     Not on file       PAST MEDICAL HISTORY:   Past Medical History:   Diagnosis Date     Autoimmune disease (H) 2/11/2020     Hypertension 2/11/2020    Losartan     Nephrolithiasis      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)         PAST SURGICAL HISTORY:   Past Surgical History:   Procedure Laterality Date     DISSECTION RADICAL NECK MODIFIED Left 4/23/2020    Procedure: left modified radical neck dissection;  Surgeon: Kameron Dior MD;  Location: [facility] OR     EXCISE LESION INTRAORAL Left 4/23/2020    Procedure: wide local excision of left buccal mucosa squamous  "cell carcinoma;  Surgeon: Kameron Dior MD;  Location: [facility] OR     EXCISE MASS  INTRAORAL Left 2020    Procedure: oral cavity exploration, incision and drainage oral cavity;  Surgeon: Opal Castañeda MD;  Location: [facility] OR     GRAFT FREE VASCULARIZED (LOCATION) Right 2020    Procedure: right forearm free flap;  Surgeon: Opal Castañeda MD;  Location: [facility]     GRAFT SKIN SPLIT THICKNESS FROM EXTREMITY Right 2020    Procedure: split thickness skin graft from right thigh;  Surgeon: Opal Castañeda MD;  Location: [facility] OR     ODONTECTOMY N/A 2020    Procedure: Dental Exam and Surgical Extraction Teeth #13, 14 and 15, NASOGASTRIC TUBE PLACEMENT;  Surgeon: Diana Diehl DDS;  Location: [facility] OR       FAMILY HISTORY:    Family History   Problem Relation Age of Onset     Heart Disease Brother              Thyroid Disease Sister      Hypertension Mother      No Known Problems Father        REVIEW OF SYSTEMS:  12 point ROS was negative other than the symptoms noted above in the HPI.  Patient Supplied Answers to Review of Systems  [facility] ROS 2020   Gastrointestinal/Genitourinary Diarrhea   Hematologic -         PHYSICAL EXAMINATION:   /81   Pulse 83   Temp 97.4  F (36.3  C)   Resp 19   Ht 1.81 m (5' 11.26\")   Wt 86.6 kg (191 lb)   BMI 26.45 kg/m    Patient in no apparent distress  Breathing comfortably on room air  NG tube in place  Minimal swelling of the left cheek on visible inspection, no fluctuance  Intraorally there is a healthy-appearing free flap along the left cheek.  There is a dehiscence along the posterior aspect of the flap near the alveolus.  This does appear to track deeply towards the cheek.  There is some blood present but no active bleeding.  It appears that her recent stitch popped.  There is trismus present on exam.  The left neck incision is healing appropriately without any fluid collection or fluctuance.  No dehiscence.  The forearm donor site is " healing appropriately with nylon sutures in place.  The split-thickness skin graft demonstrates good take.  There were 2 small seromas which were drained along the skin graft site.    RESULTS REVIEWED:     SPECIMEN(S):   A: Wide local excision buccal mucosa   B: Periosteal deep margin   C: Anterior margin   D: Anterolateral margin   E: Posteriolateral margin   F: Posterior margin   G: Posterior medial margin   H: Anterior medial margin   I: Lymph nodes, left level 1B   J: Lymph nodes, left level 2A   K: Lymph nodes, left level 3   L: Lymph nodes, left level 4   M: Lymph nodes, left level 2B   N: New deep margin, marginal mandibulectomy   O: Lymph node, additional level 1B     FINAL DIAGNOSIS:   A. WIDE LOCAL EXCISION BUCCAL MUCOSA:        - Invasive keratinizing moderately to poorly differentiated squamous   cell carcinoma with focal   sarcomatoid transformation   - Tumor size: 5.1 cm   - The depth invasion: 1.2 cm   - Negative margins   - Tumor is less than 1.0 mm from the deep margin in this part (see other   parts)   - No lymphovascular invasion   - No perineural invasion     B. PERIOSTEAL DEEP MARGIN:   - Invasive poorly differentiated squamous cell carcinoma     C. ANTERIOR MARGIN:   - Benign muscle with chronic inflammation   - Negative for malignancy     D. ANTEROLATERAL MARGIN:   - Benign squamous mucosa with chronic inflammation   - Negative for malignancy     E. POSTERIOLATERAL MARGIN:   - Benign squamous mucosa   - Negative for malignancy     F. POSTERIOR MARGIN:   - Benign fibrous tissue and skeletal muscle   - Negative for malignancy     G. POSTERIOR MEDIAL MARGIN:   - Benign squamous mucosa   - Negative for malignancy     H. ANTERIOR MEDIAL MARGIN:   - Benign squamous mucosa   - Negative for malignancy     I. LYMPH NODES, LEFT LEVEL 1B:   - Two of four lymph nodes with metastatic carcinoma (2/4)   - Largest metastasis is 5.7 cm with extranodal extension, extensive   - Benign submandibular gland     J.  LYMPH NODES, LEFT LEVEL 2A:   - One of eight lymph nodes with metastatic carcinoma (1/8)   - Largest metastasis is 1.7 cm   - No extranodal extension     K. LYMPH NODES, LEFT LEVEL 3:   - One of ten lymph nodes with metastatic carcinoma (1/10)   - Largest metastasis is 6.0 mm   - No extranodal extension     L. LYMPH NODES, LEFT LEVEL 4:   - Thirteen negative lymph nodes (0/13)   - Negative for malignancy     M. LYMPH NODES, LEFT LEVEL 2B:   - Five negative lymph nodes (0/5)   - Negative for malignancy     N. NEW DEEP MARGIN, MARGINAL MANDIBULECTOMY:   - Benign bone   - Negative for malignancy     O. LYMPH NODE, ADDITIONAL LEVEL 1B:   - One lymph node with metastatic carcinoma (1/1)   - Metastatic carcinoma is 7.0 mm   - Extranodal extension identified, focal     COMMENT:   The final diagnoses confirm the interpretations provided intraoperatively.     Report Name: Lip and Oral Cavity        Status: Submitted Checklist Inst: 1      Last Updated By: Divya Plascencia M.D., PhD, [facility], 04/30/2020   11:00:21   Part(s) Involved:   A: Wide local excision buccal mucosa     Synoptic Report:     SPECIMEN     Procedure:         - Excision         - Mandibulectomy - Marginal         - Neck (lymph node) dissection - Levels I to IV     TUMOR     Tumor Site:         - Oral - Buccal     Tumor Laterality:         - Left     Histologic Type:           - Squamous cell carcinoma, conventional     Histologic Grade:         - G3: Poorly differentiated     Tumor Size: 5.1 cm     Tumor Depth of Invasion (DOI): 12 mm     Tumor Focality:         - Unifocal     Lymphovascular Invasion:         - Not identified     Perineural Invasion:         - Not identified     MARGINS     Specimen Margins:         - Uninvolved by invasive tumor       Distance from Closest Margin (Millimeters):           - At least 1 mm       Location of Closest Margin, per Orientation:           Deep       Status of Non-Invasive Tumor at Margins:           - Uninvolved  by high-grade dysplasia / in situ disease         Distance from Closest Margin (Millimeters):             - Cannot be determined - Multiple parts     LYMPH NODES     Regional Lymph Nodes:         - Lymph nodes present     Number of Lymph Nodes Involved: 5     Laterality of Lymph Nodes Involved:         - Ipsilateral (including midline)     Size of Largest Metastatic Deposit: 5.7 cm     Extranodal Extension (GLADYS):         - Present         - ENEma (> 2 mm)     Number of Lymph Nodes Examined: 41     PATHOLOGIC STAGE CLASSIFICATION (PTNM, AJCC 8TH EDITION)     Primary Tumor (pT):         - pT4a     Regional Lymph Nodes (pN):         - pN3b       IMPRESSION AND PLAN:   Norris Reyes is a 59-year-old man with a history of a T4aN3b squamous cell carcinoma with sarcomatoid features of the left buccal mucosa.  He underwent surgical resection with free flap reconstruction on 4/23/2020.    He is still healing from the surgery.  Unfortunately he started himself on a diet.  He does have some dehiscence of the flap posteriorly.  I am concerned about him starting on a full diet at this point.  I recommended that he remain n.p.o. an NG tube stay in place.  He was understandably disappointed with this news.    He has been recommended for postoperative chemoradiation which was previously discussed with him.  At this time he is agreeable to scheduling the appointment with medical oncology.  He will need to see radiation oncology in the coming weeks for a simulation.  He already underwent dental extractions in the operating room in preparation for radiation.    I did not have speech pathology do preradiation teaching with him today as I do not want him doing jaw stretching exercises quite yet.    We will see him back in about 10 days.    Thank you very much for the opportunity to participate in the care of your patient.      Opal Castañeda MD, M.D.  Otolaryngology- Head & Neck Surgery      This note was dictated with voice  recognition software and then edited. Please excuse any unintentional errors.         CC:  Dwayne Kurtz MD  Department of Radiation Oncology  [facility]      Kameron Dior MD  Otolaryngology/Head & Neck Surgery  [facility]

## 2020-05-12 ENCOUNTER — TELEPHONE (OUTPATIENT)
Dept: ONCOLOGY | Facility: CLINIC | Age: 60
End: 2020-05-12

## 2020-05-13 ENCOUNTER — VIRTUAL VISIT (OUTPATIENT)
Dept: RADIATION ONCOLOGY | Facility: CLINIC | Age: 60
End: 2020-05-13
Attending: RADIOLOGY
Payer: COMMERCIAL

## 2020-05-13 DIAGNOSIS — C06.0 SQUAMOUS CELL CANCER OF BUCCAL MUCOSA (H): Primary | ICD-10-CM

## 2020-05-13 NOTE — PROGRESS NOTES
"Norris Reyes is a 59 year old male who is being evaluated via a billable telephone visit.      The patient has been notified of following:     \"This telephone visit will be conducted via a call between you and your physician/provider. We have found that certain health care needs can be provided without the need for a physical exam.  This service lets us provide the care you need with a short phone conversation.  If a prescription is necessary we can send it directly to your pharmacy.  If lab work is needed we can place an order for that and you can then stop by our lab to have the test done at a later time.    Telephone visits are billed at different rates depending on your insurance coverage. During this emergency period, for some insurers they may be billed the same as an in-person visit.  Please reach out to your insurance provider with any questions.    If during the course of the call the physician/provider feels a telephone visit is not appropriate, you will not be charged for this service.\"    Patient has given verbal consent for Telephone visit?  Yes    What phone number would you like to be contacted at? [phone]    How would you like to obtain your AVS? Damir    Pt will discuss radiation with Dr Kurtz.  Patient has more questions after his consultation visit.  "

## 2020-05-13 NOTE — TELEPHONE ENCOUNTER
ONCOLOGY INTAKE: Records Information      APPT INFORMATION: 5/15/20 - Leonard - Video  Referring provider:  Garry  Referring provider s clinic:  [facility]  Reason for visit/diagnosis:  Squamous cell cancer of buccal mucosa    Has patient been notified of appointment date and time?: Yes per Gloria    RECORDS INFORMATION:  Were the records received with the referral (via [employer])? Internal referral    Has patient been seen for any external appt for this diagnosis? No    If yes, where? NA    Has patient had any imaging or procedures outside of [place] for this condition? No      If Yes, where? NA    ADDITIONAL INFORMATION:  Scheduled via IB from Gloria - she confirmed with pt's wife - no call needed

## 2020-05-13 NOTE — PROGRESS NOTES
"Norris Reyes is a 59 year old male who is being evaluated via a billable telephone visit.      The patient has been notified of following:     \"This telephone visit will be conducted via a call between you and your physician/provider. We have found that certain health care needs can be provided without the need for a physical exam.  This service lets us provide the care you need with a short phone conversation.  If a prescription is necessary we can send it directly to your pharmacy.  If lab work is needed we can place an order for that and you can then stop by our lab to have the test done at a later time.    Telephone visits are billed at different rates depending on your insurance coverage. During this emergency period, for some insurers they may be billed the same as an in-person visit.  Please reach out to your insurance provider with any questions.    If during the course of the call the physician/provider feels a telephone visit is not appropriate, you will not be charged for this service.\"    Patient has given verbal consent for Telephone visit?  Yes    How would you like to obtain your AVS? MyChart      Mr. Reyes was contacted by telephone today to review the results of his pathology and discuss the plan of care for adjuvant therapy.     Surgical pathology ([id number]) revealed a 5.1 cm moderate to poorly differentiated squamous cell carcinoma with focal sarcomatoid transformation. The depth of invasion was 1.2 cm. There was no lymphovascular invasion nor perineural invasion. The soft tissue margins were negative (closest: 1 mm to the deep margin). The periosteal deep margin was positive for disease however this was reviewed in our recent tumor board and felt to be potentially artifactual due to specimen acquisition and processing. Metastatic disease was found within 3/5 left level IB (largest: 5.7 cm, +GLADYS), 1/8 left IIA (1.7 cm, -GLADYS) and 1/10 left level III (0.6 cm, -GLADYS) nodes with no evidence of " disease within the sampled level IIB (0/5) or left IV (0/13) nodes). Final surgical staging was pT4a N3b M0.    Given the high risk features including advanced T-stage, multiple positive nodes and presence of extranodal extension, the tumor board unanimously recommended proceeding with adjuvant chemoradiotherapy for improved locoregional disease control.     I discussed all of this at length with Mr. Reyes as well as the logistics and potential acute and late-term side effects associated with head and neck radiotherapy. I specifically proposed a treatment plan consisting of 66 Gy / 33 fractions with Dr. Leonard in medical oncology planning to deliver concurrent high-dose cisplatin.    Mr. Reyes was agreeable to proceed with treatment and I will have him return next Wednesday, 5/20/2020, for a CT-simulation session. He was recently found to have some dehiscence of the flap by Dr. Castañeda after he had advanced his diet so I will coordinate with her to ensure that this is adequately healed before proceeding with adjuvant therapy while being cognizant to minimize his treatment package time.    Phone call start time: 1329  Phone call end time: 1359  Phone call duration: 30 minutes    Dwayne Kurtz MD/PhD    Dept of Radiation Oncology  [facility]

## 2020-05-15 ENCOUNTER — VIRTUAL VISIT (OUTPATIENT)
Dept: ONCOLOGY | Facility: CLINIC | Age: 60
End: 2020-05-15
Attending: INTERNAL MEDICINE
Payer: COMMERCIAL

## 2020-05-15 ENCOUNTER — PRE VISIT (OUTPATIENT)
Dept: ONCOLOGY | Facility: CLINIC | Age: 60
End: 2020-05-15

## 2020-05-15 DIAGNOSIS — C06.0 SQUAMOUS CELL CANCER OF BUCCAL MUCOSA (H): Primary | ICD-10-CM

## 2020-05-15 PROCEDURE — 40000114 ZZH STATISTIC NO CHARGE CLINIC VISIT

## 2020-05-15 PROCEDURE — 99205 OFFICE O/P NEW HI 60 MIN: CPT | Mod: 95 | Performed by: INTERNAL MEDICINE

## 2020-05-15 NOTE — PROGRESS NOTES
"Norris Reyes is a 59 year old male who is being evaluated via a billable video visit.      The patient has been notified of following:     \"This video visit will be conducted via a call between you and your physician/provider. We have found that certain health care needs can be provided without the need for an in-person physical exam.  This service lets us provide the care you need with a video conversation.  If a prescription is necessary we can send it directly to your pharmacy.  If lab work is needed we can place an order for that and you can then stop by our lab to have the test done at a later time.    Video visits are billed at different rates depending on your insurance coverage.  Please reach out to your insurance provider with any questions.    If during the course of the call the physician/provider feels a video visit is not appropriate, you will not be charged for this service.\"    Patient has given verbal consent for Video visit? Yes    How would you like to obtain your AVS? Hoseahart    Patient would like the video invitation sent by: Send to e-mail at: [email or web address]    Will anyone else be joining your video visit? No        *Pt has no new needs  Domitila Felton CMA on 5/15/2020 at 1:31 PM      Video-Visit Details    Type of service:  Video Visit    Video Start Time: 2:04 PM  Video End Time: 315    Originating Location (pt. 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 He has followed with a " dentist for this.  Earlier this year, his dentist noted that the leukoplakia was worse.  The patient was not noticing a lot of new symptoms initially, but did start to develop some soreness on his L buccal mucosa.  He had a biopsy of the L oral mucosa lesion that showed p16- moderately differentiated squamous  Cell carcinoma.  He underwent a PET CT on 4/8/20 and this showed a 1.7x3.2cm hypermetabolic lesion on L buccal mucosa and several hypermetabolic LNs in the neck (LL1b-3).  There were no contralateral nodes seen. On 4/10/20 he was discussed at tumor board with a plan for WLE and L neck dissection with further treatment to be decided based on pathology.  He saw Dr. Kurtz prior to this surgery in consultation on 4/15/20. He had oral cavity resection and L neck dissection on 4/23/20 with R radial freeflap graft. He also had tooth extractions at the same time.  Pathology came back with with a 5.1cm poorly differnetiated squamous cell ca with focal sarcomatoid transformation. DOI was 1.2cm. Margins were overall negative, but were close on the deep margin.  There was 2/4 LN positive in LL1b the largest being 5.7cm with GLADYS. There was one additional node in the LL1b with GLADYS.  There were positive LNs identified in LL2a (1/8) and in LL3 (1/10).      His course was complicated by wound bleeding in his mouth and he went back to OR on 4/24 for a washout.  He had an NG tube placement for feedings and has been on this ever since. His course has not been complicated since then.  HOwever, he did restart oral feeding on his own and was found to have some wound dehiscence in his L buccal mucosa when he saw Dr. Castañeda on 5/8.  Thus he is now NPO and receiving tube feeds. He saw Dr. Kurtz again on 5/13 and I am meeting him today for the first time to discuss the role of adjuvant chemotherapy in addition to radiaion.     He is overall feeling ok.  He does not have a lot of symptoms right now. He is not having a lot of pain.  He is  wanting to eat food, but is being compliant with remaining NPO and is looking forward to being allowed to eat.  He says he is using tube feeds actually extra from what he was recommended and feels this is going well. He has gained a few pounds.  He otherwise really does not have a lot of other symptoms. He is active at baseline and continues to be in spite of having recent surgery.       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 He is a lifelong never smoker and never used chewing tobacco. He was never a heavy drinkier but does drink on occasion.   He lives with his wife    [place]  No family hx of cancers  Brother - heart disease    Review Of Systems  10-point review of systems were negative except as noted in HPI.        EXAM:  There were no vitals taken for this visit.  GEN: alert and oriented x 3, nad  HEENT: eomi, sclera anicteric, NG tube in R nostril  EXT: no clubbing, cyanosis, or edema, His RUE is bandaged from free flap.   NEURO: CN in the face are intact, Moves his extremities normally.   Rest of exam is deferred due to restriction of coronavirus pandemic.     Current Outpatient Medications   Medication Sig Dispense Refill     aspirin (ASA) 325 MG tablet 1 tablet (325 mg) by Oral or Feeding Tube route daily 40 tablet 0     chlorhexidine (PERIDEX) 0.12 % solution Swish and spit 15 mLs in mouth 4 times daily 473 mL 1     diphenhydrAMINE (BENADRYL) 25 MG tablet Take 25 mg by mouth nightly as needed for sleep       fenofibrate (TRIGLIDE/LOFIBRA) 160 MG tablet Take 160 mg by mouth daily       Homeopathic Products (FRANKINCENSE UPLIFTING) OIL Apply topically 2 times daily To outside of cheek       LOSARTAN POTASSIUM PO Take 50 mg by mouth every morning        multivitamins w/minerals (CERTAVITE) liquid 15 mLs by Per Feeding Tube route daily Take when on tube feeds 236 mL 1     order for DME Equipment being ordered: Wound care supplies, 1 each  "daily x 14 days  Xeroform occlusive gauze 5\" x 9\"  Telfa non-adherent pad 8\" x 3\"  Kerlix bandage roll 4-1/2\" x 4-1/8 yd  ACE wrap, 4 inch (4 total)    Diagnosis: oral cancer 14 days 1     order for DME Equipment being ordered: Nasogastric bolus tube feeding supplies  Formula: TwoCal HN, 5 cans per day  Gravity feeding bags  60 mL syringes  IV pole    Treatment Diagnosis: oral cancer 14 days 1     polyethylene glycol (MIRALAX) 17 g packet Take 17 g by mouth daily as needed for constipation 14 packet 0     protein modular (PROSOURCE TF) LIQD 1 packet by Per Feeding Tube route daily 14 packet 1     acetaminophen (TYLENOL) 325 MG tablet 3 tablets (975 mg) by Oral or Feeding Tube route every 8 hours as needed for mild pain (Patient not taking: Reported on 5/13/2020) 60 tablet 1     mineral oil-hydrophilic petrolatum (AQUAPHOR) external ointment Apply topically every 8 hours To neck incisions (Patient not taking: Reported on 5/13/2020) 396 g 1           Recent Labs   Lab Test 04/29/20  0548   WBC 4.1   HGB 11.4*        @labrcent[na,potassium,chloride,co2,bun,cr@  Recent Labs   Lab Test 04/24/20  0352   ALBUMIN 3.3*         Recent Results (from the past 744 hour(s))   XR Abdomen Port 1 View    Narrative    Exam: XR ABDOMEN PORT 1 , 4/23/2020 7:56 PM    Indication: please evaluate for NG tube placement    Comparison: 4/8/2020 PET/CT    Findings:   Supine frontal view of the abdomen. Enteric tube tip and sidehole  project over the stomach. Air-filled, nondilated loops of small and  large bowel. No pneumatosis. Hyperattenuating focus projecting over  the left 12th rib corresponds to a metallic foreign body in the skin  seen on comparison PET CT. Multilevel degenerative change in the  lumbar spine.      Impression    Impression: Enteric tube tip and sidehole in the stomach.    SUSAN CASTLE, DO   XR Chest Port 1 View    Narrative    Exam: XR CHEST PORT 1 , 4/23/2020 9:08 PM    Indication: ET tube placement " check    Comparison: 4/8/2020 PET/CT    Findings:   Endotracheal tube tip at the upper thoracic trachea. Surgical drain in  the left neck. Enteric tube courses below the diaphragm and off image  margin. The cardiomediastinal silhouette and pulmonary vasculature are  within normal limits. Streaky perihilar and bibasilar opacities. No  appreciable pleural effusion or pneumothorax.      Impression    Impression:   1. Endotracheal tube tip at the upper thoracic trachea.  2. Left neck surgical drain.  3. Streaky perihilar and medial bibasilar opacities, likely  atelectasis.    SUSAN CASTLE, DO   XR Chest Port 1 View    Narrative    Exam: XR CHEST PORT 1 VW, 4/23/2020 10:32 PM    Indication: follow up after ETT advancement    Comparison: Same day    Findings:   Portable radiograph the chest. ET tube distal tip projects over the  upper thoracic trachea. Enteric tube distal tip is not seen. Surgical  drain in the left neck. Cardiac silhouette is unchanged. Shaky  perihilar opacities unchanged. Partially visualized streaky left  basilar opacities similar to prior although the left lung base is  collimated outside the field of view.      Impression    Impression:   1. ET tube distal tip projects over the upper thoracic trachea.  2. Unchanged perihilar and bibasilar airspace opacities.    I have personally reviewed the examination and initial interpretation  and I agree with the findings.    ALF CUMMINGS MD           Assessment/Plan  ECOG PS 0  [id number] squamous cell ca with sarcomatoid differentiation - I reviewed with him his pathology revealing the disease in the lymph nodes. He already knew why we were doing radiation afterwards, but was not clear as to why to add chemotherapy.  We discussed the fact that GLADYS in lymph usually indicates a higher risk and defines the subset of patients that benefit from concurrent chemoradiation. We discussed that the benefit of adjuvant radiation was much higher than the benefit of  adding chemotherapy, but there is a consistent benefit to adding chemotherapy to improve the chance of cure.    I affirmed with him that we will be treating him with intent to cure.  While he does not fit the demographic profile of most of the studied patient (never smoker/p16-), I feel he has a 60-70% chance of cure, possibly better.     We discussed the standard of care is high dose cisplatin given 100mg/m2 every 3 weeks concurrent with radiation.   We discussed toxicity of cisplatin including renal toxicity, ototoxicity, nausea/vomiting, myelosuppression, and neuropathy.  We discussed alternative of doing weekly cisplatin, weekly erbitux, or weekly carbo/taxol, but given that he has no contraindications to high dose treatment, there are several studies indicating that alternative treatments are less effective.      We discussed the importance of good nutrition during treatment.  He currently has an NG tube.  I told him I thought he would likely benefit from G tube placement as prophylaxis, however, at this time he is not really wanting this. He will think about it. We discussed that sometimes if we want it later, it is not so easy to get it placed.  He currently has an NG tube placement and we discussed we could put NG tube in to start feeding as needed.     We also discussed using a port and again at this point, he would rather avoid that. He feels he has good veins for IVs and feels pretty confident he can do without.  We discussed that this is fine and we may need to place one if nurses have difficulty with finding IVs.     Otherwise, I answered his questions to his stated satisfaction.     Plan  1. Plan to start HD cisplatin in early June - date TBD by Dr. Kurtz  2. Weekly KARLI visits during treatment and prior to day 1  3. Chemotherapy teaching with Gloria Gacria  4. F/u with me towards end of radiation therapy.     I spent 70 minutes with the patient.  >50% of the time was spent in counseling and  coordination of care.    Myles Leonard   of Medicine  Division of Hematology, Oncology, and Transplantation

## 2020-05-18 RX ORDER — SODIUM CHLORIDE 9 MG/ML
1000 INJECTION, SOLUTION INTRAVENOUS CONTINUOUS PRN
Status: CANCELLED
Start: 2020-07-20

## 2020-05-18 RX ORDER — NALOXONE HYDROCHLORIDE 0.4 MG/ML
.1-.4 INJECTION, SOLUTION INTRAMUSCULAR; INTRAVENOUS; SUBCUTANEOUS
Status: CANCELLED | OUTPATIENT
Start: 2020-07-20

## 2020-05-18 RX ORDER — HEPARIN SODIUM,PORCINE 10 UNIT/ML
5 VIAL (ML) INTRAVENOUS
Status: CANCELLED | OUTPATIENT
Start: 2020-06-08

## 2020-05-18 RX ORDER — SODIUM CHLORIDE 9 MG/ML
1000 INJECTION, SOLUTION INTRAVENOUS CONTINUOUS PRN
Status: CANCELLED
Start: 2020-06-29

## 2020-05-18 RX ORDER — ALBUTEROL SULFATE 0.83 MG/ML
2.5 SOLUTION RESPIRATORY (INHALATION)
Status: CANCELLED | OUTPATIENT
Start: 2020-06-29

## 2020-05-18 RX ORDER — NALOXONE HYDROCHLORIDE 0.4 MG/ML
.1-.4 INJECTION, SOLUTION INTRAMUSCULAR; INTRAVENOUS; SUBCUTANEOUS
Status: CANCELLED | OUTPATIENT
Start: 2020-06-29

## 2020-05-18 RX ORDER — EPINEPHRINE 1 MG/ML
0.3 INJECTION, SOLUTION INTRAMUSCULAR; SUBCUTANEOUS EVERY 5 MIN PRN
Status: CANCELLED | OUTPATIENT
Start: 2020-07-20

## 2020-05-18 RX ORDER — PALONOSETRON 0.05 MG/ML
0.25 INJECTION, SOLUTION INTRAVENOUS ONCE
Status: CANCELLED
Start: 2020-06-08

## 2020-05-18 RX ORDER — METHYLPREDNISOLONE SODIUM SUCCINATE 125 MG/2ML
125 INJECTION, POWDER, LYOPHILIZED, FOR SOLUTION INTRAMUSCULAR; INTRAVENOUS
Status: CANCELLED
Start: 2020-07-20

## 2020-05-18 RX ORDER — ALBUTEROL SULFATE 90 UG/1
1-2 AEROSOL, METERED RESPIRATORY (INHALATION)
Status: CANCELLED
Start: 2020-06-29

## 2020-05-18 RX ORDER — HEPARIN SODIUM,PORCINE 10 UNIT/ML
5 VIAL (ML) INTRAVENOUS
Status: CANCELLED | OUTPATIENT
Start: 2020-07-20

## 2020-05-18 RX ORDER — HEPARIN SODIUM (PORCINE) LOCK FLUSH IV SOLN 100 UNIT/ML 100 UNIT/ML
5 SOLUTION INTRAVENOUS
Status: CANCELLED | OUTPATIENT
Start: 2020-06-29

## 2020-05-18 RX ORDER — ALBUTEROL SULFATE 90 UG/1
1-2 AEROSOL, METERED RESPIRATORY (INHALATION)
Status: CANCELLED
Start: 2020-07-20

## 2020-05-18 RX ORDER — PALONOSETRON 0.05 MG/ML
0.25 INJECTION, SOLUTION INTRAVENOUS ONCE
Status: CANCELLED
Start: 2020-06-29

## 2020-05-18 RX ORDER — MEPERIDINE HYDROCHLORIDE 25 MG/ML
25 INJECTION INTRAMUSCULAR; INTRAVENOUS; SUBCUTANEOUS EVERY 30 MIN PRN
Status: CANCELLED | OUTPATIENT
Start: 2020-06-29

## 2020-05-18 RX ORDER — DIPHENHYDRAMINE HYDROCHLORIDE 50 MG/ML
50 INJECTION INTRAMUSCULAR; INTRAVENOUS
Status: CANCELLED
Start: 2020-06-08

## 2020-05-18 RX ORDER — MEPERIDINE HYDROCHLORIDE 25 MG/ML
25 INJECTION INTRAMUSCULAR; INTRAVENOUS; SUBCUTANEOUS EVERY 30 MIN PRN
Status: CANCELLED | OUTPATIENT
Start: 2020-07-20

## 2020-05-18 RX ORDER — METHYLPREDNISOLONE SODIUM SUCCINATE 125 MG/2ML
125 INJECTION, POWDER, LYOPHILIZED, FOR SOLUTION INTRAMUSCULAR; INTRAVENOUS
Status: CANCELLED
Start: 2020-06-08

## 2020-05-18 RX ORDER — METHYLPREDNISOLONE SODIUM SUCCINATE 125 MG/2ML
125 INJECTION, POWDER, LYOPHILIZED, FOR SOLUTION INTRAMUSCULAR; INTRAVENOUS
Status: CANCELLED
Start: 2020-06-29

## 2020-05-18 RX ORDER — PALONOSETRON 0.05 MG/ML
0.25 INJECTION, SOLUTION INTRAVENOUS ONCE
Status: CANCELLED
Start: 2020-07-20

## 2020-05-18 RX ORDER — NALOXONE HYDROCHLORIDE 0.4 MG/ML
.1-.4 INJECTION, SOLUTION INTRAMUSCULAR; INTRAVENOUS; SUBCUTANEOUS
Status: CANCELLED | OUTPATIENT
Start: 2020-06-08

## 2020-05-18 RX ORDER — LORAZEPAM 2 MG/ML
0.5 INJECTION INTRAMUSCULAR EVERY 4 HOURS PRN
Status: CANCELLED
Start: 2020-06-29

## 2020-05-18 RX ORDER — LORAZEPAM 2 MG/ML
0.5 INJECTION INTRAMUSCULAR EVERY 4 HOURS PRN
Status: CANCELLED
Start: 2020-07-20

## 2020-05-18 RX ORDER — LORAZEPAM 2 MG/ML
0.5 INJECTION INTRAMUSCULAR EVERY 4 HOURS PRN
Status: CANCELLED
Start: 2020-06-08

## 2020-05-18 RX ORDER — EPINEPHRINE 1 MG/ML
0.3 INJECTION, SOLUTION INTRAMUSCULAR; SUBCUTANEOUS EVERY 5 MIN PRN
Status: CANCELLED | OUTPATIENT
Start: 2020-06-08

## 2020-05-18 RX ORDER — ALBUTEROL SULFATE 0.83 MG/ML
2.5 SOLUTION RESPIRATORY (INHALATION)
Status: CANCELLED | OUTPATIENT
Start: 2020-07-20

## 2020-05-18 RX ORDER — ALBUTEROL SULFATE 0.83 MG/ML
2.5 SOLUTION RESPIRATORY (INHALATION)
Status: CANCELLED | OUTPATIENT
Start: 2020-06-08

## 2020-05-18 RX ORDER — HEPARIN SODIUM,PORCINE 10 UNIT/ML
5 VIAL (ML) INTRAVENOUS
Status: CANCELLED | OUTPATIENT
Start: 2020-06-29

## 2020-05-18 RX ORDER — ALBUTEROL SULFATE 90 UG/1
1-2 AEROSOL, METERED RESPIRATORY (INHALATION)
Status: CANCELLED
Start: 2020-06-08

## 2020-05-18 RX ORDER — EPINEPHRINE 1 MG/ML
0.3 INJECTION, SOLUTION INTRAMUSCULAR; SUBCUTANEOUS EVERY 5 MIN PRN
Status: CANCELLED | OUTPATIENT
Start: 2020-06-29

## 2020-05-18 RX ORDER — DIPHENHYDRAMINE HYDROCHLORIDE 50 MG/ML
50 INJECTION INTRAMUSCULAR; INTRAVENOUS
Status: CANCELLED
Start: 2020-07-20

## 2020-05-18 RX ORDER — HEPARIN SODIUM (PORCINE) LOCK FLUSH IV SOLN 100 UNIT/ML 100 UNIT/ML
5 SOLUTION INTRAVENOUS
Status: CANCELLED | OUTPATIENT
Start: 2020-07-20

## 2020-05-18 RX ORDER — HEPARIN SODIUM (PORCINE) LOCK FLUSH IV SOLN 100 UNIT/ML 100 UNIT/ML
5 SOLUTION INTRAVENOUS
Status: CANCELLED | OUTPATIENT
Start: 2020-06-08

## 2020-05-18 RX ORDER — DIPHENHYDRAMINE HYDROCHLORIDE 50 MG/ML
50 INJECTION INTRAMUSCULAR; INTRAVENOUS
Status: CANCELLED
Start: 2020-06-29

## 2020-05-18 RX ORDER — MEPERIDINE HYDROCHLORIDE 25 MG/ML
25 INJECTION INTRAMUSCULAR; INTRAVENOUS; SUBCUTANEOUS EVERY 30 MIN PRN
Status: CANCELLED | OUTPATIENT
Start: 2020-06-08

## 2020-05-18 RX ORDER — SODIUM CHLORIDE 9 MG/ML
1000 INJECTION, SOLUTION INTRAVENOUS CONTINUOUS PRN
Status: CANCELLED
Start: 2020-06-08

## 2020-05-19 NOTE — PROGRESS NOTES
Dear Dr. Dior:    I had the pleasure of seeing Norris Reyes in follow-up today at the [facility].     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  He had a PET scan performed in April 2020 demonstrating 1.7 x 1.1 x 3.2cm lesion on the left buccal mucosa along with hypermetabolic lymph nodes in multiple levels in the neck.  He was taken to the operating room on 4/23/2020 for wide local excision of the left buccal mucosa with marginal mandibulectomy and left neck dissection with Dr. Dior.  I performed a radial forearm free flap for reconstruction.  His postoperative course was complicated by hematoma of the cheek that required control in the operating room.  His final pathology demonstrated a 5.1cm moderately to poorly differentiated squamous cell carcinoma with focal sarcomatoid transformation, depth of invasion 1.2 cm, negative margins, involvement of the periosteum but negative bone, no lymphovascular invasion, no perineural invasion, 5/41 positive lymph nodes with largest deposit measuring 5.7 cm, GLADYS present (greater than 2 mm).  He has been recommended for postoperative chemoradiation.        Interval history:  He comes in today for follow-up.  He was last seen in clinic on 5/8/2020. He had started himself on a diet after discharge. He had a dehiscence of his flap at his last visit and was recommended to be NPO again. He has his CT simulation today. He had a virtual visit with medical oncology on 5/15/2020 with recommendation for high dose cisplatin. He is considering a PEG and has declined a port. He says that he is being compliant with his NPO status. He feels like his mouth opening is poor as he has not been stretching his jaw or  "eating. His pain is controlled. He is doing the arm dressing changes.       MEDICATIONS:     Current Outpatient Medications   Medication Sig Dispense Refill     acetaminophen (TYLENOL) 325 MG tablet 3 tablets (975 mg) by Oral or Feeding Tube route every 8 hours as needed for mild pain 60 tablet 1     aspirin (ASA) 325 MG tablet 1 tablet (325 mg) by Oral or Feeding Tube route daily 40 tablet 0     chlorhexidine (PERIDEX) 0.12 % solution Swish and spit 15 mLs in mouth 4 times daily 473 mL 1     diphenhydrAMINE (BENADRYL) 25 MG tablet Take 25 mg by mouth nightly as needed for sleep       fenofibrate (TRIGLIDE/LOFIBRA) 160 MG tablet Take 160 mg by mouth daily       Homeopathic Products (FRANKINCENSE UPLIFTING) OIL Apply topically 2 times daily To outside of cheek       LOSARTAN POTASSIUM PO Take 50 mg by mouth every morning        mineral oil-hydrophilic petrolatum (AQUAPHOR) external ointment Apply topically every 8 hours To neck incisions 396 g 1     multivitamins w/minerals (CERTAVITE) liquid 15 mLs by Per Feeding Tube route daily Take when on tube feeds 236 mL 1     order for DME Equipment being ordered: Wound care supplies, 1 each daily x 14 days  Xeroform occlusive gauze 5\" x 9\"  Telfa non-adherent pad 8\" x 3\"  Kerlix bandage roll 4-1/2\" x 4-1/8 yd  ACE wrap, 4 inch (4 total)    Diagnosis: oral cancer 14 days 1     order for DME Equipment being ordered: Nasogastric bolus tube feeding supplies  Formula: TwoCal HN, 5 cans per day  Gravity feeding bags  60 mL syringes  IV pole    Treatment Diagnosis: oral cancer 14 days 1     polyethylene glycol (MIRALAX) 17 g packet Take 17 g by mouth daily as needed for constipation 14 packet 0     protein modular (PROSOURCE TF) LIQD 1 packet by Per Feeding Tube route daily 14 packet 1       ALLERGIES:  No Known Allergies    HABITS/SOCIAL HISTORY:     Nonsmoker    Social History     Socioeconomic History     Marital status:      Spouse name: Not on file     Number of " children: Not on file     Years of education: Not on file     Highest education level: Not on file   Occupational History     Not on file   Social Needs     Financial resource strain: Not on file     Food insecurity     Worry: Not on file     Inability: Not on file     Transportation needs     Medical: Not on file     Non-medical: Not on file   Tobacco Use     Smoking status: Never Smoker     Smokeless tobacco: Never Used   Substance and Sexual Activity     Alcohol use: Yes     Comment: 1 drink a day but not currently     Drug use: Never     Sexual activity: Not Currently     Partners: Female     Birth control/protection: Male Surgical   Lifestyle     Physical activity     Days per week: Not on file     Minutes per session: Not on file     Stress: Not on file   Relationships     Social connections     Talks on phone: Not on file     Gets together: Not on file     Attends [employer]ec[employer]al service: Not on file     Active member of club or organization: Not on file     Attends meetings of clubs or organizations: Not on file     Relationship status: Not on file     Intimate partner violence     Fear of current or ex partner: Not on file     Emotionally abused: Not on file     Physically abused: Not on file     Forced sexual activity: Not on file   Other Topics Concern     Not on file   Social History Narrative     Not on file       PAST MEDICAL HISTORY:   Past Medical History:   Diagnosis Date     Autoimmune disease (H) 2/11/2020     Hypertension 2/11/2020    Losartan     Nephrolithiasis      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)         PAST SURGICAL HISTORY:   Past Surgical History:   Procedure Laterality Date     DISSECTION RADICAL NECK MODIFIED Left 4/23/2020    Procedure: left modified radical neck dissection;  Surgeon: Kameron Dior MD;  Location: [facility] OR     EXCISE LESION INTRAORAL Left 4/23/2020    Procedure: wide local excision of left buccal mucosa squamous cell carcinoma;  Surgeon: Kameron Dior MD;   Location: [facility] OR     EXCISE MASS  INTRAORAL Left 2020    Procedure: oral cavity exploration, incision and drainage oral cavity;  Surgeon: Opal Castañeda MD;  Location: [facility] OR     GRAFT FREE VASCULARIZED (LOCATION) Right 2020    Procedure: right forearm free flap;  Surgeon: Opal Castañeda MD;  Location: [facility] OR     GRAFT SKIN SPLIT THICKNESS FROM EXTREMITY Right 2020    Procedure: split thickness skin graft from right thigh;  Surgeon: Opal Castañeda MD;  Location: [facility] OR     ODONTECTOMY N/A 2020    Procedure: Dental Exam and Surgical Extraction Teeth #13, 14 and 15, NASOGASTRIC TUBE PLACEMENT;  Surgeon: Diana Diehl DDS;  Location: [facility] OR       FAMILY HISTORY:    Family History   Problem Relation Age of Onset     Heart Disease Brother              Thyroid Disease Sister      Hypertension Mother      No Known Problems Father        REVIEW OF SYSTEMS:  12 point ROS was negative other than the symptoms noted above in the HPI.  Patient Supplied Answers to Review of Systems  [facility] ROS 2020   Constitutional Weight gain   Neurology Numbness   Ears, Nose, Throat Ear pain   Gastrointestinal/Genitourinary Diarrhea   Hematologic -         PHYSICAL EXAMINATION:   /77   Pulse 79   Temp 98.8  F (37.1  C)   Resp 17   Ht 1.829 m (6')   Wt 88 kg (194 lb)   BMI 26.31 kg/m    Patient in no apparent distress  Breathing comfortably on room air  NG tube in place  Intraorally there is a healthy-appearing free flap along the left cheek.  The previous dehiscence of the left cheek along the forearm skin flap appears to have healed with no residual openings. Vicryl sutures are in place.  There is trismus present on exam.  The left neck incision is healing appropriately without any fluid collection or fluctuance.  No dehiscence.  The forearm donor site is healing appropriately.  The split-thickness skin graft demonstrates good take.  There is an area of fibrinous tissue present in the  area where previous seroma was drained.      RESULTS REVIEWED:       IMPRESSION AND PLAN:   Norris Reyes is a 59-year-old man with a history of a T4aN3b squamous cell carcinoma with sarcomatoid features of the left buccal mucosa.  He underwent surgical resection with free flap reconstruction on 4/23/2020.    He has been recommended for postoperative chemoradiation. He met with medical oncology this week. He has his CT simulation. He has elected against a port and PEG .    His dehiscence appears to have healed. He was cleared for a diet today - liquids, puree, then mechanical soft. His NG tube was removed. He had preradiation teaching with SLP today.    He can stop wearing the splint but should continue the arm dressing.     He can see Dr Dior 6 weeks after completion of treatment.    Thank you very much for the opportunity to participate in the care of your patient.      Opal Castañeda MD, M.D.  Otolaryngology- Head & Neck Surgery      This note was dictated with voice recognition software and then edited. Please excuse any unintentional errors.         CC:  Dwayne Kurtz MD  Department of Radiation Oncology  [facility]      Kameron Dior MD  Otolaryngology/Head & Neck Surgery  [facility] [address]      Myles Leonard DO  Department of Medical Oncology  [facility]

## 2020-05-20 ENCOUNTER — ALLIED HEALTH/NURSE VISIT (OUTPATIENT)
Dept: RADIATION ONCOLOGY | Facility: CLINIC | Age: 60
End: 2020-05-20
Attending: RADIOLOGY
Payer: COMMERCIAL

## 2020-05-20 ENCOUNTER — THERAPY VISIT (OUTPATIENT)
Dept: OTOLARYNGOLOGY | Facility: CLINIC | Age: 60
End: 2020-05-20
Payer: COMMERCIAL

## 2020-05-20 ENCOUNTER — OFFICE VISIT (OUTPATIENT)
Dept: OTOLARYNGOLOGY | Facility: CLINIC | Age: 60
End: 2020-05-20
Payer: COMMERCIAL

## 2020-05-20 VITALS
TEMPERATURE: 98.8 F | BODY MASS INDEX: 26.28 KG/M2 | SYSTOLIC BLOOD PRESSURE: 129 MMHG | HEART RATE: 79 BPM | HEIGHT: 72 IN | WEIGHT: 194 LBS | RESPIRATION RATE: 17 BRPM | DIASTOLIC BLOOD PRESSURE: 77 MMHG

## 2020-05-20 DIAGNOSIS — C06.0 SQUAMOUS CELL CANCER OF BUCCAL MUCOSA (H): ICD-10-CM

## 2020-05-20 DIAGNOSIS — R13.10 DYSPHAGIA: ICD-10-CM

## 2020-05-20 DIAGNOSIS — C06.0 SQUAMOUS CELL CANCER OF BUCCAL MUCOSA (H): Primary | ICD-10-CM

## 2020-05-20 DIAGNOSIS — R13.12 OROPHARYNGEAL DYSPHAGIA: Primary | ICD-10-CM

## 2020-05-20 PROCEDURE — 77334 RADIATION TREATMENT AID(S): CPT | Performed by: RADIOLOGY

## 2020-05-20 ASSESSMENT — MIFFLIN-ST. JEOR: SCORE: 1737.98

## 2020-05-20 ASSESSMENT — PAIN SCALES - GENERAL: PAINLEVEL: NO PAIN (0)

## 2020-05-20 NOTE — LETTER
5/20/2020       RE: Norris Reyes  [address]  [place] [facility] [place] [facility] [address]     Dear Colleague,    Thank you for referring your patient, Norris Reyes, to the [facility] EAR NOSE AND THROAT at [facility]. Please see a copy of my visit note below.    Dear Dr. Dior:    I had the pleasure of seeing Norris Reyes in follow-up today at the [facility].     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  He had a PET scan performed in April 2020 demonstrating 1.7 x 1.1 x 3.2cm lesion on the left buccal mucosa along with hypermetabolic lymph nodes in multiple levels in the neck.  He was taken to the operating room on 4/23/2020 for wide local excision of the left buccal mucosa with marginal mandibulectomy and left neck dissection with Dr. Dior.  I performed a radial forearm free flap for reconstruction.  His postoperative course was complicated by hematoma of the cheek that required control in the operating room.  His final pathology demonstrated a 5.1cm moderately to poorly differentiated squamous cell carcinoma with focal sarcomatoid transformation, depth of invasion 1.2 cm, negative margins, involvement of the periosteum but negative bone, no lymphovascular invasion, no perineural invasion, 5/41 positive lymph nodes with largest deposit measuring 5.7 cm, GLADYS present (greater than 2 mm).  He has been recommended for postoperative chemoradiation.        Interval history:  He comes in today for follow-up.  He was last seen in clinic on 5/8/2020. He had started himself on a diet after discharge. He had a dehiscence of his flap at his last visit and was recommended to be NPO again. He has his CT simulation today.  "He had a virtual visit with medical oncology on 5/15/2020 with recommendation for high dose cisplatin. He is considering a PEG and has declined a port. He says that he is being compliant with his NPO status. He feels like his mouth opening is poor as he has not been stretching his jaw or eating. His pain is controlled. He is doing the arm dressing changes.       MEDICATIONS:     Current Outpatient Medications   Medication Sig Dispense Refill     acetaminophen (TYLENOL) 325 MG tablet 3 tablets (975 mg) by Oral or Feeding Tube route every 8 hours as needed for mild pain 60 tablet 1     aspirin (ASA) 325 MG tablet 1 tablet (325 mg) by Oral or Feeding Tube route daily 40 tablet 0     chlorhexidine (PERIDEX) 0.12 % solution Swish and spit 15 mLs in mouth 4 times daily 473 mL 1     diphenhydrAMINE (BENADRYL) 25 MG tablet Take 25 mg by mouth nightly as needed for sleep       fenofibrate (TRIGLIDE/LOFIBRA) 160 MG tablet Take 160 mg by mouth daily       Homeopathic Products (FRANKINCENSE UPLIFTING) OIL Apply topically 2 times daily To outside of cheek       LOSARTAN POTASSIUM PO Take 50 mg by mouth every morning        mineral oil-hydrophilic petrolatum (AQUAPHOR) external ointment Apply topically every 8 hours To neck incisions 396 g 1     multivitamins w/minerals (CERTAVITE) liquid 15 mLs by Per Feeding Tube route daily Take when on tube feeds 236 mL 1     order for DME Equipment being ordered: Wound care supplies, 1 each daily x 14 days  Xeroform occlusive gauze 5\" x 9\"  Telfa non-adherent pad 8\" x 3\"  Kerlix bandage roll 4-1/2\" x 4-1/8 yd  ACE wrap, 4 inch (4 total)    Diagnosis: oral cancer 14 days 1     order for DME Equipment being ordered: Nasogastric bolus tube feeding supplies  Formula: TwoCal HN, 5 cans per day  Gravity feeding bags  60 mL syringes  IV pole    Treatment Diagnosis: oral cancer 14 days 1     polyethylene glycol (MIRALAX) 17 g packet Take 17 g by mouth daily as needed for constipation 14 packet 0     " protein modular (PROSOURCE TF) LIQD 1 packet by Per Feeding Tube route daily 14 packet 1       ALLERGIES:  No Known Allergies    HABITS/SOCIAL HISTORY:     Nonsmoker    Social History     Socioeconomic History     Marital status:      Spouse name: Not on file     Number of children: Not on file     Years of education: Not on file     Highest education level: Not on file   Occupational History     Not on file   Social Needs     Financial resource strain: Not on file     Food insecurity     Worry: Not on file     Inability: Not on file     Transportation needs     Medical: Not on file     Non-medical: Not on file   Tobacco Use     Smoking status: Never Smoker     Smokeless tobacco: Never Used   Substance and Sexual Activity     Alcohol use: Yes     Comment: 1 drink a day but not currently     Drug use: Never     Sexual activity: Not Currently     Partners: Female     Birth control/protection: Male Surgical   Lifestyle     Physical activity     Days per week: Not on file     Minutes per session: Not on file     Stress: Not on file   Relationships     Social connections     Talks on phone: Not on file     Gets together: Not on file     Attends Voodoo service: Not on file     Active member of club or organization: Not on file     Attends meetings of clubs or organizations: Not on file     Relationship status: Not on file     Intimate partner violence     Fear of current or ex partner: Not on file     Emotionally abused: Not on file     Physically abused: Not on file     Forced sexual activity: Not on file   Other Topics Concern     Not on file   Social History Narrative     Not on file       PAST MEDICAL HISTORY:   Past Medical History:   Diagnosis Date     Autoimmune disease (H) 2/11/2020     Hypertension 2/11/2020    Losartan     Nephrolithiasis      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)         PAST SURGICAL HISTORY:   Past Surgical History:   Procedure Laterality Date     DISSECTION RADICAL NECK  MODIFIED Left 2020    Procedure: left modified radical neck dissection;  Surgeon: Kameron Dior MD;  Location: [facility] OR     EXCISE LESION INTRAORAL Left 2020    Procedure: wide local excision of left buccal mucosa squamous cell carcinoma;  Surgeon: Kameron Dior MD;  Location: [facility] OR     EXCISE MASS  INTRAORAL Left 2020    Procedure: oral cavity exploration, incision and drainage oral cavity;  Surgeon: Opal Castañeda MD;  Location: [facility] OR     GRAFT FREE VASCULARIZED (LOCATION) Right 2020    Procedure: right forearm free flap;  Surgeon: Opal Castañeda MD;  Location: [facility] OR     GRAFT SKIN SPLIT THICKNESS FROM EXTREMITY Right 2020    Procedure: split thickness skin graft from right thigh;  Surgeon: Opal Castañeda MD;  Location: [facility] OR     ODONTECTOMY N/A 2020    Procedure: Dental Exam and Surgical Extraction Teeth #13, 14 and 15, NASOGASTRIC TUBE PLACEMENT;  Surgeon: Diana Diehl DDS;  Location: [facility] OR       FAMILY HISTORY:    Family History   Problem Relation Age of Onset     Heart Disease Brother              Thyroid Disease Sister      Hypertension Mother      No Known Problems Father        REVIEW OF SYSTEMS:  12 point ROS was negative other than the symptoms noted above in the HPI.  Patient Supplied Answers to Review of Systems  [facility] ROS 2020   Constitutional Weight gain   Neurology Numbness   Ears, Nose, Throat Ear pain   Gastrointestinal/Genitourinary Diarrhea   Hematologic -         PHYSICAL EXAMINATION:   /77   Pulse 79   Temp 98.8  F (37.1  C)   Resp 17   Ht 1.829 m (6')   Wt 88 kg (194 lb)   BMI 26.31 kg/m    Patient in no apparent distress  Breathing comfortably on room air  NG tube in place  Intraorally there is a healthy-appearing free flap along the left cheek.  The previous dehiscence of the left cheek along the forearm skin flap appears to have healed with no residual openings. Vicryl sutures are in place.  There is trismus  present on exam.  The left neck incision is healing appropriately without any fluid collection or fluctuance.  No dehiscence.  The forearm donor site is healing appropriately.  The split-thickness skin graft demonstrates good take.  There is an area of fibrinous tissue present in the area where previous seroma was drained.      RESULTS REVIEWED:       IMPRESSION AND PLAN:   Norris Reyes is a 59-year-old man with a history of a T4aN3b squamous cell carcinoma with sarcomatoid features of the left buccal mucosa.  He underwent surgical resection with free flap reconstruction on 4/23/2020.    He has been recommended for postoperative chemoradiation. He met with medical oncology this week. He has his CT simulation. He has elected against a port and PEG .    His dehiscence appears to have healed. He was cleared for a diet today - liquids, puree, then mechanical soft. His NG tube was removed. He had preradiation teaching with SLP today.    He can stop wearing the splint but should continue the arm dressing.     He can see Dr Dior 6 weeks after completion of treatment.    Thank you very much for the opportunity to participate in the care of your patient.      Opal Castañeda MD, M.D.  Otolaryngology- Head & Neck Surgery      This note was dictated with voice recognition software and then edited. Please excuse any unintentional errors.         CC:  Dwayne Kurtz MD  Department of Radiation Oncology  [facility]      Kameron Dior MD  Otolaryngology/Head & Neck Surgery  [facility] [address]      Mylse Leonard DO  Department of Medical Oncology  [facility]      Again, thank you for allowing me to participate in the care of your patient.      Sincerely,    Opal Castañeda MD

## 2020-05-20 NOTE — PROGRESS NOTES
Radiation Therapy Patient Education    Person involved with teaching: Patient    Patient educational needs for self management of treatment-related side effects assessment completed.  Psychiatric Patient Ed tab contains Patient Learning Assessment    Education Materials Given  Radiation Therapy and You and Radiation Therapy for Head and Neck    Educational Topics Discussed  Side effects expected, Pain management, Skin care, Nutrition and weight loss and When to call MD/RN    Response To Teaching  Verbalizes understanding    GYN Only  Vaginal Dilator-given and educated: N/A    Referrals sent: Dental, Speech and Swallowing and Nutrition    Chemotherapy? Yes, notified medical oncology of start date 06/08/20

## 2020-05-20 NOTE — PROGRESS NOTES
05/20/20 1400   General Information   Type Of Visit Initial   Start Of Care Date 05/20/20   Referring Physician Dr. 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  He underwent a wide local excision of the left buccal mucosa with marginal mandibulectomy and left neck dissection on 4/23/2020.  He is recommended to undergo postop chemoradiation.  Unfortunately he started himself on a diet and had a dehiscence of his flap was recommended to be n.p.o.  After MD evaluation today it was deemed he was appropriate to start a diet.  He is seen in conjunction with ENT clinic visit per MD request.  Patient has been n.p.o. with NJ tube in place.  Reports that he did take a few sips of water yesterday and did not notice any difficulty.  He would very much like to have his feeding tube out.   Respiratory Status Room air   Prior Level Of Function Swallowing   Prior Level Of Function Comment N.p.o. with NJ tube since surgery   General Observations Patient is alert, pleasant and cooperative.   Patient/family Goals To initiate p.o.   Clinical Swallow Evaluation   Oral Musculature anomalies present   Structural Abnormalities present   Dentition present and adequate   Mucosal Quality adequate   Mandibular Strength and Mobility impaired   Oral Labial Strength and Mobility other (see comments)  (mild left sided weakness)   Lingual Strength and Mobility impaired coordination;other (see comments)  (overall mild general weakness and decreased coordination)   Velar Elevation intact   Buccal Strength and Mobility intact   Laryngeal Function  Cough;Swallow;Voicing initiated   Oral Musculature Comments Area of wide local excision and flap present on left buccal mucosa.  Trismus present.  Patient able to get about 1 finger in.  Mild left-sided oral labial weakness.  Mild decreased lingual strength and mobility.  Otherwise within functional limits.   Additional Documentation Yes   Clinical Swallow Eval: Thin Liquid Texture Trial   Mode of Presentation, Thin Liquids cup;self-fed   Volume of Liquid or Food Presented 4 ounces of water via single and serial sips   Oral Phase of Swallow WFL   Pharyngeal Phase of Swallow intact   Diagnostic Statement No overt signs or symptoms of penetration or aspiration.  Patient benefited from placing cup on the right side of his mouth due to mild left-sided oral labial weakness.   Swallow Compensations   Swallow Compensations   (Place cup on right side of the mouth)   Results No difficulties noted   Educational Assessment   Barriers to Learning No barriers   General Therapy Interventions   Planned Therapy Interventions Dysphagia Treatment   Dysphagia treatment Oropharyngeal exercise training;Instruction of safe swallow strategies;Modified diet education;Compensatory strategies for swallowing   Swallow Eval: Clinical Impressions   Skilled Criteria for Therapy Intervention Skilled criteria met.  Treatment indicated.   Functional Assessment Scale (FAS) 4   Treatment Diagnosis Moderate oropharyngeal dysphagia expected to worsen to moderately severe during radiation treatment   Diet texture recommendations Full liquid  (Start full liquid and advance as tolerated/outlined to DD2)   Recommended Feeding/Eating Techniques alternate between small bites and sips of food/liquid;check mouth frequently for oral residue/pocketing;maintain upright posture during/after eating for 30 mins;small sips/bites  (Place cups and utensils on the right side of the mouth)   Rehab Potential good, to achieve stated therapy goals   Therapy Frequency other  (see comments)  (2x/month for 6 months)   Predicted Duration of Therapy Intervention (days/wks) 6 months   Anticipated Discharge Disposition home w/ outpatient services   Risks and Benefits of Treatment have been explained. Yes   Patient, family and/or staff in agreement with Plan of Care Yes   Clinical Impression Comments Norris Troy presents today with moderate oropharyngeal dysphagia characterized by trismus. Oral motor examination reveals area of wide local excision and flap present on left buccal mucosa.  Trismus present.  Patient able to get about 1 finger in.  Mild left-sided oral labial weakness.  Mild decreased lingual strength and mobility.  Otherwise within functional limits.  Patient assessed with single and serial sips of thin liquids with no overt signs or symptoms of penetration or aspiration.  Patient benefited from placing the cup on the right side of his mouth due to mild left sided oral labial weakness.  After consultation and approval from MD.  This clinician pulled NJ tube with no complications.  Trained patient to start on a full liquid diet for the next 3 days.  If tolerating well after 3 days patient can transition to DD1 with thin liquids for an additional 3 days.  If continue tolerance patient may finally advance to DD2 with thin liquids.  Patient will benefit from ongoing SLP services to train safe and compensatory swallowing strategies, ensure diet tolerance and guide diet advancement, train side effects of radiation on swallowing, and train oropharyngeal and jaw strengthening and range of motion exercises given trismus observed today on examination and risk of radiation-induced fibrosis.   Swallow Goals   SLP Swallow Goals 1;2   Swallow Goal 1   Goal Identifier Diet   Goal Description 1.  Patient will tolerate soft solids and thin liquids with no overt signs or symptoms of penetration or aspiration in 100% of p.o. trials.   Target Date 08/18/20   Swallow Goal 2   Goal Identifier  Exercises   Goal Description 2.  Patient will complete 10 repetitions of 5/5 oropharyngeal and jaw strengthening and range of motion exercises with minimal written and verbal cues.   Target Date 08/18/20   Total Session Time   SLP Eval: oral/pharyngeal swallow function, clinical minutes (31860) 10   Total Evaluation Time 10     SAME DAY TREATMENT     05/20/20 1400   Signing Clinician's Name / Credentials   Signing clinician's name /credentials Leticia Charles MA, CFY-SLP   Session Number   Session Number 1   Subjective Report   Subjective Report Please see evaluation for full subjective report   Objective Measures   Objective Measures Objective Measure 1   Objective Measure 1   Objective Measure Jaw ROM   Details ~1 finger    Swallow Goals   SLP Swallow Goals 1;2   Swallow Goal 1   Goal Identifier Diet   Goal Description 1.  Patient will tolerate soft solids and thin liquids with no overt signs or symptoms of penetration or aspiration in 100% of p.o. trials.   Target Date 08/18/20   Swallow Goal 2   Goal Identifier Exercises   Goal Description 2.  Patient will complete 10 repetitions of 5/5 oropharyngeal and jaw strengthening and range of motion exercises with minimal written and verbal cues.   Target Date 08/18/20   Treatment Interventions    Treatment Interventions Treatment Swallow/Oral dysfunction   Treatment Swallow/Oral dysfunction   Treatment of Swallowing Dysfunction &/or Oral Function for Feeding Minutes (13951) 12 Minutes   Skilled Intervention Provided written and verbal information on diet modifications.;Educated patient on swallowing strategies.;Educated patient on risks of aspiration;Demonstrated safe swallow strategies;Cued swallowing strategies (auditory, visual, tactile);Assessed oral intake trials;Other  (Trained exercises and side effects of xrt on swallowing)   Patient Response Patient verbalized and demonstrated understanding of material provided   Treatment Detail 1.  Trained patient on results  of clinical swallow evaluation and diet recommendations.  Trained patient to initiate full liquid diet for the next 3 days.  If tolerating well can advance to a DD1 with thin liquids for an additional 3 days.  If tolerating can then finally advance to DD2 with thin liquids.  Trained patient on safe and compensatory swallowing strategies: Small sips, small bites, alternating solids and liquids, using a gentle swish and swallow for feeling of oral stasis, remaining upright during and for 30 minutes after p.o. intake, and placing cup/utensils in the right side of the mouth due to mild left-sided oral labial weakness.  Patient trained on signs and symptoms of aspiration to watch for and was trained to contact the clinic if he notices these happening with p.o. intake.  Patient given written instructions on diet advancement guidelines and a handout for each type of diet to guide p.o. selections.  Trained patient on side effects of radiation on swallowing including mucositis, dysgeusia, and xerostomia.  Trained patient on strategies for each aspect.  Handout given for each aspect. 2.  Patient trained on short and long-term side effects of radiation on swallowing including risk of radiation induced fibrosis.  Trained patient on 5/5 oropharyngeal and jaw strengthening and range of motion exercises.  Of note, trained patient to complete a very gentle jaw stretch given trismus observed on examination today.  Patient was able to demonstrate 5/5 exercises with minimal cues.  Handout given.   Assessments Completed   Assessments Completed Clinical swallow evaluation   Education   Learner Patient   Readiness Acceptance   Method Booklet/handout;Explanation;Demonstration   Response Verbalizes understanding;Demonstrates understanding   Communication with other professionals   Communication with other professionals Updated MD   Plan   Home program Advance diet as outlined (full liquids for 3 days, then DD1 with thin liquids for 3 days,  finally advancing to DD2 with thin liquids); begin home exercise program   Plan for next session f/u every other week during XRT in person for dysphagia therapy to reasses PO intake/tolerance and home exercise program completion   Total Session Time   Total Treatment Time (sum of timed and untimed services) 22   AMBULATORY CLINICS ONLY-MEDICAL AND TREATMENT DIAGNOSIS   Medical Diagnosis Left buccal mucosa SCC   Treatment Diagnosis Moderate oropharyngeal dysphagia expected to worsen to moderately severe during radiation treatment     Thank you for the referral of Norris Reyes. If you have any questions about this report, please contact me using the information below.     MAHNAZ Leija (beatris), M.A., CFY-SLP  Speech Language Pathology Clinical Fellow  [facility] Trained Vocologist   [facility]   [phone]  [email or web address]

## 2020-05-26 ENCOUNTER — TELEPHONE (OUTPATIENT)
Dept: SPEECH THERAPY | Facility: CLINIC | Age: 60
End: 2020-05-26

## 2020-05-26 NOTE — TELEPHONE ENCOUNTER
Called patient and left a VM with dates/times for 4x swallow treatment sessions, as requested by Leticia PADILLA.    Provided [facility] number for patient to call back and reschedule if needed.

## 2020-06-03 ENCOUNTER — APPOINTMENT (OUTPATIENT)
Dept: RADIATION ONCOLOGY | Facility: CLINIC | Age: 60
End: 2020-06-03
Attending: RADIOLOGY
Payer: COMMERCIAL

## 2020-06-03 PROCEDURE — 77386 ZZH IMRT TREATMENT DELIVERY, COMPLEX: CPT | Performed by: RADIOLOGY

## 2020-06-03 PROCEDURE — 77332 RADIATION TREATMENT AID(S): CPT | Mod: XU | Performed by: RADIOLOGY

## 2020-06-04 ENCOUNTER — APPOINTMENT (OUTPATIENT)
Dept: RADIATION ONCOLOGY | Facility: CLINIC | Age: 60
End: 2020-06-04
Attending: RADIOLOGY
Payer: COMMERCIAL

## 2020-06-04 VITALS — DIASTOLIC BLOOD PRESSURE: 83 MMHG | SYSTOLIC BLOOD PRESSURE: 155 MMHG | HEART RATE: 66 BPM

## 2020-06-04 DIAGNOSIS — C06.0 SQUAMOUS CELL CANCER OF BUCCAL MUCOSA (H): Primary | ICD-10-CM

## 2020-06-04 PROCEDURE — 77386 ZZH IMRT TREATMENT DELIVERY, COMPLEX: CPT | Performed by: RADIOLOGY

## 2020-06-04 NOTE — PROGRESS NOTES
"Norris Reyes is a 59 year old male who is being evaluated via a billable video visit.      The patient has been notified of following:     \"This video visit will be conducted via a call between you and your physician/provider. We have found that certain health care needs can be provided without the need for an in-person physical exam.  This service lets us provide the care you need with a video conversation.  If a prescription is necessary we can send it directly to your pharmacy.  If lab work is needed we can place an order for that and you can then stop by our lab to have the test done at a later time.    Video visits are billed at different rates depending on your insurance coverage.  Please reach out to your insurance provider with any questions.    If during the course of the call the physician/provider feels a video visit is not appropriate, you will not be charged for this service.\"    Patient has given verbal consent for Video visit? Yes    How would you like to obtain your AVS? [employer]    Video-Visit Details    Type of service:  Video Visit    Originating Location (pt. 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 Current dose of 400 cGy. Tolerating treatment well.    ED visits/Hospitalizations:  None    Missed Treatments:  None    Subjective: Mr. Reyes presents to clinic today for his " weekly on-treatment visit. He began adjuvant radiotherapy yesterday and has tolerated his first two fractions without incident. He is scheduled to receive his first chemotherapy infusion this coming Monday, 6/8/2020.    ROS:   Constitutional  Pain (0-10): 0  Fatigue: None    CNS  Headaches: None    ENT  Mucositis: None    Cardiopulmonary  Dyspnea: None    GI  Nausea/vomiting: None    Nutrition  PEG: No  Diet: Soft    Integumentary  Dermatitis: None    Objective:   Starting weight: 88 kg    BP: 155/83 (sitting), 145/75 (standing)  Pulse: 66 (sitting), 71 (standing)    General: 59 year old gentleman seated comfortably in an examination chair in [employer]  HEENT: NC/AT. EOMI. No rhinorrhea or epistaxis.  Pulmonary: No wheezing, stridor or respiratory distress.  Skin: No erythema.    Treatment-related toxicities (CTCAE v5.0):  None    Assessment:    Mr. 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 He is receiving adjuvant chemoradiotherapy for improved locoregional disease control. He has tolerated the initiation of radiotherapy very well.    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

## 2020-06-05 ENCOUNTER — PATIENT OUTREACH (OUTPATIENT)
Dept: ONCOLOGY | Facility: CLINIC | Age: 60
End: 2020-06-05

## 2020-06-05 ENCOUNTER — APPOINTMENT (OUTPATIENT)
Dept: RADIATION ONCOLOGY | Facility: CLINIC | Age: 60
End: 2020-06-05
Attending: RADIOLOGY
Payer: COMMERCIAL

## 2020-06-05 PROCEDURE — 77386 ZZH IMRT TREATMENT DELIVERY, COMPLEX: CPT | Performed by: RADIOLOGY

## 2020-06-08 ENCOUNTER — APPOINTMENT (OUTPATIENT)
Dept: LAB | Facility: CLINIC | Age: 60
End: 2020-06-08
Attending: INTERNAL MEDICINE
Payer: COMMERCIAL

## 2020-06-08 ENCOUNTER — INFUSION THERAPY VISIT (OUTPATIENT)
Dept: ONCOLOGY | Facility: CLINIC | Age: 60
End: 2020-06-08
Attending: INTERNAL MEDICINE
Payer: COMMERCIAL

## 2020-06-08 ENCOUNTER — APPOINTMENT (OUTPATIENT)
Dept: RADIATION ONCOLOGY | Facility: CLINIC | Age: 60
End: 2020-06-08
Attending: RADIOLOGY
Payer: COMMERCIAL

## 2020-06-08 VITALS
RESPIRATION RATE: 16 BRPM | OXYGEN SATURATION: 98 % | WEIGHT: 190 LBS | SYSTOLIC BLOOD PRESSURE: 132 MMHG | TEMPERATURE: 98.8 F | BODY MASS INDEX: 25.77 KG/M2 | HEART RATE: 69 BPM | DIASTOLIC BLOOD PRESSURE: 76 MMHG

## 2020-06-08 DIAGNOSIS — C06.0 SQUAMOUS CELL CANCER OF BUCCAL MUCOSA (H): Primary | ICD-10-CM

## 2020-06-08 DIAGNOSIS — I10 HYPERTENSION, UNSPECIFIED TYPE: ICD-10-CM

## 2020-06-08 LAB
ALBUMIN SERPL-MCNC: 3.8 G/DL (ref 3.4–5)
ALP SERPL-CCNC: 69 U/L (ref 40–150)
ALT SERPL W P-5'-P-CCNC: 24 U/L (ref 0–70)
ANION GAP SERPL CALCULATED.3IONS-SCNC: 5 MMOL/L (ref 3–14)
AST SERPL W P-5'-P-CCNC: 12 U/L (ref 0–45)
BASOPHILS # BLD AUTO: 0 10E9/L (ref 0–0.2)
BASOPHILS NFR BLD AUTO: 0 %
BILIRUB SERPL-MCNC: 0.5 MG/DL (ref 0.2–1.3)
BUN SERPL-MCNC: 12 MG/DL (ref 7–30)
CALCIUM SERPL-MCNC: 9 MG/DL (ref 8.5–10.1)
CHLORIDE SERPL-SCNC: 104 MMOL/L (ref 94–109)
CO2 SERPL-SCNC: 28 MMOL/L (ref 20–32)
CREAT SERPL-MCNC: 1.08 MG/DL (ref 0.66–1.25)
DIFFERENTIAL METHOD BLD: ABNORMAL
EOSINOPHIL # BLD AUTO: 0.1 10E9/L (ref 0–0.7)
EOSINOPHIL NFR BLD AUTO: 1.9 %
ERYTHROCYTE [DISTWIDTH] IN BLOOD BY AUTOMATED COUNT: 12.6 % (ref 10–15)
GFR SERPL CREATININE-BSD FRML MDRD: 75 ML/MIN/{1.73_M2}
GLUCOSE SERPL-MCNC: 118 MG/DL (ref 70–99)
HCT VFR BLD AUTO: 39 % (ref 40–53)
HGB BLD-MCNC: 12.7 G/DL (ref 13.3–17.7)
IMM GRANULOCYTES # BLD: 0 10E9/L (ref 0–0.4)
IMM GRANULOCYTES NFR BLD: 0.3 %
LYMPHOCYTES # BLD AUTO: 0.7 10E9/L (ref 0.8–5.3)
LYMPHOCYTES NFR BLD AUTO: 21.8 %
MAGNESIUM SERPL-MCNC: 2.3 MG/DL (ref 1.6–2.3)
MCH RBC QN AUTO: 28.9 PG (ref 26.5–33)
MCHC RBC AUTO-ENTMCNC: 32.6 G/DL (ref 31.5–36.5)
MCV RBC AUTO: 89 FL (ref 78–100)
MONOCYTES # BLD AUTO: 0.3 10E9/L (ref 0–1.3)
MONOCYTES NFR BLD AUTO: 10.1 %
NEUTROPHILS # BLD AUTO: 2.1 10E9/L (ref 1.6–8.3)
NEUTROPHILS NFR BLD AUTO: 65.9 %
NRBC # BLD AUTO: 0 10*3/UL
NRBC BLD AUTO-RTO: 0 /100
PLATELET # BLD AUTO: 150 10E9/L (ref 150–450)
POTASSIUM SERPL-SCNC: 3.8 MMOL/L (ref 3.4–5.3)
PROT SERPL-MCNC: 7.1 G/DL (ref 6.8–8.8)
RBC # BLD AUTO: 4.4 10E12/L (ref 4.4–5.9)
SODIUM SERPL-SCNC: 137 MMOL/L (ref 133–144)
WBC # BLD AUTO: 3.2 10E9/L (ref 4–11)

## 2020-06-08 PROCEDURE — 85025 COMPLETE CBC W/AUTO DIFF WBC: CPT | Performed by: INTERNAL MEDICINE

## 2020-06-08 PROCEDURE — 96415 CHEMO IV INFUSION ADDL HR: CPT

## 2020-06-08 PROCEDURE — 25000128 H RX IP 250 OP 636: Mod: ZF | Performed by: INTERNAL MEDICINE

## 2020-06-08 PROCEDURE — 99214 OFFICE O/P EST MOD 30 MIN: CPT | Mod: ZP | Performed by: NURSE PRACTITIONER

## 2020-06-08 PROCEDURE — 77386 ZZH IMRT TREATMENT DELIVERY, COMPLEX: CPT | Performed by: RADIOLOGY

## 2020-06-08 PROCEDURE — 80053 COMPREHEN METABOLIC PANEL: CPT | Performed by: INTERNAL MEDICINE

## 2020-06-08 PROCEDURE — 25800030 ZZH RX IP 258 OP 636: Mod: ZF | Performed by: INTERNAL MEDICINE

## 2020-06-08 PROCEDURE — 96361 HYDRATE IV INFUSION ADD-ON: CPT

## 2020-06-08 PROCEDURE — 96413 CHEMO IV INFUSION 1 HR: CPT

## 2020-06-08 PROCEDURE — 96366 THER/PROPH/DIAG IV INF ADDON: CPT

## 2020-06-08 PROCEDURE — 96367 TX/PROPH/DG ADDL SEQ IV INF: CPT

## 2020-06-08 PROCEDURE — 96375 TX/PRO/DX INJ NEW DRUG ADDON: CPT

## 2020-06-08 PROCEDURE — 83735 ASSAY OF MAGNESIUM: CPT | Performed by: INTERNAL MEDICINE

## 2020-06-08 RX ORDER — LORAZEPAM 0.5 MG/1
0.5 TABLET ORAL EVERY 4 HOURS PRN
Qty: 30 TABLET | Refills: 2 | Status: SHIPPED | OUTPATIENT
Start: 2020-06-08 | End: 2020-07-24

## 2020-06-08 RX ORDER — PALONOSETRON 0.05 MG/ML
0.25 INJECTION, SOLUTION INTRAVENOUS ONCE
Status: COMPLETED | OUTPATIENT
Start: 2020-06-08 | End: 2020-06-08

## 2020-06-08 RX ORDER — PROCHLORPERAZINE MALEATE 10 MG
10 TABLET ORAL EVERY 6 HOURS PRN
Qty: 30 TABLET | Refills: 2 | Status: SHIPPED | OUTPATIENT
Start: 2020-06-08 | End: 2020-07-24

## 2020-06-08 RX ORDER — DEXAMETHASONE 4 MG/1
8 TABLET ORAL
Qty: 6 TABLET | Refills: 2 | Status: SHIPPED | OUTPATIENT
Start: 2020-06-09 | End: 2020-06-29

## 2020-06-08 RX ADMIN — PALONOSETRON 0.25 MG: 0.05 INJECTION, SOLUTION INTRAVENOUS at 09:04

## 2020-06-08 RX ADMIN — SODIUM CHLORIDE 1000 ML: 9 INJECTION, SOLUTION INTRAVENOUS at 08:15

## 2020-06-08 RX ADMIN — CISPLATIN 220 MG: 1 INJECTION, SOLUTION INTRAVENOUS at 09:46

## 2020-06-08 RX ADMIN — DEXAMETHASONE SODIUM PHOSPHATE: 10 INJECTION, SOLUTION INTRAMUSCULAR; INTRAVENOUS at 09:07

## 2020-06-08 RX ADMIN — MAGNESIUM SULFATE HEPTAHYDRATE: 500 INJECTION, SOLUTION INTRAMUSCULAR; INTRAVENOUS at 10:28

## 2020-06-08 ASSESSMENT — PAIN SCALES - GENERAL: PAINLEVEL: NO PAIN (0)

## 2020-06-08 NOTE — NURSING NOTE
Chief Complaint   Patient presents with     Blood Draw     Labs draw via PIV placed by RN in lab. VS taken.      Carmen Cooper RN,

## 2020-06-08 NOTE — PROGRESS NOTES
Infusion Nursing Note:  Norris RAINES Derekconchita presents today for Cycle 1 Day 1 Cisplatin.    Patient seen by provider today: Yes: Stephanie Hernandez NP   present during visit today: Not Applicable.    Note: Patient is new to the infusion room today and is receiving Cisplatin for the first time.  Patient oriented to infusion room, location of bathrooms and nutrition stations, and call light.  Verified that patient recieved written chemotherapy information previously.  Verbally reviewed chemotherapy teaching, side effects, take-home medications, and follow-up schedule with patient. Patient instructed to call triage with any questions or if he experiences a temperature >100.5, shaking chills, uncontrolled nausea/vomiting/diarrhea, dizziness, shortness of breath, bleeding not relieved with pressure, or with any other concerns.  Instructed patient to call the after hours nurse line or [phone] on nights/weekends/holidays.    Intravenous Access:  Peripheral IV placed in lab today.    Treatment Conditions:  Lab Results   Component Value Date    HGB 12.7 06/08/2020     Lab Results   Component Value Date    WBC 3.2 06/08/2020      Lab Results   Component Value Date    ANEU 2.1 06/08/2020     Lab Results   Component Value Date     06/08/2020      Lab Results   Component Value Date     06/08/2020                   Lab Results   Component Value Date    POTASSIUM 3.8 06/08/2020           Lab Results   Component Value Date    MAG 2.3 06/08/2020            Lab Results   Component Value Date    CR 1.08 06/08/2020                   Lab Results   Component Value Date    OLGA LIDIA 9.0 06/08/2020                Lab Results   Component Value Date    BILITOTAL 0.5 06/08/2020           Lab Results   Component Value Date    ALBUMIN 3.8 06/08/2020                    Lab Results   Component Value Date    ALT 24 06/08/2020           Lab Results   Component Value Date    AST 12 06/08/2020       Results reviewed, labs MET treatment  parameters, ok to proceed with treatment.    Post Infusion Assessment:  Patient tolerated infusion without incident.  Patient voided pre, during, and post Cisplatin infusion.  Blood return noted pre and post infusion.  Site patent and intact, free from redness, edema or discomfort.  No evidence of extravasations.  Access discontinued per protocol.     Discharge Plan:   Prescription refills given for Ativan, Compazine, and Decadron.  Discharge instructions reviewed with: Patient.  Patient and/or family verbalized understanding of discharge instructions and all questions answered.  Copy of AVS reviewed with patient and/or family.  Patient will return 6/29 for next appointment.  Patient discharged in stable condition accompanied by: self.  Departure Mode: Ambulatory.  Face to Face time: 0.    CAROLINE MONAHAN RN

## 2020-06-08 NOTE — PATIENT INSTRUCTIONS
Contact Numbers    [facility] Main Line (for Scheduling/Triage/After Hours Nurse Line): [phone]    Please call the [facility] nurse triage or the after hours nurse line if you experience a temperature greater than or equal to 100.5, shaking chills, have uncontrolled nausea, vomiting and/or diarrhea, dizziness, lightheadedness, shortness of breath, chest pain, bleeding, unexplained bruising, or if you have any other new/concerning symptoms, questions or concerns.     If you are having any concerning symptoms or wish to speak to a provider before your next infusion visit, please call your care coordinator or triage to notify them so we can adequately serve you.     If you need a refill on a narcotic prescription or other medication, please call triage before your infusion appointment.       June 2020 Sunday Monday Tuesday Wednesday Thursday Friday Saturday        1    UMP TREATMENT PLAN VISIT   7:00 AM   (15 min.)   Dwayne Kurtz MD   Radiation Oncology Clinic 2     3    UMP EXTERNAL RADIATION TREATMT  11:45 AM   (30 min.)   UMP RAD ONC [facility]   Radiation Oncology Clinic 4    UMP EXTERNAL RADIATION TREATMT  10:15 AM   (30 min.)   UMP RAD ONC [facility]   Radiation Oncology [facility]    UMP ON TREATMENT VISIT  10:45 AM   (15 min.)   Dwayne Kurtz MD   Rad[facility] Oncology Clinic 5    UMP EXTERNAL RADIATION TREATMT  10:15 AM   (30 min.)   UMP RAD ONC [facility]   Radiation Oncology Clinic 6       7     8    [facility] LAB DRAW   6:45 AM   (15 min.)    [facility] LAB DRAW   [facility] Lab Draw    UMP RETURN   6:45 AM   (50 min.)   Stephanie Hernandez APRN CNP   [facility]    UMP ONC INFUSION 360   8:30 AM   (360 min.)   [facility]   [facility]    UMP EXTERNAL RADIATION TREATMT   2:30 PM   (30 min.)   UMP RAD ONC [facility]   Radiation Oncology [facility] 9    UMP EXTERNAL RADIATION TREATMT  10:30 AM   (30 min.)   P RAD ONC [facility]   Radiation Oncology [facility]  10    UMP EXTERNAL RADIATION TREATMT  10:30 AM   (30 min.)   P RAD ONC VARIAN   Radiation Oncology Clinic    TELEPHONE VISIT NEW   2:00 PM   (30 min.)   Grace Healy RD   [facility] 11    UMP EXTERNAL RADIATION TREATMT  10:30 AM   (30 min.)   UMP RAD ONC VARIAN   Radiation Oncology Clinic    [facility] ON TREATMENT VISIT  11:00 AM   (15 min.)   Dwayne Kurtz MD   Radiation Oncology Clinic 12    UMP EXTERNAL RADIATION TREATMT  10:30 AM   (30 min.)   P RAD ONC [facility]   Radiation Oncology Clinic 13       14     15    UMP EXTERNAL RADIATION TREATMT  10:30 AM   (30 min.)   P RAD ONC [facility]   Radiation Oncology Clinic    TELEPHONE VISIT NEW  12:00 PM   (30 min.)   Grace Healy RD   [facility] [place] 16    TELEPHONE VISIT RETURN   7:50 AM   (50 min.)   Nicholas Anderson PA   [facility]    LAB  10:00 AM   (15 min.)   [facility] LAB GOLD WAITING   [facility]  10:30 AM   (30 min.)   P RAD ONC [facility]   Radiation Oncology Clinic 17    SWALLOW TREATMENT   9:15 AM   (30 min.)   Mayte Duran SLP   [facility]    P EXTERNAL RADIATION TREATMT  10:30 AM   (30 min.)   P RAD ONC [facility]   Radiation Oncology Clinic 18    UMP EXTERNAL RADIATION TREATMT  10:30 AM   (30 min.)   P RAD ONC VARIAN   Radiation Oncology Clinic    [facility] ON TREATMENT VISIT  11:00 AM   (15 min.)   Dwayne Kurtz MD   Radiation Oncology Clinic 19    UMP EXTERNAL RADIATION TREATMT  10:30 AM   (30 min.)   P RAD ONC [facility]   Radiation Oncology Clinic 20       21     22    LAB   6:30 AM   (15 min.)   [facility]    UMP RETURN   6:45 AM   (50 min.)   Jane Valencia PA-C   [facility]    UMP EXTERNAL RADIATION TREATMT  10:30 AM   (30 min.)   P RAD ONC [facility]   Radiation Oncology Clinic 23    UMP EXTERNAL RADIATION TREATMT  10:30 AM   (30 min.)   P RAD ONC [facility]    Radiation Oncology Clinic 24    UMP EXTERNAL RADIATION TREATMT  10:30 AM   (30 min.)   UMP RAD ONC VARIAN   Radiation Oncology Clinic 25    UMP EXTERNAL RADIATION TREATMT  10:30 AM   (30 min.)   UMP RAD ONC VARIAN   Radiation Oncology Clinic    [facility] ON TREATMENT VISIT  11:00 AM   (15 min.)   Dwayne Kurtz MD   Radiation Oncology Clinic 26    UMP EXTERNAL RADIATION TREATMT  10:30 AM   (30 min.)   UMP RAD ONC [facility]   Radiation Oncology Clinic 27       28     29    [facility] LAB DRAW   7:15 AM   (15 min.)    [facility] LAB DRAW   [facility] Lab Draw    UMP RETURN   7:35 AM   (50 min.)   Stephanie Hernandez S, APRN CNP   [facility]    UMP ONC INFUSION 360   9:00 AM   (360 min.)    29 ATC   [facility] Infusion    UMP EXTERNAL RADIATION TREATMT   2:00 PM   (30 min.)   UMP RAD ONC [facility]   Radiation Oncology Clinic 30    UMP EXTERNAL RADIATION TREATMT  10:30 AM   (30 min.)   UMP RAD ONC VARIAN   Radiation Oncology Clinic                                 July 2020 Sunday Monday Tuesday Wednesday Thursday Friday Saturday                  1    SWALLOW TREATMENT   9:15 AM   (30 min.)   Ling Scanlon, SLP   [facility]    UMP EXTERNAL RADIATION TREATMT  10:30 AM   (30 min.)   UMP RAD ONC VARIAN   Radiation Oncology Clinic 2    UMP EXTERNAL RADIATION TREATMT  10:30 AM   (30 min.)   UMP RAD ONC VARIAN   Radiation Oncology Clinic    UMP ON TREATMENT VISIT  11:00 AM   (15 min.)   Dwayne Kurtz MD   Radiation Oncology Clinic 3    UMP EXTERNAL RADIATION TREATMT  10:30 AM   (30 min.)   UMP RAD ONC VARIAN   Radiation Oncology Clinic 4       5     6    UMP EXTERNAL RADIATION TREATMT  10:30 AM   (30 min.)   UMP RAD ONC VARIAN   Radiation Oncology Clinic 7    UMP EXTERNAL RADIATION TREATMT  10:30 AM   (30 min.)   UMP RAD ONC VARIAN   Radiation Oncology Clinic 8    UMP EXTERNAL RADIATION TREATMT  10:30 AM   (30 min.)   UMP RAD ONC VARIAN   Radiation  Oncology Clinic 9    UMP EXTERNAL RADIATION TREATMT  10:30 AM   (30 min.)   P RAD ONC VARIAN   Radiation Oncology Clinic    [facility] ON TREATMENT VISIT  11:00 AM   (15 min.)   Dwayne Kurtz MD   Radiation Oncology Clinic 10    UMP EXTERNAL RADIATION TREATMT  10:30 AM   (30 min.)   UMP RAD ONC VARIAN   Radiation Oncology Clinic 11       12     13    UMP EXTERNAL RADIATION TREATMT  10:30 AM   (30 min.)   UMP RAD ONC VARIAN   Radiation Oncology Clinic 14    UMP EXTERNAL RADIATION TREATMT  10:30 AM   (30 min.)   P RAD ONC VARIAN   Radiation Oncology Clinic 15    SWALLOW TREATMENT   9:15 AM   (30 min.)   Ling Scanlon [facility]    UMP EXTERNAL RADIATION TREATMT  10:30 AM   (30 min.)   P RAD ONC VARIAN   Radiation Oncology Clinic 16    UMP EXTERNAL RADIATION TREATMT  10:30 AM   (30 min.)   P RAD ONC [facility]   Radiation Oncology Clinic 17    UMP EXTERNAL RADIATION TREATMT  10:30 AM   (30 min.)   P RAD ONC [facility]   Radiation Oncology Clinic 18       19     20     21     22     23     24     25       26     27     28     29    SWALLOW TREATMENT   9:15 AM   (30 min.)   [facility]     31                         Recent Results (from the past 24 hour(s))   CBC with platelets differential    Collection Time: 06/08/20  6:48 AM   Result Value Ref Range    WBC 3.2 (L) 4.0 - 11.0 10e9/L    RBC Count 4.40 4.4 - 5.9 10e12/L    Hemoglobin 12.7 (L) 13.3 - 17.7 g/dL    Hematocrit 39.0 (L) 40.0 - 53.0 %    MCV 89 78 - 100 fl    MCH 28.9 26.5 - 33.0 pg    MCHC 32.6 31.5 - 36.5 g/dL    RDW 12.6 10.0 - 15.0 %    Platelet Count 150 150 - 450 10e9/L    Diff Method Automated Method     % Neutrophils 65.9 %    % Lymphocytes 21.8 %    % Monocytes 10.1 %    % Eosinophils 1.9 %    % Basophils 0.0 %    % Immature Granulocytes 0.3 %    Nucleated RBCs 0 0 /100    Absolute Neutrophil 2.1 1.6 - 8.3 10e9/L    Absolute Lymphocytes 0.7 (L) 0.8 - 5.3 10e9/L    Absolute Monocytes 0.3 0.0 - 1.3  10e9/L    Absolute Eosinophils 0.1 0.0 - 0.7 10e9/L    Absolute Basophils 0.0 0.0 - 0.2 10e9/L    Abs Immature Granulocytes 0.0 0 - 0.4 10e9/L    Absolute Nucleated RBC 0.0    Comprehensive metabolic panel    Collection Time: 06/08/20  6:48 AM   Result Value Ref Range    Sodium 137 133 - 144 mmol/L    Potassium 3.8 3.4 - 5.3 mmol/L    Chloride 104 94 - 109 mmol/L    Carbon Dioxide 28 20 - 32 mmol/L    Anion Gap 5 3 - 14 mmol/L    Glucose 118 (H) 70 - 99 mg/dL    Urea Nitrogen 12 7 - 30 mg/dL    Creatinine 1.08 0.66 - 1.25 mg/dL    GFR Estimate 75 >60 mL/min/[1.73_m2]    GFR Estimate If Black 87 >60 mL/min/[1.73_m2]    Calcium 9.0 8.5 - 10.1 mg/dL    Bilirubin Total 0.5 0.2 - 1.3 mg/dL    Albumin 3.8 3.4 - 5.0 g/dL    Protein Total 7.1 6.8 - 8.8 g/dL    Alkaline Phosphatase 69 40 - 150 U/L    ALT 24 0 - 70 U/L    AST 12 0 - 45 U/L   Magnesium    Collection Time: 06/08/20  6:48 AM   Result Value Ref Range    Magnesium 2.3 1.6 - 2.3 mg/dL

## 2020-06-08 NOTE — NURSING NOTE
Oncology Rooming Note    June 8, 2020 7:04 AM   Norris Reyes is a 59 year old male who presents for:    Chief Complaint   Patient presents with     Blood Draw     Labs draw via PIV placed by RN in lab. VS taken.      Oncology Clinic Visit     Squamous cell cancer of buccal mucosa     Initial Vitals: /76 (BP Location: Right arm, Patient Position: Sitting, Cuff Size: Adult Regular)   Pulse 69   Temp 98.8  F (37.1  C) (Oral)   Resp 16   Wt 86.2 kg (190 lb)   SpO2 98%   BMI 25.77 kg/m   Estimated body mass index is 25.77 kg/m  as calculated from the following:    Height as of 5/20/20: 1.829 m (6').    Weight as of this encounter: 86.2 kg (190 lb). Body surface area is 2.09 meters squared.  No Pain (0) Comment: Data Unavailable   No LMP for male patient.  Allergies reviewed: Yes  Medications reviewed: Yes    Medications: Medication refills not needed today.  Pharmacy name entered into [employer]: [facility] [address] [place] [address]    Clinical concerns: States that he has not been taking his losartan has it was making him dizzy. He stopped taking it daily x 2 weeks ago and will randomly take it. States he doesn't think he needs it as blood pressures have been good.        Kya Riddle, CMA

## 2020-06-08 NOTE — LETTER
6/8/2020         RE: Norris Reyes  [address]  [facility] [address]        Dear Colleague,    Thank you for referring your patient, Norris Reyes, to the [facility]. Please see a copy of my visit note below.    Reason for Visit: f/u of SCC of the L buccal mucosa    Oncology HPI: Norris Reyes is a 59 year old man with a PMH of leukoplakia, HTN, HLD. 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 He underwent resection with L neck dissection and R radial freeflap graft on 4/23/20.  His course was complicated by wound bleeding in his mouth. He had complications of wound dehiscence in the L buccal mucosa after oral feeding. He is now NPO and receiving tube feeds.  He met with Dr. Kurtz and Dr. Leonard and is planning to initiated concurrent chemoradiation. He started radiation on 6/3 and is starting HD cisplatin today.      Interval history: Norris is feeling well. Is a little anxious about starting chemotherapy. Is not eager to experience the side effects. Is healing well from surgery. No oral bleeding. Needs to vigorously rinse the mouth after meals as he tends to pocket food. Is able to eat soft foods and drinks smoothies and juices. Is gaining weight. He started radiation last week. Since then, has noted a tender lump under the chin, in the beard.     No fevers/chills, cough, sob, cp, palpitation, headache ,vision change, hearing loss or tinnitus, neuropathy. Bowel and bladder function are wnl. Mood is good.    Current Outpatient Medications   Medication Sig Dispense Refill     acetaminophen (TYLENOL) 325 MG tablet 3 tablets (975 mg) by Oral or Feeding Tube route every 8 hours as needed for mild pain 60 tablet 1     aspirin (ASA) 325 MG tablet 1 tablet (325 mg) by Oral or Feeding Tube route daily 40 tablet 0     chlorhexidine (PERIDEX) 0.12 % solution Swish and spit 15 mLs in mouth 4 times daily 473 mL 1     diphenhydrAMINE (BENADRYL) 25 MG  "tablet Take 25 mg by mouth nightly as needed for sleep       fenofibrate (TRIGLIDE/LOFIBRA) 160 MG tablet Take 160 mg by mouth daily       Homeopathic Products (FRANKINCENSE UPLIFTING) OIL Apply topically 2 times daily To outside of cheek       LOSARTAN POTASSIUM PO Take 50 mg by mouth every morning        mineral oil-hydrophilic petrolatum (AQUAPHOR) external ointment Apply topically every 8 hours To neck incisions 396 g 1     multivitamins w/minerals (CERTAVITE) liquid 15 mLs by Per Feeding Tube route daily Take when on tube feeds 236 mL 1     order for DME Equipment being ordered: Wound care supplies, 1 each daily x 14 days  Xeroform occlusive gauze 5\" x 9\"  Telfa non-adherent pad 8\" x 3\"  Kerlix bandage roll 4-1/2\" x 4-1/8 yd  ACE wrap, 4 inch (4 total)    Diagnosis: oral cancer 14 days 1     order for DME Equipment being ordered: Nasogastric bolus tube feeding supplies  Formula: TwoCal HN, 5 cans per day  Gravity feeding bags  60 mL syringes  IV pole    Treatment Diagnosis: oral cancer 14 days 1     polyethylene glycol (MIRALAX) 17 g packet Take 17 g by mouth daily as needed for constipation 14 packet 0     protein modular (PROSOURCE TF) LIQD 1 packet by Per Feeding Tube route daily 14 packet 1        No Known Allergies      Exam: alert, oriented Blood pressure 132/76, pulse 69, temperature 98.8  F (37.1  C), temperature source Oral, resp. rate 16, weight 86.2 kg (190 lb), SpO2 98 %.  Wt Readings from Last 4 Encounters:   06/08/20 86.2 kg (190 lb)   05/20/20 88 kg (194 lb)   05/08/20 86.6 kg (191 lb)   04/27/20 92.8 kg (204 lb 9.4 oz)     Oropharynx is moist with resection changes. Radial flap noted. Moderate to severe trismus noted.  Neck is firm on the L with dissection changes. There is a 1 cm tender lump under the chin without increased erythema or pustule. Lungs:CTA. Heart: RRR, no murmur or rub. Abdomen: soft, nontender, BS active, no masses or organomegaly.  Extremities: warm, no edema. Speech is clear. " CN wnl. Gait/station wnl.       Labs: Results for MONSE MANZANARES (MRN [id number]) as of 6/8/2020 07:41   Ref. Range 6/8/2020 06:48   Sodium Latest Ref Range: 133 - 144 mmol/L 137   Potassium Latest Ref Range: 3.4 - 5.3 mmol/L 3.8   Chloride Latest Ref Range: 94 - 109 mmol/L 104   Carbon Dioxide Latest Ref Range: 20 - 32 mmol/L 28   Urea Nitrogen Latest Ref Range: 7 - 30 mg/dL 12   Creatinine Latest Ref Range: 0.66 - 1.25 mg/dL 1.08   GFR Estimate Latest Ref Range: >60 mL/min/1.73_m2 75   GFR Estimate If Black Latest Ref Range: >60 mL/min/1.73_m2 87   Calcium Latest Ref Range: 8.5 - 10.1 mg/dL 9.0   Anion Gap Latest Ref Range: 3 - 14 mmol/L 5   Magnesium Latest Ref Range: 1.6 - 2.3 mg/dL 2.3   Albumin Latest Ref Range: 3.4 - 5.0 g/dL 3.8   Protein Total Latest Ref Range: 6.8 - 8.8 g/dL 7.1   Bilirubin Total Latest Ref Range: 0.2 - 1.3 mg/dL 0.5   Alkaline Phosphatase Latest Ref Range: 40 - 150 U/L 69   ALT Latest Ref Range: 0 - 70 U/L 24   AST Latest Ref Range: 0 - 45 U/L 12   Glucose Latest Ref Range: 70 - 99 mg/dL 118 (H)   WBC Latest Ref Range: 4.0 - 11.0 10e9/L 3.2 (L)   Hemoglobin Latest Ref Range: 13.3 - 17.7 g/dL 12.7 (L)   Hematocrit Latest Ref Range: 40.0 - 53.0 % 39.0 (L)   Platelet Count Latest Ref Range: 150 - 450 10e9/L 150   RBC Count Latest Ref Range: 4.4 - 5.9 10e12/L 4.40   MCV Latest Ref Range: 78 - 100 fl 89   MCH Latest Ref Range: 26.5 - 33.0 pg 28.9   MCHC Latest Ref Range: 31.5 - 36.5 g/dL 32.6   RDW Latest Ref Range: 10.0 - 15.0 % 12.6   Diff Method Unknown Automated Method   % Neutrophils Latest Units: % 65.9   % Lymphocytes Latest Units: % 21.8   % Monocytes Latest Units: % 10.1   % Eosinophils Latest Units: % 1.9   % Basophils Latest Units: % 0.0   % Immature Granulocytes Latest Units: % 0.3   Nucleated RBCs Latest Ref Range: 0 /100 0   Absolute Neutrophil Latest Ref Range: 1.6 - 8.3 10e9/L 2.1   Absolute Lymphocytes Latest Ref Range: 0.8 - 5.3 10e9/L 0.7 (L)   Absolute Monocytes Latest  Ref Range: 0.0 - 1.3 10e9/L 0.3   Absolute Eosinophils Latest Ref Range: 0.0 - 0.7 10e9/L 0.1   Absolute Basophils Latest Ref Range: 0.0 - 0.2 10e9/L 0.0   Abs Immature Granulocytes Latest Ref Range: 0 - 0.4 10e9/L 0.0   Absolute Nucleated RBC Unknown 0.0       Imaging: n/a    Impression/plan:   SCC of the buccal mucosa, stage NY (cT2, cN2b, cM0), s/p resection of the oral cavity with L neck dissection, R radial free flap reconstruction  -reviewed the plan for concurrent chemoradiation with HD cisplatin on days 1,22, 43. He started with radiation on 6/3/20  -reviewed side effects and management strategies including N/V, kidney injury, need for aggressive antiemetic use and hydration, risk of hearing loss, neuropathy, cytopenias, mucositis, fatigue, radiation dermatitis. Reviewed importance of oral hygiene, oral exercises and follow-through with SLP and ENT  -he is driving from [place] daily, which I think will be very difficult as we progress through treatment. He is staying at a campground this evening. May need to consider whether he needs to stay closer in the upcoming days  -will see weekly through treatment.    Submental nodule:  I'm not sure what to make of the lump under the chin. Feels tender, like an ingrown hair, but I don't see any pustule or erythema. Will monitor. Also sent a message to Dr. Kurtz to review later this week.    Trismus: moderate to severe post surgery  -he is working on the SLP exercises and doesn't feel he is making progress with ROM.    FEN:   -was NG tube dependent after surgery due to wound dehiscence and bleeding post-op  -met with Dr. Castañeda on 5/20/20 and was cleared for a liquid, pureed, then mechanical soft diet. His NG tube was removed  -eating well, getting in smoothies, juicing, soft foods gaining weight. He would like to avoid a port/peg placement  -stressed the importance of pushing hydration. Currently getting in 3 quarts of water a day    HTN  -holding losartan if he  feels dizzy. Has a BP cuff at home. If SBP is above 130, he will take the losartan. Reviewed that is not uncommon that we would end up holding this for hypotension during treatment. Will nafisa. BP stable today      Again, thank you for allowing me to participate in the care of your patient.        Sincerely,        HOMER Espinosa CNP

## 2020-06-08 NOTE — PROGRESS NOTES
Reason for Visit: f/u of SCC of the L buccal mucosa    Oncology HPI: Norris Reyes is a 59 year old man with a PMH of leukoplakia, HTN, HLD. 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 He underwent resection with L neck dissection and R radial freeflap graft on 4/23/20.  His course was complicated by wound bleeding in his mouth. He had complications of wound dehiscence in the L buccal mucosa after oral feeding. He is now NPO and receiving tube feeds.  He met with Dr. Kurtz and Dr. Leonard and is planning to initiated concurrent chemoradiation. He started radiation on 6/3 and is starting HD cisplatin today.      Interval history: Norris is feeling well. Is a little anxious about starting chemotherapy. Is not eager to experience the side effects. Is healing well from surgery. No oral bleeding. Needs to vigorously rinse the mouth after meals as he tends to pocket food. Is able to eat soft foods and drinks smoothies and juices. Is gaining weight. He started radiation last week. Since then, has noted a tender lump under the chin, in the beard.     No fevers/chills, cough, sob, cp, palpitation, headache ,vision change, hearing loss or tinnitus, neuropathy. Bowel and bladder function are wnl. Mood is good.    Current Outpatient Medications   Medication Sig Dispense Refill     acetaminophen (TYLENOL) 325 MG tablet 3 tablets (975 mg) by Oral or Feeding Tube route every 8 hours as needed for mild pain 60 tablet 1     aspirin (ASA) 325 MG tablet 1 tablet (325 mg) by Oral or Feeding Tube route daily 40 tablet 0     chlorhexidine (PERIDEX) 0.12 % solution Swish and spit 15 mLs in mouth 4 times daily 473 mL 1     diphenhydrAMINE (BENADRYL) 25 MG tablet Take 25 mg by mouth nightly as needed for sleep       fenofibrate (TRIGLIDE/LOFIBRA) 160 MG tablet Take 160 mg by mouth daily       Homeopathic Products (FRANKINCENSE UPLIFTING) OIL Apply topically 2 times daily To outside of cheek        "LOSARTAN POTASSIUM PO Take 50 mg by mouth every morning        mineral oil-hydrophilic petrolatum (AQUAPHOR) external ointment Apply topically every 8 hours To neck incisions 396 g 1     multivitamins w/minerals (CERTAVITE) liquid 15 mLs by Per Feeding Tube route daily Take when on tube feeds 236 mL 1     order for DME Equipment being ordered: Wound care supplies, 1 each daily x 14 days  Xeroform occlusive gauze 5\" x 9\"  Telfa non-adherent pad 8\" x 3\"  Kerlix bandage roll 4-1/2\" x 4-1/8 yd  ACE wrap, 4 inch (4 total)    Diagnosis: oral cancer 14 days 1     order for DME Equipment being ordered: Nasogastric bolus tube feeding supplies  Formula: TwoCal HN, 5 cans per day  Gravity feeding bags  60 mL syringes  IV pole    Treatment Diagnosis: oral cancer 14 days 1     polyethylene glycol (MIRALAX) 17 g packet Take 17 g by mouth daily as needed for constipation 14 packet 0     protein modular (PROSOURCE TF) LIQD 1 packet by Per Feeding Tube route daily 14 packet 1        No Known Allergies      Exam: alert, oriented Blood pressure 132/76, pulse 69, temperature 98.8  F (37.1  C), temperature source Oral, resp. rate 16, weight 86.2 kg (190 lb), SpO2 98 %.  Wt Readings from Last 4 Encounters:   06/08/20 86.2 kg (190 lb)   05/20/20 88 kg (194 lb)   05/08/20 86.6 kg (191 lb)   04/27/20 92.8 kg (204 lb 9.4 oz)     Oropharynx is moist with resection changes. Radial flap noted. Moderate to severe trismus noted.  Neck is firm on the L with dissection changes. There is a 1 cm tender lump under the chin without increased erythema or pustule. Lungs:CTA. Heart: RRR, no murmur or rub. Abdomen: soft, nontender, BS active, no masses or organomegaly.  Extremities: warm, no edema. Speech is clear. CN wnl. Gait/station wnl.       Labs: Results for MONSE MANZANARES (MRN [id number]) as of 6/8/2020 07:41   Ref. Range 6/8/2020 06:48   Sodium Latest Ref Range: 133 - 144 mmol/L 137   Potassium Latest Ref Range: 3.4 - 5.3 mmol/L 3.8   Chloride " Latest Ref Range: 94 - 109 mmol/L 104   Carbon Dioxide Latest Ref Range: 20 - 32 mmol/L 28   Urea Nitrogen Latest Ref Range: 7 - 30 mg/dL 12   Creatinine Latest Ref Range: 0.66 - 1.25 mg/dL 1.08   GFR Estimate Latest Ref Range: >60 mL/min/1.73_m2 75   GFR Estimate If Black Latest Ref Range: >60 mL/min/1.73_m2 87   Calcium Latest Ref Range: 8.5 - 10.1 mg/dL 9.0   Anion Gap Latest Ref Range: 3 - 14 mmol/L 5   Magnesium Latest Ref Range: 1.6 - 2.3 mg/dL 2.3   Albumin Latest Ref Range: 3.4 - 5.0 g/dL 3.8   Protein Total Latest Ref Range: 6.8 - 8.8 g/dL 7.1   Bilirubin Total Latest Ref Range: 0.2 - 1.3 mg/dL 0.5   Alkaline Phosphatase Latest Ref Range: 40 - 150 U/L 69   ALT Latest Ref Range: 0 - 70 U/L 24   AST Latest Ref Range: 0 - 45 U/L 12   Glucose Latest Ref Range: 70 - 99 mg/dL 118 (H)   WBC Latest Ref Range: 4.0 - 11.0 10e9/L 3.2 (L)   Hemoglobin Latest Ref Range: 13.3 - 17.7 g/dL 12.7 (L)   Hematocrit Latest Ref Range: 40.0 - 53.0 % 39.0 (L)   Platelet Count Latest Ref Range: 150 - 450 10e9/L 150   RBC Count Latest Ref Range: 4.4 - 5.9 10e12/L 4.40   MCV Latest Ref Range: 78 - 100 fl 89   MCH Latest Ref Range: 26.5 - 33.0 pg 28.9   MCHC Latest Ref Range: 31.5 - 36.5 g/dL 32.6   RDW Latest Ref Range: 10.0 - 15.0 % 12.6   Diff Method Unknown Automated Method   % Neutrophils Latest Units: % 65.9   % Lymphocytes Latest Units: % 21.8   % Monocytes Latest Units: % 10.1   % Eosinophils Latest Units: % 1.9   % Basophils Latest Units: % 0.0   % Immature Granulocytes Latest Units: % 0.3   Nucleated RBCs Latest Ref Range: 0 /100 0   Absolute Neutrophil Latest Ref Range: 1.6 - 8.3 10e9/L 2.1   Absolute Lymphocytes Latest Ref Range: 0.8 - 5.3 10e9/L 0.7 (L)   Absolute Monocytes Latest Ref Range: 0.0 - 1.3 10e9/L 0.3   Absolute Eosinophils Latest Ref Range: 0.0 - 0.7 10e9/L 0.1   Absolute Basophils Latest Ref Range: 0.0 - 0.2 10e9/L 0.0   Abs Immature Granulocytes Latest Ref Range: 0 - 0.4 10e9/L 0.0   Absolute Nucleated RBC  Unknown 0.0       Imaging: n/a    Impression/plan:   SCC of the buccal mucosa, stage NY (cT2, cN2b, cM0), s/p resection of the oral cavity with L neck dissection, R radial free flap reconstruction  -reviewed the plan for concurrent chemoradiation with HD cisplatin on days 1,22, 43. He started with radiation on 6/3/20  -reviewed side effects and management strategies including N/V, kidney injury, need for aggressive antiemetic use and hydration, risk of hearing loss, neuropathy, cytopenias, mucositis, fatigue, radiation dermatitis. Reviewed importance of oral hygiene, oral exercises and follow-through with SLP and ENT  -he is driving from [place] daily, which I think will be very difficult as we progress through treatment. He is staying at a campground this evening. May need to consider whether he needs to stay closer in the upcoming days  -will see weekly through treatment.    Submental nodule:  I'm not sure what to make of the lump under the chin. Feels tender, like an ingrown hair, but I don't see any pustule or erythema. Will monitor. Also sent a message to Dr. Kurtz to review later this week.    Trismus: moderate to severe post surgery  -he is working on the SLP exercises and doesn't feel he is making progress with ROM.    FEN:   -was NG tube dependent after surgery due to wound dehiscence and bleeding post-op  -met with Dr. Castañeda on 5/20/20 and was cleared for a liquid, pureed, then mechanical soft diet. His NG tube was removed  -eating well, getting in smoothies, juicing, soft foods gaining weight. He would like to avoid a port/peg placement  -stressed the importance of pushing hydration. Currently getting in 3 quarts of water a day    HTN  -holding losartan if he feels dizzy. Has a BP cuff at home. If SBP is above 130, he will take the losartan. Reviewed that is not uncommon that we would end up holding this for hypotension during treatment. Will nafisa. BP stable today

## 2020-06-09 ENCOUNTER — APPOINTMENT (OUTPATIENT)
Dept: RADIATION ONCOLOGY | Facility: CLINIC | Age: 60
End: 2020-06-09
Attending: RADIOLOGY
Payer: COMMERCIAL

## 2020-06-09 PROCEDURE — 77336 RADIATION PHYSICS CONSULT: CPT | Performed by: RADIOLOGY

## 2020-06-09 PROCEDURE — 77386 ZZH IMRT TREATMENT DELIVERY, COMPLEX: CPT | Performed by: RADIOLOGY

## 2020-06-10 ENCOUNTER — VIRTUAL VISIT (OUTPATIENT)
Dept: ONCOLOGY | Facility: CLINIC | Age: 60
End: 2020-06-10
Attending: RADIOLOGY
Payer: COMMERCIAL

## 2020-06-10 ENCOUNTER — APPOINTMENT (OUTPATIENT)
Dept: RADIATION ONCOLOGY | Facility: CLINIC | Age: 60
End: 2020-06-10
Attending: RADIOLOGY
Payer: COMMERCIAL

## 2020-06-10 DIAGNOSIS — C06.9 ORAL CANCER (H): Primary | ICD-10-CM

## 2020-06-10 PROCEDURE — 40000114 ZZH STATISTIC NO CHARGE CLINIC VISIT

## 2020-06-10 PROCEDURE — 77386 ZZH IMRT TREATMENT DELIVERY, COMPLEX: CPT | Performed by: RADIOLOGY

## 2020-06-10 NOTE — LETTER
6/10/2020         RE: Norris Reyes  [address]  [place] [facility] [address]        Dear Colleague,    Thank you for referring your patient, Norris Reyes, to the [facility]. Please see a copy of my visit note below.    CLINICAL NUTRITION SERVICES - ASSESSMENT NOTE    Norris Reyes 59 year old referred for MNT related to buccal mucosa cancer    Time Spent: <10 minutes (no charge - patient has not verified insurance)  Visit Type: phone  Referring Physician: Jerardo    Nutrition Prescription   Recommendations Suggested by Registered Dietitian (RD):   1. 5-6 small frequent meals daily  2. 2600kcal, 100g protein, >10 cups non-caffeine containing beverages daily  3. Healios - 2 servings/day (mix powder with water as directed)   Malnutrition: does not meet criteria at this time     NUTRITION HISTORY  Factors affecting nutrition intake include:constipation, nausea and swallowing difficulties  Current diet: soft foods, some solids  Current appetite/intake: good  PEG Tube: No (would like to try an avoid; had NG removed after surgery on 4/28)  Chemotherapy: HD Cisplatin (started 6/8)  Radiation: ~5/33 fractions completed        Norris tells me that he has been eating well, mostly focused on soft foods and liquids but able to take on some solid foods such as pizza.  He has been drinking at least 3 quarts of water/day and Deejay Feng tea.    He has been struggling with constipation since he had chemo and is looking for suggestions for this.    He is pleased that he has been able to gain a couple pounds with his current diet regimen as he understands eating will become much more difficult.  He is very focused on trying to avoid a feeding tube.     Diet Recall  Breakfast Home made smoothie with yogurt, protein powder, peanut butter, fruit   Lunch Chicken noodle soup (cream based), juicing    Dinner Pizza, yogurt, casseroles   Snacks Yogurt, pudding, juicing smoothies   Beverages Water and tea  "    ANTHROPOMETRICS  Height: 6'1\"  Weight: 190 lbs/86kg  BMI: 26  Weight Status:  Overweight BMI 25-29.9  IBW: 178 lbs (106%)  Weight History: stable (no weight gain noted per electronic records)  Wt Readings from Last 10 Encounters:   06/08/20 86.2 kg (190 lb)   05/20/20 88 kg (194 lb)   05/08/20 86.6 kg (191 lb)   04/27/20 92.8 kg (204 lb 9.4 oz)   04/15/20 90.7 kg (200 lb)   04/08/20 91.2 kg (201 lb)     Dosing Weight: 86kg    Medications/vitamins/minerals/herbals:   Reviewed    Labs:   Labs reviewed    NUTRITION FOCUSED PHYSICAL ASSESSMENT FOR DIAGNOSING MALNUTRITION:  Observed:  Not observed due to Covid 19 Pandemic (phone call only)    ASSESSED NUTRITION NEEDS:  Estimated Energy Needs: [id number] kcals (30-35 Kcal/Kg)  Justification: increased needs with CRT  Estimated Protein Needs: 103-130 grams protein (1.2-1.5 g pro/Kg)  Justification: increased needs with CRT  Estimated Fluid Needs: >3000  mL   Justification: increased needs with Cisplatin (risk for nephrotoxicity)    MALNUTRITION:  % Weight Loss:  None noted  % Intake:  Decreased intake does not meet criteria for malnutrition   Subcutaneous Fat Loss:  None observed  Muscle Loss:  None observed  Fluid Retention:  None noted    Malnutrition Diagnosis: Patient does not meet two of the above criteria necessary for diagnosing malnutrition but is at risk    NUTRITION DIAGNOSIS:  Predicted inadequate nutrient intake related to cancer treatment to head/neck region    INTERVENTIONS  Provided written & verbal education:   - Discussed ways to maximize and fortify foods with calories and protein via small frequent meals.    - Advised pt to aim for at least >2600kcal and >105g protein daily.   - Reviewed common barriers to eating with treatment and as treatment progresses. Discussed ways to cope with constipation. Suggested he try Smooth Move tea, prune juice and/or pear juice.  Reviewed coping with mucositis.  Suggested he try Healios (which he ordered while on phone " with RD).   - Reviewed ONS options (Mass Pro weight uhi, Ensure Enlive, Ensure Plus/Boost Plus, CIB, Benecalorie etc). Encouraged utilizing these ONS in home made shakes/smoothies to prevent flavor fatigue.  Encouraged pt to start consuming 2 ONS or home made shakes/smoothies daily.       Pt verbalize understanding of materials provided during consult.   Patient Understanding: Excellent  Expected Compliance: Excellent    Goals  1.  Aim for 5-6 small frequent meals  2.  Aim for 2600kcal and 105g protein/day  3. Weight maintenance       Follow-Up Plans: Pt has RD contact information for questions.    Pt to follow up with RD in 1-2 weeks at the time of treatment.     MONITORING AND EVALUATION:  -Food intake  -Fluid/beverage intake  -Liquid meal replacement or supplement  -Weight trends    Grace Xiao RDN, LD        Again, thank you for allowing me to participate in the care of your patient.        Sincerely,        Grace Xiao RD

## 2020-06-11 ENCOUNTER — APPOINTMENT (OUTPATIENT)
Dept: RADIATION ONCOLOGY | Facility: CLINIC | Age: 60
End: 2020-06-11
Attending: RADIOLOGY
Payer: COMMERCIAL

## 2020-06-11 VITALS
BODY MASS INDEX: 26.45 KG/M2 | HEART RATE: 77 BPM | DIASTOLIC BLOOD PRESSURE: 80 MMHG | SYSTOLIC BLOOD PRESSURE: 162 MMHG | WEIGHT: 195 LBS

## 2020-06-11 DIAGNOSIS — C06.0 SQUAMOUS CELL CANCER OF BUCCAL MUCOSA (H): Primary | ICD-10-CM

## 2020-06-11 PROCEDURE — 77386 ZZH IMRT TREATMENT DELIVERY, COMPLEX: CPT | Performed by: RADIOLOGY

## 2020-06-11 NOTE — Clinical Note
2020         RE: Norris Reyes  [address]  [place] [facility] [place] [address]        Dear Colleague,    Thank you for referring your patient, Norris Reyes, to the RADIATION ONCOLOGY CLINIC. Please see a copy of my visit note below.    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 Current dose of 400 cGy. Tolerating treatment well.    2020: Weekly RT visit. Current dose of 1400 cGy. Presents with new submental nodularity.    ED visits/Hospitalizations:  None    Missed Treatments:  None    Subjective: Mr. Reyes presents to clinic today for his weekly on-treatment visit. He is tolerating treatment well and has no pressing concerns or complaints related to his ongoing chemoradiotherapy. He does have two new submental nodules which he reports first appeared within the last week after initiation of therapy.    ROS:   Constitutional  Pain (0-10): 0  Fatigue: None    CNS  Headaches: None    ENT  Mucositis: None    Cardiopulmonary  Dyspnea: None    GI  Nausea/vomiting: None    Nutrition  PEG: No  Diet: Soft    Integumentary  Dermatitis: None    Objective:   Current weight: 88.5 kg  Last week's weight: 88 kg  Starting weight: 88 kg    BP: 162/80 (sitting), 160/79 (standing)  Pulse: 77 (sitting), 84 (standing)     General: 59 year old gentleman seated comfortably in an examination chair in [facility]  HEENT: NC/AT. EOMI. No rhinorrhea or epistaxis. Moist mucus membranes. Healthy-appearing left buccal free flap.   Neck: There are  two firm and fixed masses within the submental region. The larger measures approximately 1.5-2 cm within the midline submental region while a second measuring approximately 1 cm is palpated more inferior and laterally located just superior to the incision from the prior neck dissection.  Pulmonary: No wheezing, stridor or respiratory distress.  Skin: No erythema.    Treatment-related toxicities (CTCAE v5.0):  None    Assessment:    Mr. 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 He is receiving adjuvant chemoradiotherapy for improved locoregional disease control. He presents with new palpable nodules located within IA.    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:    Continue chemoradiotherapy    US neck tomorrow (6/12) prior to treatment with FNA if suspicious for leelee disease      Pain management:    Continue acetaminophen for mild to moderate symptoms    Fluids/Electrolytes/Nutrition:    Continue ongoing follow-up with oncology dietician for evaluation and cares throughout treatment    Dermatitis:    Continue daily moisturizer use to the skin of the left face and neck    Mosaiq chart and setup information reviewed  IGRT images reviewed    Medication list reviewed    Dwayne Kurtz MD/PhD    Dept of Radiation Oncology  [facility]     Again, thank you for allowing me to participate in the care of your patient.        Sincerely,        Dwayne Kurtz MD

## 2020-06-11 NOTE — PROGRESS NOTES
"CLINICAL NUTRITION SERVICES - ASSESSMENT NOTE    Norris Reyes 59 year old referred for MNT related to buccal mucosa cancer    Time Spent: <10 minutes (no charge - patient has not verified insurance)  Visit Type: phone  Referring Physician: Jerardo    Nutrition Prescription   Recommendations Suggested by Registered Dietitian (RD):   1. 5-6 small frequent meals daily  2. 2600kcal, 100g protein, >10 cups non-caffeine containing beverages daily  3. Healios - 2 servings/day (mix powder with water as directed)   Malnutrition: does not meet criteria at this time     NUTRITION HISTORY  Factors affecting nutrition intake include:constipation, nausea and swallowing difficulties  Current diet: soft foods, some solids  Current appetite/intake: good  PEG Tube: No (would like to try an avoid; had NG removed after surgery on 4/28)  Chemotherapy: HD Cisplatin (started 6/8)  Radiation: ~5/33 fractions completed        Norris tells me that he has been eating well, mostly focused on soft foods and liquids but able to take on some solid foods such as pizza.  He has been drinking at least 3 quarts of water/day and Deejay Feng tea.    He has been struggling with constipation since he had chemo and is looking for suggestions for this.    He is pleased that he has been able to gain a couple pounds with his current diet regimen as he understands eating will become much more difficult.  He is very focused on trying to avoid a feeding tube.     Diet Recall  Breakfast Home made smoothie with yogurt, protein powder, peanut butter, fruit   Lunch Chicken noodle soup (cream based), juicing    Dinner Pizza, yogurt, casseroles   Snacks Yogurt, pudding, juicing smoothies   Beverages Water and tea     ANTHROPOMETRICS  Height: 6'1\"  Weight: 190 lbs/86kg  BMI: 26  Weight Status:  Overweight BMI 25-29.9  IBW: 178 lbs (106%)  Weight History: stable (no weight gain noted per electronic records)  Wt Readings from Last 10 Encounters:   06/08/20 86.2 kg " (190 lb)   05/20/20 88 kg (194 lb)   05/08/20 86.6 kg (191 lb)   04/27/20 92.8 kg (204 lb 9.4 oz)   04/15/20 90.7 kg (200 lb)   04/08/20 91.2 kg (201 lb)     Dosing Weight: 86kg    Medications/vitamins/minerals/herbals:   Reviewed    Labs:   Labs reviewed    NUTRITION FOCUSED PHYSICAL ASSESSMENT FOR DIAGNOSING MALNUTRITION:  Observed:  Not observed due to Covid 19 Pandemic (phone call only)    ASSESSED NUTRITION NEEDS:  Estimated Energy Needs: [id number] kcals (30-35 Kcal/Kg)  Justification: increased needs with CRT  Estimated Protein Needs: 103-130 grams protein (1.2-1.5 g pro/Kg)  Justification: increased needs with CRT  Estimated Fluid Needs: >3000  mL   Justification: increased needs with Cisplatin (risk for nephrotoxicity)    MALNUTRITION:  % Weight Loss:  None noted  % Intake:  Decreased intake does not meet criteria for malnutrition   Subcutaneous Fat Loss:  None observed  Muscle Loss:  None observed  Fluid Retention:  None noted    Malnutrition Diagnosis: Patient does not meet two of the above criteria necessary for diagnosing malnutrition but is at risk    NUTRITION DIAGNOSIS:  Predicted inadequate nutrient intake related to cancer treatment to head/neck region    INTERVENTIONS  Provided written & verbal education:   - Discussed ways to maximize and fortify foods with calories and protein via small frequent meals.    - Advised pt to aim for at least >2600kcal and >105g protein daily.   - Reviewed common barriers to eating with treatment and as treatment progresses. Discussed ways to cope with constipation. Suggested he try Smooth Move tea, prune juice and/or pear juice.  Reviewed coping with mucositis.  Suggested he try Healios (which he ordered while on phone with RD).   - Reviewed ONS options (Mass Pro weight hui, Ensure Enlive, Ensure Plus/Boost Plus, CIB, Benecalorie etc). Encouraged utilizing these ONS in home made shakes/smoothies to prevent flavor fatigue.  Encouraged pt to start consuming 2 ONS or  home made shakes/smoothies daily.       Pt verbalize understanding of materials provided during consult.   Patient Understanding: Excellent  Expected Compliance: Excellent    Goals  1.  Aim for 5-6 small frequent meals  2.  Aim for 2600kcal and 105g protein/day  3. Weight maintenance       Follow-Up Plans: Pt has RD contact information for questions.    Pt to follow up with RD in 1-2 weeks at the time of treatment.     MONITORING AND EVALUATION:  -Food intake  -Fluid/beverage intake  -Liquid meal replacement or supplement  -Weight trends    Grace Xiao RDN, LD

## 2020-06-11 NOTE — PROGRESS NOTES
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 Current dose of 400 cGy. Tolerating treatment well.    2020: Weekly RT visit. Current dose of 1400 cGy. Presents with new submental nodularity.    ED visits/Hospitalizations:  None    Missed Treatments:  None    Subjective: Mr. Reyes presents to clinic today for his weekly on-treatment visit. He is tolerating treatment well and has no pressing concerns or complaints related to his ongoing chemoradiotherapy. He does have two new submental nodules which he reports first appeared within the last week after initiation of therapy.    ROS:   Constitutional  Pain (0-10): 0  Fatigue: None    CNS  Headaches: None    ENT  Mucositis: None    Cardiopulmonary  Dyspnea: None    GI  Nausea/vomiting: None    Nutrition  PEG: No  Diet: Soft    Integumentary  Dermatitis: None    Objective:   Current weight: 88.5 kg  Last week's weight: 88 kg  Starting weight: 88 kg    BP: 162/80 (sitting), 160/79 (standing)  Pulse: 77 (sitting), 84 (standing)     General: 59 year old gentleman seated comfortably in an examination chair in [facility]  HEENT: NC/AT. EOMI. No rhinorrhea or epistaxis. Moist mucus membranes. Healthy-appearing left buccal free flap.   Neck: There are two firm and fixed masses within the submental region. The larger measures approximately 1.5-2 cm within the midline submental region while a second measuring approximately 1 cm is palpated more inferior and laterally located just superior to  the incision from the prior neck dissection.  Pulmonary: No wheezing, stridor or respiratory distress.  Skin: No erythema.    Treatment-related toxicities (CTCAE v5.0):  None    Assessment:    Mr. 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 He is receiving adjuvant chemoradiotherapy for improved locoregional disease control. He presents with new palpable nodules located within IA.    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:    Continue chemoradiotherapy    US neck tomorrow (6/12) prior to treatment with FNA if suspicious for leelee disease      Pain management:    Continue acetaminophen for mild to moderate symptoms    Fluids/Electrolytes/Nutrition:    Continue ongoing follow-up with oncology dietician for evaluation and cares throughout treatment    Dermatitis:    Continue daily moisturizer use to the skin of the left face and neck    Mosaiq chart and setup information reviewed  IGRT images reviewed    Medication list reviewed    Dwayne Kurtz MD/PhD    Dept of Radiation Oncology  [facility]

## 2020-06-12 ENCOUNTER — APPOINTMENT (OUTPATIENT)
Dept: RADIATION ONCOLOGY | Facility: CLINIC | Age: 60
End: 2020-06-12
Attending: RADIOLOGY
Payer: COMMERCIAL

## 2020-06-12 ENCOUNTER — HOSPITAL ENCOUNTER (OUTPATIENT)
Dept: ULTRASOUND IMAGING | Facility: CLINIC | Age: 60
Discharge: HOME | End: 2020-06-12
Attending: RADIOLOGY | Admitting: RADIOLOGY
Payer: COMMERCIAL

## 2020-06-12 DIAGNOSIS — C06.0 SQUAMOUS CELL CANCER OF BUCCAL MUCOSA (H): ICD-10-CM

## 2020-06-12 PROCEDURE — 76536 US EXAM OF HEAD AND NECK: CPT | Mod: XU

## 2020-06-12 PROCEDURE — 77386 ZZH IMRT TREATMENT DELIVERY, COMPLEX: CPT | Performed by: RADIOLOGY

## 2020-06-15 ENCOUNTER — APPOINTMENT (OUTPATIENT)
Dept: RADIATION ONCOLOGY | Facility: CLINIC | Age: 60
End: 2020-06-15
Attending: RADIOLOGY
Payer: COMMERCIAL

## 2020-06-15 DIAGNOSIS — C06.9 ORAL CANCER (H): Primary | ICD-10-CM

## 2020-06-15 PROCEDURE — 77386 ZZH IMRT TREATMENT DELIVERY, COMPLEX: CPT | Performed by: RADIOLOGY

## 2020-06-15 NOTE — PROGRESS NOTES
"Norris Reyes is a 59 year old male who is being evaluated via a billable telephone visit.      The patient has been notified of following:     \"This telephone visit will be conducted via a call between you and your physician/provider. We have found that certain health care needs can be provided without the need for a physical exam.  This service lets us provide the care you need with a short phone conversation.  If a prescription is necessary we can send it directly to your pharmacy.  If lab work is needed we can place an order for that and you can then stop by our lab to have the test done at a later time.    Telephone visits are billed at different rates depending on your insurance coverage. During this emergency period, for some insurers they may be billed the same as an in-person visit.  Please reach out to your insurance provider with any questions.    If during the course of the call the physician/provider feels a telephone visit is not appropriate, you will not be charged for this service.\"    Patient has given verbal consent for Telephone visit?  Yes    What phone number would you like to be contacted at? [phone]    How would you like to obtain your AVS? MyChart     I have reviewed and updated the patient's allergies and medication list.    Concerns: Mouth soreness especially on side of tongue, is very painful.     Refills: None     RADHA Marti    Phone call duration: 28 minutes    ASTER Wang    Reason for Visit: f/u of SCC of the L buccal mucosa     Oncology HPI: Norris Reyes is a 59 year old man with a PMH of leukoplakia, HTN, HLD. 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  HPV ** He underwent resection with L neck dissection and R radial freeflap graft on 4/23/20.  His course was complicated by wound bleeding in his mouth. He had complications of wound dehiscence in the L buccal mucosa after oral feeding. He is was NPO and " "receiving tube feeds.  He met with Dr. Kurtz and Dr. Leonard and initiated concurrent chemoradiation. He started radiation on 6/3 and started HD cisplatin 6/8/20. Of note his NG tube was removed and he is trying to avoid port/PEG placement.     He was called today for weekly follow-up on treatment.     Interval History:  Mr. Manzanares was called today for oncology follow-up. He isn't feel well this week. His main complaint is worsening tongue pain, which he describes as burning. He has salt/soda swishes but nothing else for pain, his wife did buy oralgel. He is still eating and drinking OK and maintaining his weight. He is frustrated that he still can't open his mouth all the way since his surgery, noting that he feels the \"flap is too short.\" He is doing his stretching with little improvement.    He did have mild nausea/stomach upset with Cisplatin last week which was improved with Dex and PRN compazine. He notes hiccups this last week. He did also note tinnitus after the infusion which comes and goes but can be quite strong.    He denies fevers, headaches. He was feeling dizzy with standing so he stopped his Losartan and has been pushing fluids which has helped. No chest pain, SOB, cough, bowel or bladder concerns (has a history of constipation, OK at this time), edema, rashes, neuropathy.     Objective:  No vitals    Well sounding male on phone. Good voice quality. Normal speech and thought process. No audible wheezing or cough.     Labs Reviewed:  Results for MONSE MANZANARES (MRN [id number]) as of 6/17/2020 07:13   6/16/2020 10:10   Sodium 135   Potassium 4.0   Chloride 102   Carbon Dioxide 28   Urea Nitrogen 20   Creatinine 1.13   GFR Estimate 71   GFR Estimate If Black 82   Calcium 8.9   Anion Gap 4   Magnesium 2.2   Albumin 3.5   Protein Total 6.8   Bilirubin Total 0.4   Alkaline Phosphatase 56   ALT 41   AST 17   Glucose 115 (H)   WBC 4.2   Hemoglobin 12.7 (L)   Hematocrit 39.0 (L)   Platelet Count 133 (L) "   RBC Count 4.43   MCV 88   MCH 28.7   MCHC 32.6   RDW 12.0   Diff Method Automated Method   % Neutrophils 77.9   % Lymphocytes 9.0   % Monocytes 11.4   % Eosinophils 1.2   % Basophils 0.0   % Immature Granulocytes 0.5   Nucleated RBCs 0   Absolute Neutrophil 3.3   Absolute Lymphocytes 0.4 (L)   Absolute Monocytes 0.5   Absolute Eosinophils 0.1   Absolute Basophils 0.0   Abs Immature Granulocytes 0.0   Absolute Nucleated RBC 0.0     Assessment and Plan:  1. Onc  SCC of the buccal mucosa, stage NY (cT2 cN2b cM0) s/p resection of the oral cavity with L neck dissection, R radical free flap reconsturction.    Now on concurrent chemoradiation with HD cisplatin, started radiation 6/3/20 and cisplatin 6/8/20. Tolerated cisplatin OK with mild nausea and tinnitus. Labs today all stable.     Having more expected toxicities from radiation-he plans to discuss his concerns with Dr. Kurtz this week and is thinking about not doing more radiation if his tongue will continue to be this irritated. We discussed radiation is the most important part of his treatment and there are things we can do for pain control. I will message Dr. Kurtz to let him know patient concerns.    Will continue weekly follow-up on treatment.     2. ENT  Mucositis/pain: 2/2 surgery and now radiation. Discussed salt/soda swishes and start MMW. Start Tylenol 1000mg TID for pain. Discussed may need to add Oxycodone in the future but will hold off for today.    Trismus: s/p surgery, ongoing issue. Working with SLP. Discussed seeing ENT back after chemorads.    Tinnitus: New after cisplatin. Monitor for improvement, if not may need to switch to weekly cisplatin.     Did not discuss submental nodule today, was evaluated by US thought to be post surgical.     3. GI  Nausea: 2/2 chemotherapy, improved now. Continue Compazine PRN. Discussed hiccups likely from dex and should also improve.    Constipation: No current issues, has Senna tea. Monitor.    4.  FEN  Nutrition: PO at the moment, doing OK and weight stable per report. Continue close monitoring, no PEG.    Hydration: Drinking more fluids, dizziness improved. CMP stable.    5. Cards  HTN: Holding Losartan and BP OK at 136/73 reported. Discussed continuing to hold given this will likely drop with treatment as his intake decreases

## 2020-06-16 ENCOUNTER — VIRTUAL VISIT (OUTPATIENT)
Dept: ONCOLOGY | Facility: CLINIC | Age: 60
End: 2020-06-16
Attending: INTERNAL MEDICINE
Payer: COMMERCIAL

## 2020-06-16 ENCOUNTER — APPOINTMENT (OUTPATIENT)
Dept: RADIATION ONCOLOGY | Facility: CLINIC | Age: 60
End: 2020-06-16
Attending: RADIOLOGY
Payer: COMMERCIAL

## 2020-06-16 DIAGNOSIS — K12.30 MUCOSITIS: ICD-10-CM

## 2020-06-16 DIAGNOSIS — C06.9 ORAL CANCER (H): ICD-10-CM

## 2020-06-16 DIAGNOSIS — C06.0 SQUAMOUS CELL CANCER OF BUCCAL MUCOSA (H): Primary | ICD-10-CM

## 2020-06-16 LAB
ALBUMIN SERPL-MCNC: 3.5 G/DL (ref 3.4–5)
ALP SERPL-CCNC: 56 U/L (ref 40–150)
ALT SERPL W P-5'-P-CCNC: 41 U/L (ref 0–70)
ANION GAP SERPL CALCULATED.3IONS-SCNC: 4 MMOL/L (ref 3–14)
AST SERPL W P-5'-P-CCNC: 17 U/L (ref 0–45)
BASOPHILS # BLD AUTO: 0 10E9/L (ref 0–0.2)
BASOPHILS NFR BLD AUTO: 0 %
BILIRUB SERPL-MCNC: 0.4 MG/DL (ref 0.2–1.3)
BUN SERPL-MCNC: 20 MG/DL (ref 7–30)
CALCIUM SERPL-MCNC: 8.9 MG/DL (ref 8.5–10.1)
CHLORIDE SERPL-SCNC: 102 MMOL/L (ref 94–109)
CO2 SERPL-SCNC: 28 MMOL/L (ref 20–32)
CREAT SERPL-MCNC: 1.13 MG/DL (ref 0.66–1.25)
DIFFERENTIAL METHOD BLD: ABNORMAL
EOSINOPHIL # BLD AUTO: 0.1 10E9/L (ref 0–0.7)
EOSINOPHIL NFR BLD AUTO: 1.2 %
ERYTHROCYTE [DISTWIDTH] IN BLOOD BY AUTOMATED COUNT: 12 % (ref 10–15)
GFR SERPL CREATININE-BSD FRML MDRD: 71 ML/MIN/{1.73_M2}
GLUCOSE SERPL-MCNC: 115 MG/DL (ref 70–99)
HCT VFR BLD AUTO: 39 % (ref 40–53)
HGB BLD-MCNC: 12.7 G/DL (ref 13.3–17.7)
IMM GRANULOCYTES # BLD: 0 10E9/L (ref 0–0.4)
IMM GRANULOCYTES NFR BLD: 0.5 %
LYMPHOCYTES # BLD AUTO: 0.4 10E9/L (ref 0.8–5.3)
LYMPHOCYTES NFR BLD AUTO: 9 %
MAGNESIUM SERPL-MCNC: 2.2 MG/DL (ref 1.6–2.3)
MCH RBC QN AUTO: 28.7 PG (ref 26.5–33)
MCHC RBC AUTO-ENTMCNC: 32.6 G/DL (ref 31.5–36.5)
MCV RBC AUTO: 88 FL (ref 78–100)
MONOCYTES # BLD AUTO: 0.5 10E9/L (ref 0–1.3)
MONOCYTES NFR BLD AUTO: 11.4 %
NEUTROPHILS # BLD AUTO: 3.3 10E9/L (ref 1.6–8.3)
NEUTROPHILS NFR BLD AUTO: 77.9 %
NRBC # BLD AUTO: 0 10*3/UL
NRBC BLD AUTO-RTO: 0 /100
PLATELET # BLD AUTO: 133 10E9/L (ref 150–450)
POTASSIUM SERPL-SCNC: 4 MMOL/L (ref 3.4–5.3)
PROT SERPL-MCNC: 6.8 G/DL (ref 6.8–8.8)
RBC # BLD AUTO: 4.43 10E12/L (ref 4.4–5.9)
SODIUM SERPL-SCNC: 135 MMOL/L (ref 133–144)
WBC # BLD AUTO: 4.2 10E9/L (ref 4–11)

## 2020-06-16 PROCEDURE — 77336 RADIATION PHYSICS CONSULT: CPT | Performed by: RADIOLOGY

## 2020-06-16 PROCEDURE — 36415 COLL VENOUS BLD VENIPUNCTURE: CPT | Performed by: PHYSICIAN ASSISTANT

## 2020-06-16 PROCEDURE — 83735 ASSAY OF MAGNESIUM: CPT | Performed by: PHYSICIAN ASSISTANT

## 2020-06-16 PROCEDURE — 80053 COMPREHEN METABOLIC PANEL: CPT | Performed by: PHYSICIAN ASSISTANT

## 2020-06-16 PROCEDURE — 85025 COMPLETE CBC W/AUTO DIFF WBC: CPT | Performed by: PHYSICIAN ASSISTANT

## 2020-06-16 PROCEDURE — 99214 OFFICE O/P EST MOD 30 MIN: CPT | Mod: 95 | Performed by: PHYSICIAN ASSISTANT

## 2020-06-16 PROCEDURE — 77386 ZZH IMRT TREATMENT DELIVERY, COMPLEX: CPT | Performed by: RADIOLOGY

## 2020-06-16 NOTE — Clinical Note
"    6/16/2020         RE: Norris Reyes  [address]  [place] [place] [facility] [place] [facility] [place] [address]        Dear Colleague,    Thank you for referring your patient, Norris Reyes, to the [facility]. Please see a copy of my visit note below.    Norris Reyes is a 59 year old male who is being evaluated via a billable telephone visit.      The patient has been notified of following:     \"This telephone visit will be conducted via a call between you and your physician/provider. We have found that certain health care needs can be provided without the need for a physical exam.  This service lets us provide the care you need with a short phone conversation.  If a prescription is necessary we can send it directly to your pharmacy.  If lab work is needed we can place an order for that and you can then stop by our lab to have the test done at a later time.    Telephone visits are billed at different rates depending on your insurance coverage. During this emergency period, for some insurers they may be billed the same as an in-person visit.  Please reach out to your insurance provider with any questions.    If during the course of the call the physician/provider feels a telephone visit is not appropriate, you will not be charged for this service.\"    Patient has given verbal consent for Telephone visit?  Yes    What phone number would you like to be contacted at? [phone]    How would you like to obtain your AVS? [email or web address]     I have reviewed and updated the patient's allergies and medication list.    Concerns: Mouth soreness especially on side of tongue, is very painful.     Refills: None     RADHA Marti    Phone call duration: 28 minutes    ASTER Wang    Reason for Visit: f/u of SCC of the L buccal mucosa     Oncology HPI: Norris Reyes is a 59 year old man with a PMH of leukoplakia, HTN, HLD. 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  HPV ** He " underwent resection with L neck dissection and R radial freeflap graft on 4/23/20.  His course was complicated by wound bleeding in his mouth. He had complications of wound dehiscence in the L buccal mucosa after oral feeding. He is was NPO and receiving tube feeds.  He met with Dr. Kurtz and Dr. Leonard and initiated concurrent chemoradiation. He started radiation on 6/3 and started HD cisplatin 6/8/20. Of note his NG tube was removed and he is trying to avoid port/PEG placement.     He was called today for weekly follow-up on treatment.     Interval History:  IN PROGRESS        Again, thank you for allowing me to participate in the care of your patient.        Sincerely,        ASTER Wang

## 2020-06-17 ENCOUNTER — THERAPY VISIT (OUTPATIENT)
Dept: SPEECH THERAPY | Facility: CLINIC | Age: 60
End: 2020-06-17
Payer: COMMERCIAL

## 2020-06-17 ENCOUNTER — APPOINTMENT (OUTPATIENT)
Dept: RADIATION ONCOLOGY | Facility: CLINIC | Age: 60
End: 2020-06-17
Attending: RADIOLOGY
Payer: COMMERCIAL

## 2020-06-17 ENCOUNTER — HOSPITAL ENCOUNTER (OUTPATIENT)
Dept: NUTRITION | Facility: CLINIC | Age: 60
Discharge: HOME | End: 2020-06-17
Attending: RADIOLOGY | Admitting: RADIOLOGY
Payer: COMMERCIAL

## 2020-06-17 DIAGNOSIS — R13.12 OROPHARYNGEAL DYSPHAGIA: ICD-10-CM

## 2020-06-17 DIAGNOSIS — C06.0 SQUAMOUS CELL CANCER OF BUCCAL MUCOSA (H): Primary | ICD-10-CM

## 2020-06-17 PROCEDURE — 97803 MED NUTRITION INDIV SUBSEQ: CPT | Mod: TEL | Performed by: DIETITIAN, REGISTERED

## 2020-06-17 PROCEDURE — 77386 ZZH IMRT TREATMENT DELIVERY, COMPLEX: CPT | Performed by: RADIOLOGY

## 2020-06-17 NOTE — DISCHARGE INSTRUCTIONS
Mayte Duran MA, CCC-SLP  Speech-Language Pathologist   [facility] [place]   Dept of Otolaryngology/D&T - 4th floor  Dept of Rehabilitation Services   Pager: [phone]  Phone: [phone]  Email: [email or web address]   Workdays: Tuesday, Wednesday, Friday       MANHAZ Leija (music), M.A., CFY-SLP  Speech Language Pathology Clinical Fellow  [facility] Trained Vocologist   [facility]   [phone] (Direct)  [email or web address]      Ling Scanlon MS, CCC-SLP  Speech Language Pathologist  [facility] [place]  Dept. of Otolaryngology  Department of Rehabilitation services  [address] [place] [id number]  Email: [email or web address]  [email or web address]   Pager: [phone]  Voicemail: [phone]

## 2020-06-18 ENCOUNTER — OFFICE VISIT (OUTPATIENT)
Dept: RADIATION ONCOLOGY | Facility: CLINIC | Age: 60
End: 2020-06-18
Attending: RADIOLOGY
Payer: COMMERCIAL

## 2020-06-18 VITALS — WEIGHT: 185.6 LBS | BODY MASS INDEX: 25.17 KG/M2

## 2020-06-18 DIAGNOSIS — C06.0 SQUAMOUS CELL CANCER OF BUCCAL MUCOSA (H): Primary | ICD-10-CM

## 2020-06-18 PROCEDURE — 77300 RADIATION THERAPY DOSE PLAN: CPT | Performed by: RADIOLOGY

## 2020-06-18 PROCEDURE — 77386 ZZH IMRT TREATMENT DELIVERY, COMPLEX: CPT | Performed by: RADIOLOGY

## 2020-06-18 PROCEDURE — 77338 DESIGN MLC DEVICE FOR IMRT: CPT | Performed by: RADIOLOGY

## 2020-06-18 PROCEDURE — 77301 RADIOTHERAPY DOSE PLAN IMRT: CPT | Performed by: RADIOLOGY

## 2020-06-18 NOTE — PROGRESS NOTES
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 Current dose of 400 cGy. Tolerating treatment well.    2020: Cycle 1, day 1 of high-dose cisplatin chemotherapy    2020: Weekly RT visit. Current dose of 1400 cGy. Presents with new submental nodularity.    2020: Weekly RT visit. Current dose of 2400 cGy. Moderate to severe oral tongue pain.    ED visits/Hospitalizations:  None    Missed Treatments:  None    Subjective: Mr. Reyes presents to clinic today for his weekly on-treatment visit. He complains of worsening oral tongue pain over the past week and reports that he is considering discontinuing treatment. He also states that he has discussed his case with another radiation oncologist at an outside facility who has recommended that he receive treatment with a shielded stent and Mr. Reyes presents a printout of an abstract detailing fabrication of a customized oral stent lined with Cerrobend. His weight is down approximately 4 kg over the past week which he attributes to decreased PO intake related to his oral tongue pain. His neck US performed last week to further characterize his submental nodules demonstrated complex fluid collections favoring to represent post-op hematomas vs complex seromas.     ROS:   Constitutional  Pain (0-10): 4  Fatigue: Mild     CNS  Headaches: None    ENT  Mucositis: Moderate    Cardiopulmonary  Dyspnea: None    GI  Nausea/vomiting: None    Nutrition  PEG: No  Diet:  Soft    Integumentary  Dermatitis: Mild to moderate    Objective:   Current weight: 84.2 kg  Last week's weight: 88.5 kg  Starting weight: 88 kg    BP: 172/78 (sitting), 172/95 (standing)  Pulse: 67 (sitting), 71 (standing)     General: 59 year old gentleman seated comfortably in an examination chair in [facility]  HEENT: NC/AT. EOMI. No rhinorrhea or epistaxis. Mildly dry mucus membranes. Left buccal free flap is healthy appearing. Moderate patchy mucositis involving the left buccal surfaces, left FOM and left lateral oral tongue.   Neck: Stable submental nodules  Pulmonary: No wheezing, stridor or respiratory distress  Skin: Mild to moderate erythema involving the left lateral face and neck    Treatment-related toxicities (CTCAE v5.0):  Dermatitis: Grade 1  Mucositis: Grade 2    Assessment:    Mr. 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 He is receiving adjuvant chemoradiotherapy for improved locoregional disease control. He is developing worsening oral cavity mucositis related to his ongoing chemoradiation.     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 Reyes in which I reviewed the suggestion that he received regarding use of a Cerrobend-shielded stent. I explained that, while we frequently use lead and Cerrobend for shielding in electron beam treatment, I would strongly disagree with the outside advice he received regarding use of such a device with his current treatment. I explained that the interaction between intraoral Cerrobend and high energy photons would substantially increase the amount of secondary electron scatter and, thus, significantly increase the dose to the oral mucosa. I instead proposed that we attempt to modify the stent we are currently using to provide slightly more lateralization of the anterior oral tongue to further help  mitigate dose to this region. I did explain, though, that given the extent of his tumor with the operative bed extending to the left FOM, I would not be able to completely spare the more posterior aspects of the left oral tongue and we will focus on controlling his symptoms through aggressive pain management.    Pain management:    Continue acetaminophen and magic mouthwash for mild to moderate symptoms    Discussed starting low-dose oxycodone for moderate to severe breakthrough pain however Mr. Reyes was hesitant to start narcotic therapy at this time secondary to a concern with the risk of developing opioid dependence    Fluids/Electrolytes/Nutrition:    Continue ongoing follow-up with oncology dietician for evaluation and cares throughout treatment    Dermatitis:    Continue BID moisturizer use to the skin of the left face and neck    Mucositis:     Pain management as described above    Continue frequent salt/soda rinses     Mosaiq chart and setup information reviewed  IGRT images reviewed    Medication list reviewed    Dwayne Kurtz MD/PhD    Dept of Radiation Oncology  [facility]

## 2020-06-18 NOTE — PROGRESS NOTES
"Norris Reyes is a 59 year old male who is being evaluated via a billable telephone visit.      The patient has been notified of following:     \"This telephone visit will be conducted via a call between you and your physician/provider. We have found that certain health care needs can be provided without the need for a physical exam.  This service lets us provide the care you need with a short phone conversation.  If a prescription is necessary we can send it directly to your pharmacy.  If lab work is needed we can place an order for that and you can then stop by our lab to have the test done at a later time.    Telephone visits are billed at different rates depending on your insurance coverage. During this emergency period, for some insurers they may be billed the same as an in-person visit.  Please reach out to your insurance provider with any questions.    If during the course of the call the physician/provider feels a telephone visit is not appropriate, you will not be charged for this service.\"    Patient has given verbal consent for Telephone visit?  Yes    CLINICAL NUTRITION SERVICES - REASSESSMENT NOTE   EVALUATION OF PREVIOUS PLAN OF CARE:   Referring Physician: Jerardo  Time spent with patient: 15 minutes.    Current diet: regular, soft foods  Current appetite/intake: fair, declining  PEG Tube: No; had NG tube removed 4/28   Radiation:  10/33 fractions completed  Chemotherapy: HD Cisplatin - C1D1 6/8     Monitoring from previous assessment:   -PO intake: Norris's intake has declined due to soreness of his mouth and tongue.   Per his wife, he has not been eating as much and has been relying more on soft foods and smoothies.   He purchased and started taking Healios BID.    -Weight trending down: down 10 lb x past week (6%)  Wt Readings from Last 7 Encounters:   06/18/20 84.2 kg (185 lb 9.6 oz)   06/11/20 88.5 kg (195 lb)   06/08/20 86.2 kg (190 lb)   05/20/20 88 kg (194 lb)   05/08/20 86.6 kg (191 lb) "   04/27/20 92.8 kg (204 lb 9.4 oz)   04/15/20 90.7 kg (200 lb)     Previous Goals:   1.  Aim for 5-6 small frequent meals - goal not met  2.  Aim for 2600kcal and 105g protein/day - goal not met  3. Weight maintenance - goal not met  Evaluation: Not met   Previous Nutrition Diagnosis:   Predicted inadequate nutrient intake related to cancer treatment to head/neck region     Evaluation: Declining   NEW FINDINGS:   6% wt loss (10 lbs) x past 1 week   CURRENT NUTRITION DIAGNOSIS   Inadequate oral intake related to pain with chewing and swallowing as evidenced by 6% wt loss in 1 week   INTERVENTIONS   Recommendations / Nutrition Prescription    If PO intake and weight trends continue to decline this rapid, strongly recommend:  ENTERAL NUTRITION RECOMMENDATIONS FOR MD/PROVIDER TO ORDER  Formula: Isosource 1.5 sean  Volume: 7 cartons/day (1750mL, 1330ml free water)  Provisions:  2625kcal (30kcal/kg), 119g protein (1.4g/kg), 308g CHO, 26g fiber      Tube feeding schedule via gravity bag feedings  Day 1: 1 carton formula TID spread 3-4 hours apart   Day 2: 1 1/2 cartons formula TID spread 3-4 hours apart   Day 3: 2 cartons formula TID spread 3-4 hours apart    Day 4: 2 cartons formula TID spread 3-4 hours apart; plus 1 carton add'l prn    Flush with 30-60mL water before and after each feeding for patency  Flush with additional 120 mL water 6 x/day to meet 100% hydration (~10 cups) including free water in formula and water flushes     Implementation  --Enteral Nutrition via PEG tube - reviewed potential need for PEG placement with poor intake and weight loss so early on in treatment.    --General/healthful diet and Medical Food Supplement - reviewed nutrition needs and strongly encouraged 5-6 small frequent meals via ONS, home made shakes/smoothies and soft/pureed foods.    Goals   1. 5-6 small, frequent meals (>2500kcal, >100g protein, >10 cups water/gatorade daily)  2. Weight maintenance/no more than 2 lb wt  loss/wk    Follow up/Monitoring:   -Food intake  -Need for PEG tube  -Weight trends    Grace Xiao RDN, [facility] [place]  [phone]

## 2020-06-19 ENCOUNTER — APPOINTMENT (OUTPATIENT)
Dept: RADIATION ONCOLOGY | Facility: CLINIC | Age: 60
End: 2020-06-19
Attending: RADIOLOGY
Payer: COMMERCIAL

## 2020-06-19 PROCEDURE — 77386 ZZH IMRT TREATMENT DELIVERY, COMPLEX: CPT | Performed by: RADIOLOGY

## 2020-06-22 ENCOUNTER — APPOINTMENT (OUTPATIENT)
Dept: RADIATION ONCOLOGY | Facility: CLINIC | Age: 60
End: 2020-06-22
Attending: RADIOLOGY
Payer: COMMERCIAL

## 2020-06-22 ENCOUNTER — VIRTUAL VISIT (OUTPATIENT)
Dept: ONCOLOGY | Facility: CLINIC | Age: 60
End: 2020-06-22
Attending: PHYSICIAN ASSISTANT
Payer: COMMERCIAL

## 2020-06-22 DIAGNOSIS — K12.30 MUCOSITIS: ICD-10-CM

## 2020-06-22 DIAGNOSIS — C06.0 SQUAMOUS CELL CANCER OF BUCCAL MUCOSA (H): Primary | ICD-10-CM

## 2020-06-22 DIAGNOSIS — C06.0 SQUAMOUS CELL CANCER OF BUCCAL MUCOSA (H): ICD-10-CM

## 2020-06-22 LAB
ALBUMIN SERPL-MCNC: 3.6 G/DL (ref 3.4–5)
ALP SERPL-CCNC: 64 U/L (ref 40–150)
ALT SERPL W P-5'-P-CCNC: 25 U/L (ref 0–70)
ANION GAP SERPL CALCULATED.3IONS-SCNC: 4 MMOL/L (ref 3–14)
AST SERPL W P-5'-P-CCNC: 12 U/L (ref 0–45)
BASOPHILS # BLD AUTO: 0 10E9/L (ref 0–0.2)
BASOPHILS NFR BLD AUTO: 0 %
BILIRUB SERPL-MCNC: 0.4 MG/DL (ref 0.2–1.3)
BUN SERPL-MCNC: 24 MG/DL (ref 7–30)
CALCIUM SERPL-MCNC: 9.4 MG/DL (ref 8.5–10.1)
CHLORIDE SERPL-SCNC: 104 MMOL/L (ref 94–109)
CO2 SERPL-SCNC: 29 MMOL/L (ref 20–32)
CREAT SERPL-MCNC: 1.13 MG/DL (ref 0.66–1.25)
DIFFERENTIAL METHOD BLD: ABNORMAL
EOSINOPHIL # BLD AUTO: 0 10E9/L (ref 0–0.7)
EOSINOPHIL NFR BLD AUTO: 0.3 %
ERYTHROCYTE [DISTWIDTH] IN BLOOD BY AUTOMATED COUNT: 12.3 % (ref 10–15)
GFR SERPL CREATININE-BSD FRML MDRD: 71 ML/MIN/{1.73_M2}
GLUCOSE SERPL-MCNC: 104 MG/DL (ref 70–99)
HCT VFR BLD AUTO: 39.6 % (ref 40–53)
HGB BLD-MCNC: 12.7 G/DL (ref 13.3–17.7)
IMM GRANULOCYTES # BLD: 0.1 10E9/L (ref 0–0.4)
IMM GRANULOCYTES NFR BLD: 1 %
LYMPHOCYTES # BLD AUTO: 0.5 10E9/L (ref 0.8–5.3)
LYMPHOCYTES NFR BLD AUTO: 7.8 %
MAGNESIUM SERPL-MCNC: 2.1 MG/DL (ref 1.6–2.3)
MCH RBC QN AUTO: 28.7 PG (ref 26.5–33)
MCHC RBC AUTO-ENTMCNC: 32.1 G/DL (ref 31.5–36.5)
MCV RBC AUTO: 89 FL (ref 78–100)
MONOCYTES # BLD AUTO: 0.4 10E9/L (ref 0–1.3)
MONOCYTES NFR BLD AUTO: 6.4 %
NEUTROPHILS # BLD AUTO: 4.9 10E9/L (ref 1.6–8.3)
NEUTROPHILS NFR BLD AUTO: 84.5 %
NRBC # BLD AUTO: 0 10*3/UL
NRBC BLD AUTO-RTO: 0 /100
PLATELET # BLD AUTO: 105 10E9/L (ref 150–450)
POTASSIUM SERPL-SCNC: 4.4 MMOL/L (ref 3.4–5.3)
PROT SERPL-MCNC: 7.2 G/DL (ref 6.8–8.8)
RBC # BLD AUTO: 4.43 10E12/L (ref 4.4–5.9)
SODIUM SERPL-SCNC: 137 MMOL/L (ref 133–144)
WBC # BLD AUTO: 5.8 10E9/L (ref 4–11)

## 2020-06-22 PROCEDURE — 36415 COLL VENOUS BLD VENIPUNCTURE: CPT | Performed by: PHYSICIAN ASSISTANT

## 2020-06-22 PROCEDURE — 77386 ZZH IMRT TREATMENT DELIVERY, COMPLEX: CPT | Performed by: RADIOLOGY

## 2020-06-22 PROCEDURE — 99213 OFFICE O/P EST LOW 20 MIN: CPT | Mod: 95 | Performed by: PHYSICIAN ASSISTANT

## 2020-06-22 PROCEDURE — 80053 COMPREHEN METABOLIC PANEL: CPT | Performed by: PHYSICIAN ASSISTANT

## 2020-06-22 PROCEDURE — 85025 COMPLETE CBC W/AUTO DIFF WBC: CPT | Performed by: PHYSICIAN ASSISTANT

## 2020-06-22 PROCEDURE — 83735 ASSAY OF MAGNESIUM: CPT | Performed by: PHYSICIAN ASSISTANT

## 2020-06-22 PROCEDURE — 40000114 ZZH STATISTIC NO CHARGE CLINIC VISIT

## 2020-06-22 NOTE — PROGRESS NOTES
"Norris Reyes is a 59 year old male who is being evaluated via a billable telephone visit.      The patient has been notified of following:     \"This telephone visit will be conducted via a call between you and your physician/provider. We have found that certain health care needs can be provided without the need for a physical exam.  This service lets us provide the care you need with a short phone conversation.  If a prescription is necessary we can send it directly to your pharmacy.  If lab work is needed we can place an order for that and you can then stop by our lab to have the test done at a later time.    Telephone visits are billed at different rates depending on your insurance coverage. During this emergency period, for some insurers they may be billed the same as an in-person visit.  Please reach out to your insurance provider with any questions.    If during the course of the call the physician/provider feels a telephone visit is not appropriate, you will not be charged for this service.\"    Patient has given verbal consent for Telephone visit?  Yes    What phone number would you like to be contacted at? [phone]    How would you like to obtain your AVS? MyChart     Reason for Visit: f/u of SCC of the L buccal mucosa     Oncology HPI: Norris Reyes is a 59 year old man with a PMH of leukoplakia, HTN, HLD. 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  He underwent resection with L neck dissection and R radial freeflap graft on 4/23/20.  His course was complicated by wound bleeding in his mouth. He had complications of wound dehiscence in the L buccal mucosa after oral feeding. He is was NPO and receiving tube feeds.  He met with Dr. Kurtz and Dr. Leonard and initiated concurrent chemoradiation. He started radiation on 6/3 and started HD cisplatin 6/8/20. Of note his NG tube was removed and he is trying to avoid port/PEG placement.      He was called today " "for weekly follow-up on treatment. (week 2)     Interval History:  Mr. Reyes was called today for oncology follow-up. He has been feeling better this week, in relation to his chemotherapy.  No nausea, didn't use any anti emetics this week.  (last week used his Dex and 2 compazine prns).   His main complaint is worsening left lateral tongue pain. He has salt/soda swishes, a Q-tip to apply MMW to the area and taking Tylenol 1000 mg TID. He is still eating and drinking OK.  Mostly going to a soft diet- eggs, smoothies, casseroles and goal is TID.  His weight dropped to 185 last week and he has maintained this.  He is frustrated that he still can't open his mouth all the way since his surgery, noting that he feels the \"flap is too short.\" He is doing his stretching with little improvement.  Is coming to terms with this is likely not going to get better.       Hydration is going well.  No dizziness this week.  Mostly not taking his losartan.  On occ his BP was 150/80 and he took one pill , but most days is 130/80 so he then does not take it.  Has 3 fingers on his right hand that are numb from his surgery, no other chemo-induced neuropathy.  Tinnitus is quieter and intermittent this week.  Bowels are OK with prn softeners. His mood is stable, wife is a good support.  They are driving from [employer] daily.  He is not falling asleep very well.  Wonders if he can get ambien.        Objective:  Vitals last week stable.  Weight loss noted     Well sounding male on phone. Good voice quality. Normal speech and thought process. No audible wheezing or cough.      Labs Reviewed:     4/29/2020 05:48 6/8/2020 06:48 6/16/2020 10:10   Sodium 145 (H) 137 135   Potassium 3.9 3.8 4.0   Chloride 112 (H) 104 102   Carbon Dioxide 29 28 28   Urea Nitrogen 39 (H) 12 20   Creatinine 1.03 1.08 1.13   GFR Estimate 79 75 71   GFR Estimate If Black >90 87 82   Calcium 9.5 9.0 8.9   Anion Gap 4 5 4   Magnesium  2.3 2.2   Phosphorus 2.5   "   Albumin  3.8 3.5   Protein Total  7.1 6.8   Bilirubin Total  0.5 0.4   Alkaline Phosphatase  69 56   ALT  24 41   AST  12 17   Glucose 109 (H) 118 (H) 115 (H)   WBC 4.1 3.2 (L) 4.2   Hemoglobin 11.4 (L) 12.7 (L) 12.7 (L)   Hematocrit 36.9 (L) 39.0 (L) 39.0 (L)   Platelet Count 155 150 133 (L)   RBC Count 4.03 (L) 4.40 4.43   MCV 92 89 88     Assessment and Plan:  1. Onc  SCC of the buccal mucosa, stage NY (cT2 cN2b cM0) s/p resection of the oral cavity with L neck dissection, R radical free flap reconsturction.     Now on concurrent chemoradiation with HD cisplatin, started radiation 6/3/20 and cisplatin 6/8/20. Tolerated cisplatin OK with mild nausea and tinnitus. Labs today all stable.      Will continue weekly follow-up on treatment.      2. ENT  Mucositis/pain: 2/2 surgery and now radiation. Continue salt/soda swishes, MMW and Tylenol 1000mg TID for pain. Discussed may need to add Oxycodone in the future but will hold off for today. With eating pain levels are 6/10.  At rest they are 2/10.       Trismus: s/p surgery, ongoing issue. Working with SLP. Discussed seeing ENT back after chemorads.     Tinnitus: quieter this week and intermittent.  Monitor for improvement, if not may need to switch to weekly cisplatin.      Did not discuss submental nodule today, was evaluated by US thought to be post surgical.      3. GI  Nausea: none this week.  Post cisplatin used Dex and only two prn compazine     Constipation: stable.  Has miralax and senna prn     4. FEN  Nutrition: PO at the moment, no PEG.  Down ~10 pounds, but holding stable now     Hydration: better hydration this week.  No dizziness. CMP WNL.     5. Cards  HTN: Holding Losartan and BP mostly stable. Discussed continuing to hold given this will likely drop with treatment as his intake decreases     6. Sleep: difficulty going to sleep, sometimes pain/restlessness contributing.  Has used melatonin in the past w/o help.  OK to use ativan intermittently.   Discussed this is habit forming.       Phone call duration: 18 minutes    Jane Valencia PA-C

## 2020-06-22 NOTE — LETTER
"    6/22/2020         RE: Norris Reyes  [address]  [place] [address]      Norris Reyes is a 59 year old male who is being evaluated via a billable telephone visit.      The patient has been notified of following:     \"This telephone visit will be conducted via a call between you and your physician/provider. We have found that certain health care needs can be provided without the need for a physical exam.  This service lets us provide the care you need with a short phone conversation.  If a prescription is necessary we can send it directly to your pharmacy.  If lab work is needed we can place an order for that and you can then stop by our lab to have the test done at a later time.    Telephone visits are billed at different rates depending on your insurance coverage. During this emergency period, for some insurers they may be billed the same as an in-person visit.  Please reach out to your insurance provider with any questions.    If during the course of the call the physician/provider feels a telephone visit is not appropriate, you will not be charged for this service.\"    Patient has given verbal consent for Telephone visit?  Yes    What phone number would you like to be contacted at? [phone]    How would you like to obtain your AVS? MyChart     Reason for Visit: f/u of SCC of the L buccal mucosa     Oncology HPI: Norris Reyes is a 59 year old man with a PMH of leukoplakia, HTN, HLD. 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  He underwent resection with L neck dissection and R radial freeflap graft on 4/23/20.  His course was complicated by wound bleeding in his mouth. He had complications of wound dehiscence in the L buccal mucosa after oral feeding. He is was NPO and receiving tube feeds.  He met with Dr. Kurtz and Dr. Leonard and initiated concurrent chemoradiation. He started radiation on 6/3 and started HD cisplatin 6/8/20. Of note his NG tube " "was removed and he is trying to avoid port/PEG placement.      He was called today for weekly follow-up on treatment. (week 2)     Interval History:  Mr. Reyes was called today for oncology follow-up. He has been feeling better this week, in relation to his chemotherapy.  No nausea, didn't use any anti emetics this week.  (last week used his Dex and 2 compazine prns).   His main complaint is worsening left lateral tongue pain. He has salt/soda swishes, a Q-tip to apply MMW to the area and taking Tylenol 1000 mg TID. He is still eating and drinking OK.  Mostly going to a soft diet- eggs, smoothies, casseroles and goal is TID.  His weight dropped to 185 last week and he has maintained this.  He is frustrated that he still can't open his mouth all the way since his surgery, noting that he feels the \"flap is too short.\" He is doing his stretching with little improvement.  Is coming to terms with this is likely not going to get better.       Hydration is going well.  No dizziness this week.  Mostly not taking his losartan.  On occ his BP was 150/80 and he took one pill , but most days is 130/80 so he then does not take it.  Has 3 fingers on his right hand that are numb from his surgery, no other chemo-induced neuropathy.  Tinnitus is quieter and intermittent this week.  Bowels are OK with prn softeners. His mood is stable, wife is a good support.  They are driving from [employer] daily.  He is not falling asleep very well.  Wonders if he can get ambien.        Objective:  Vitals last week stable.  Weight loss noted     Well sounding male on phone. Good voice quality. Normal speech and thought process. No audible wheezing or cough.      Labs Reviewed:     4/29/2020 05:48 6/8/2020 06:48 6/16/2020 10:10   Sodium 145 (H) 137 135   Potassium 3.9 3.8 4.0   Chloride 112 (H) 104 102   Carbon Dioxide 29 28 28   Urea Nitrogen 39 (H) 12 20   Creatinine 1.03 1.08 1.13   GFR Estimate 79 75 71   GFR Estimate If Black >90 87 82 "   Calcium 9.5 9.0 8.9   Anion Gap 4 5 4   Magnesium  2.3 2.2   Phosphorus 2.5     Albumin  3.8 3.5   Protein Total  7.1 6.8   Bilirubin Total  0.5 0.4   Alkaline Phosphatase  69 56   ALT  24 41   AST  12 17   Glucose 109 (H) 118 (H) 115 (H)   WBC 4.1 3.2 (L) 4.2   Hemoglobin 11.4 (L) 12.7 (L) 12.7 (L)   Hematocrit 36.9 (L) 39.0 (L) 39.0 (L)   Platelet Count 155 150 133 (L)   RBC Count 4.03 (L) 4.40 4.43   MCV 92 89 88     Assessment and Plan:  1. Onc  SCC of the buccal mucosa, stage [place] (cT2 cN2b cM0) s/p resection of the oral cavity with L neck dissection, R radical free flap reconsturction.     Now on concurrent chemoradiation with HD cisplatin, started radiation 6/3/20 and cisplatin 6/8/20. Tolerated cisplatin OK with mild nausea and tinnitus. Labs today all stable.      Will continue weekly follow-up on treatment.      2. ENT  Mucositis/pain: 2/2 surgery and now radiation. Continue salt/soda swishes, MMW and Tylenol 1000mg TID for pain. Discussed may need to add Oxycodone in the future but will hold off for today. With eating pain levels are 6/10.  At rest they are 2/10.       Trismus: s/p surgery, ongoing issue. Working with SLP. Discussed seeing ENT back after chemorads.     Tinnitus: quieter this week and intermittent.  Monitor for improvement, if not may need to switch to weekly cisplatin.      Did not discuss submental nodule today, was evaluated by US thought to be post surgical.      3. GI  Nausea: none this week.  Post cisplatin used Dex and only two prn compazine     Constipation: stable.  Has miralax and senna prn     4. FEN  Nutrition: PO at the moment, no PEG.  Down ~10 pounds, but holding stable now     Hydration: better hydration this week.  No dizziness. CMP WNL.     5. Cards  HTN: Holding Losartan and BP mostly stable. Discussed continuing to hold given this will likely drop with treatment as his intake decreases     6. Sleep: difficulty going to sleep, sometimes pain/restlessness contributing.   Has used melatonin in the past w/o help.  OK to use ativan intermittently.  Discussed this is habit forming.       Phone call duration: 18 minutes    MADHU Thompson PA-C

## 2020-06-23 ENCOUNTER — APPOINTMENT (OUTPATIENT)
Dept: RADIATION ONCOLOGY | Facility: CLINIC | Age: 60
End: 2020-06-23
Attending: RADIOLOGY
Payer: COMMERCIAL

## 2020-06-23 PROCEDURE — 77386 ZZH IMRT TREATMENT DELIVERY, COMPLEX: CPT | Performed by: RADIOLOGY

## 2020-06-23 PROCEDURE — 77336 RADIATION PHYSICS CONSULT: CPT | Performed by: RADIOLOGY

## 2020-06-24 ENCOUNTER — APPOINTMENT (OUTPATIENT)
Dept: RADIATION ONCOLOGY | Facility: CLINIC | Age: 60
End: 2020-06-24
Attending: RADIOLOGY
Payer: COMMERCIAL

## 2020-06-24 PROCEDURE — 77386 ZZH IMRT TREATMENT DELIVERY, COMPLEX: CPT | Performed by: RADIOLOGY

## 2020-06-25 ENCOUNTER — APPOINTMENT (OUTPATIENT)
Dept: RADIATION ONCOLOGY | Facility: CLINIC | Age: 60
End: 2020-06-25
Attending: RADIOLOGY
Payer: COMMERCIAL

## 2020-06-25 VITALS
SYSTOLIC BLOOD PRESSURE: 129 MMHG | DIASTOLIC BLOOD PRESSURE: 75 MMHG | HEART RATE: 80 BPM | BODY MASS INDEX: 25.09 KG/M2 | WEIGHT: 185 LBS

## 2020-06-25 DIAGNOSIS — C06.0 SQUAMOUS CELL CANCER OF BUCCAL MUCOSA (H): Primary | ICD-10-CM

## 2020-06-25 PROCEDURE — 77386 ZZH IMRT TREATMENT DELIVERY, COMPLEX: CPT | Performed by: RADIOLOGY

## 2020-06-25 RX ORDER — MINERAL OIL, PETROLATUM 425; 573 MG/G; MG/G
OINTMENT OPHTHALMIC
Qty: 1 TUBE | Refills: 1 | Status: SHIPPED | OUTPATIENT
Start: 2020-06-25 | End: 2020-07-24

## 2020-06-25 NOTE — PROGRESS NOTES
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 Current dose of 400 cGy. Tolerating treatment well.    2020: Cycle 1, day 1 of high-dose cisplatin chemotherapy    2020: Weekly RT visit. Current dose of 1400 cGy. Presents with new submental nodularity.    2020: Weekly RT visit. Current dose of 2400 cGy. Moderate to severe oral tongue pain.    2020: Weekly RT visit. Current dose of 3400 cGy. Improved oral cavity pain.    ED visits/Hospitalizations:  None    Missed Treatments:  None    Subjective: Mr. Reyes presents to clinic today for his weekly on-treatment visit. He reports improved pain involving the anterior oral tongue after his oral stent/bite block was modified this past week. He currently rates his symptoms as a 3/10 in severity and is using 3000 mg daily of acetaminophen for pain control. He does have prescriptions for viscous lidocaine and oxycodone but has not yet started these medications as he feels that his symptoms are controlled on his current regimen. He is eating a soft diet by mouth and his weight has remained stable over the past week.     ROS:   Constitutional  Pain (0-10): 3 (mouth), 0 (throat), 1 (skin)  Fatigue: Mild     CNS  Headaches: None    ENT  Mucositis: Moderate    Cardiopulmonary  Dyspnea: None    GI  Nausea/vomiting: None    Nutrition  PEG: No  Diet: Soft    Integumentary  Dermatitis: Moderate    Objective:   Current weight: 83.9 kg  Last week's weight: 84.2 kg  Starting  weight: 88 kg    BP: 129/75 (sitting), 112/72 (standing)  Pulse: 80 (sitting), 86 (standing)     General: 59 year old gentleman seated comfortably in an examination chair in [facility]  HEENT: NC/AT. EOMI. No rhinorrhea or epistaxis. Moderately dry mucus membranes. Left buccal free flap is healthy appearing. Moderate confluent mucositis extending from the left oral commissure and involving the entirety of the left buccal mucosa posteriorly to the RMT and medially extending onto the lateral surface of the left FOM and left hard and soft palates. There continues to be two 1 cm shallow ulcerations along the left lateral oral tongue which are unchanged from last week. No thrush.  Pulmonary: No wheezing, stridor or respiratory distress  Skin: Moderate erythema involving the left lateral face and neck    Treatment-related toxicities (CTCAE v5.0):  Dermatitis: Grade 2  Mucositis: Grade 2    Assessment:    Mr. 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 He is receiving adjuvant chemoradiotherapy for improved locoregional disease control. He is tolerating treatment reasonably well with progressive treatment-induced dermatitis and mucositis.    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:    Continue chemoradiotherapy    Pain management:    Continue acetaminophen     Start magic mouthwash as needed for mild to moderate symptoms    Start oxycodone as needed for moderate to severe pain    Fluids/Electrolytes/Nutrition:    Continue diet as tolerated    Continue ongoing follow-up with oncology dietician for evaluation and cares throughout treatment    Dermatitis:    Continue BID moisturizer use to the skin of the left face and neck    Mucositis:     Pain management as described above    Continue frequent salt/soda rinses     Dry eyes:    Start frequent saline drops to the left eye    Start Refresh PM  drops to the left eye at bedtime     Mosaiq chart and setup information reviewed  IGRT images reviewed    Medication list reviewed    Dwayne Kurtz MD/PhD    Dept of Radiation Oncology  [facility]

## 2020-06-26 ENCOUNTER — APPOINTMENT (OUTPATIENT)
Dept: RADIATION ONCOLOGY | Facility: CLINIC | Age: 60
End: 2020-06-26
Attending: RADIOLOGY
Payer: COMMERCIAL

## 2020-06-26 PROCEDURE — 77386 ZZH IMRT TREATMENT DELIVERY, COMPLEX: CPT | Performed by: RADIOLOGY

## 2020-06-29 ENCOUNTER — VIRTUAL VISIT (OUTPATIENT)
Dept: ONCOLOGY | Facility: CLINIC | Age: 60
End: 2020-06-29
Attending: INTERNAL MEDICINE
Payer: COMMERCIAL

## 2020-06-29 ENCOUNTER — HOSPITAL ENCOUNTER (OUTPATIENT)
Dept: NUTRITION | Facility: CLINIC | Age: 60
End: 2020-06-29
Attending: RADIOLOGY
Payer: COMMERCIAL

## 2020-06-29 ENCOUNTER — APPOINTMENT (OUTPATIENT)
Dept: RADIATION ONCOLOGY | Facility: CLINIC | Age: 60
End: 2020-06-29
Attending: RADIOLOGY
Payer: COMMERCIAL

## 2020-06-29 ENCOUNTER — APPOINTMENT (OUTPATIENT)
Dept: LAB | Facility: CLINIC | Age: 60
End: 2020-06-29
Attending: INTERNAL MEDICINE
Payer: COMMERCIAL

## 2020-06-29 VITALS
WEIGHT: 184.5 LBS | OXYGEN SATURATION: 99 % | BODY MASS INDEX: 24.99 KG/M2 | DIASTOLIC BLOOD PRESSURE: 82 MMHG | SYSTOLIC BLOOD PRESSURE: 138 MMHG | HEIGHT: 72 IN | TEMPERATURE: 97.3 F | HEART RATE: 71 BPM | RESPIRATION RATE: 16 BRPM

## 2020-06-29 VITALS
OXYGEN SATURATION: 99 % | BODY MASS INDEX: 25.02 KG/M2 | DIASTOLIC BLOOD PRESSURE: 82 MMHG | HEART RATE: 71 BPM | SYSTOLIC BLOOD PRESSURE: 138 MMHG | RESPIRATION RATE: 16 BRPM | TEMPERATURE: 97.3 F | WEIGHT: 184.5 LBS

## 2020-06-29 DIAGNOSIS — C06.0 SQUAMOUS CELL CANCER OF BUCCAL MUCOSA (H): ICD-10-CM

## 2020-06-29 DIAGNOSIS — C06.0 SQUAMOUS CELL CANCER OF BUCCAL MUCOSA (H): Primary | ICD-10-CM

## 2020-06-29 LAB
ALBUMIN SERPL-MCNC: 3.8 G/DL (ref 3.4–5)
ALP SERPL-CCNC: 63 U/L (ref 40–150)
ALT SERPL W P-5'-P-CCNC: 17 U/L (ref 0–70)
ANION GAP SERPL CALCULATED.3IONS-SCNC: 1 MMOL/L (ref 3–14)
AST SERPL W P-5'-P-CCNC: 10 U/L (ref 0–45)
BASOPHILS # BLD AUTO: 0 10E9/L (ref 0–0.2)
BASOPHILS NFR BLD AUTO: 0 %
BILIRUB SERPL-MCNC: 0.4 MG/DL (ref 0.2–1.3)
BUN SERPL-MCNC: 20 MG/DL (ref 7–30)
CALCIUM SERPL-MCNC: 9.2 MG/DL (ref 8.5–10.1)
CHLORIDE SERPL-SCNC: 103 MMOL/L (ref 94–109)
CO2 SERPL-SCNC: 32 MMOL/L (ref 20–32)
CREAT SERPL-MCNC: 1 MG/DL (ref 0.66–1.25)
DIFFERENTIAL METHOD BLD: ABNORMAL
EOSINOPHIL # BLD AUTO: 0 10E9/L (ref 0–0.7)
EOSINOPHIL NFR BLD AUTO: 0.8 %
ERYTHROCYTE [DISTWIDTH] IN BLOOD BY AUTOMATED COUNT: 12.1 % (ref 10–15)
GFR SERPL CREATININE-BSD FRML MDRD: 82 ML/MIN/{1.73_M2}
GLUCOSE SERPL-MCNC: 114 MG/DL (ref 70–99)
HCT VFR BLD AUTO: 38 % (ref 40–53)
HGB BLD-MCNC: 12.4 G/DL (ref 13.3–17.7)
IMM GRANULOCYTES # BLD: 0 10E9/L (ref 0–0.4)
IMM GRANULOCYTES NFR BLD: 0.4 %
LYMPHOCYTES # BLD AUTO: 0.4 10E9/L (ref 0.8–5.3)
LYMPHOCYTES NFR BLD AUTO: 15.4 %
MAGNESIUM SERPL-MCNC: 2.3 MG/DL (ref 1.6–2.3)
MCH RBC QN AUTO: 28.8 PG (ref 26.5–33)
MCHC RBC AUTO-ENTMCNC: 32.6 G/DL (ref 31.5–36.5)
MCV RBC AUTO: 88 FL (ref 78–100)
MONOCYTES # BLD AUTO: 0.3 10E9/L (ref 0–1.3)
MONOCYTES NFR BLD AUTO: 13.4 %
NEUTROPHILS # BLD AUTO: 1.7 10E9/L (ref 1.6–8.3)
NEUTROPHILS NFR BLD AUTO: 70 %
NRBC # BLD AUTO: 0 10*3/UL
NRBC BLD AUTO-RTO: 0 /100
PLATELET # BLD AUTO: 114 10E9/L (ref 150–450)
POTASSIUM SERPL-SCNC: 4.2 MMOL/L (ref 3.4–5.3)
PROT SERPL-MCNC: 7.3 G/DL (ref 6.8–8.8)
RBC # BLD AUTO: 4.31 10E12/L (ref 4.4–5.9)
SODIUM SERPL-SCNC: 136 MMOL/L (ref 133–144)
WBC # BLD AUTO: 2.5 10E9/L (ref 4–11)

## 2020-06-29 PROCEDURE — 96375 TX/PRO/DX INJ NEW DRUG ADDON: CPT

## 2020-06-29 PROCEDURE — 85025 COMPLETE CBC W/AUTO DIFF WBC: CPT | Performed by: INTERNAL MEDICINE

## 2020-06-29 PROCEDURE — 25800030 ZZH RX IP 258 OP 636: Mod: ZF | Performed by: INTERNAL MEDICINE

## 2020-06-29 PROCEDURE — 80053 COMPREHEN METABOLIC PANEL: CPT | Performed by: INTERNAL MEDICINE

## 2020-06-29 PROCEDURE — 99214 OFFICE O/P EST MOD 30 MIN: CPT | Mod: 95 | Performed by: NURSE PRACTITIONER

## 2020-06-29 PROCEDURE — 96367 TX/PROPH/DG ADDL SEQ IV INF: CPT

## 2020-06-29 PROCEDURE — 96413 CHEMO IV INFUSION 1 HR: CPT

## 2020-06-29 PROCEDURE — 96415 CHEMO IV INFUSION ADDL HR: CPT

## 2020-06-29 PROCEDURE — 25000128 H RX IP 250 OP 636: Mod: ZF | Performed by: INTERNAL MEDICINE

## 2020-06-29 PROCEDURE — 83735 ASSAY OF MAGNESIUM: CPT | Performed by: INTERNAL MEDICINE

## 2020-06-29 PROCEDURE — 77386 ZZH IMRT TREATMENT DELIVERY, COMPLEX: CPT | Performed by: RADIOLOGY

## 2020-06-29 RX ORDER — PALONOSETRON 0.05 MG/ML
0.25 INJECTION, SOLUTION INTRAVENOUS ONCE
Status: COMPLETED | OUTPATIENT
Start: 2020-06-29 | End: 2020-06-29

## 2020-06-29 RX ADMIN — MAGNESIUM SULFATE HEPTAHYDRATE: 500 INJECTION, SOLUTION INTRAMUSCULAR; INTRAVENOUS at 11:12

## 2020-06-29 RX ADMIN — SODIUM CHLORIDE 1000 ML: 9 INJECTION, SOLUTION INTRAVENOUS at 09:23

## 2020-06-29 RX ADMIN — CISPLATIN 220 MG: 1 INJECTION, SOLUTION INTRAVENOUS at 11:11

## 2020-06-29 RX ADMIN — DEXAMETHASONE SODIUM PHOSPHATE: 10 INJECTION, SOLUTION INTRAMUSCULAR; INTRAVENOUS at 09:30

## 2020-06-29 RX ADMIN — PALONOSETRON HYDROCHLORIDE 0.25 MG: 0.25 INJECTION, SOLUTION INTRAVENOUS at 09:28

## 2020-06-29 ASSESSMENT — PAIN SCALES - GENERAL
PAINLEVEL: MILD PAIN (3)
PAINLEVEL: MILD PAIN (2)

## 2020-06-29 ASSESSMENT — MIFFLIN-ST. JEOR: SCORE: 1695.02

## 2020-06-29 NOTE — PROGRESS NOTES
Infusion Nursing Note:  Norris Reyes presents today for Cycle 1 Day 22 Cisplatin.    Patient seen by provider today: Yes: CHRISTIE Hernandez DNP   present during visit today: Not Applicable.    Note:   Decadron refilled.    Intravenous Access:  Peripheral IV placed.    Treatment Conditions:  Lab Results   Component Value Date    HGB 12.4 06/29/2020     Lab Results   Component Value Date    WBC 2.5 06/29/2020      Lab Results   Component Value Date    ANEU 1.7 06/29/2020     Lab Results   Component Value Date     06/29/2020      Lab Results   Component Value Date     06/29/2020                   Lab Results   Component Value Date    POTASSIUM 4.2 06/29/2020           Lab Results   Component Value Date    MAG 2.3 06/29/2020            Lab Results   Component Value Date    CR 1.00 06/29/2020                   Lab Results   Component Value Date    OLGA LIDIA 9.2 06/29/2020                Lab Results   Component Value Date    BILITOTAL 0.4 06/29/2020           Lab Results   Component Value Date    ALBUMIN 3.8 06/29/2020                    Lab Results   Component Value Date    ALT 17 06/29/2020           Lab Results   Component Value Date    AST 10 06/29/2020       Results reviewed, labs MET treatment parameters, ok to proceed with treatment.      Post Infusion Assessment:  Patient tolerated infusion without incident.  Blood return noted pre and post infusion.  No evidence of extravasations.  Access discontinued per protocol.       Discharge Plan:   Patient and/or family verbalized understanding of discharge instructions and all questions answered.  AVS to patient via Views[employer].  Patient will return 7/20 for next appointment for his last chemo with radiation as scheduled daily.   Departure Mode: Ambulatory.    Frida Bustos RN

## 2020-06-29 NOTE — PROGRESS NOTES
"Norris Reyes is a 59 year old male who is being evaluated via a billable telephone visit.      The patient has been notified of following:     \"This telephone visit will be conducted via a call between you and your physician/provider. We have found that certain health care needs can be provided without the need for a physical exam.  This service lets us provide the care you need with a short phone conversation.  If a prescription is necessary we can send it directly to your pharmacy.  If lab work is needed we can place an order for that and you can then stop by our lab to have the test done at a later time.    Telephone visits are billed at different rates depending on your insurance coverage. During this emergency period, for some insurers they may be billed the same as an in-person visit.  Please reach out to your insurance provider with any questions.    If during the course of the call the physician/provider feels a telephone visit is not appropriate, you will not be charged for this service.\"    Patient has given verbal consent for Telephone visit?  Yes    What phone number would you like to be contacted at? [phone]    How would you like to obtain your AVS? Damir Moscoso LPN    Phone call duration: 16 minutes    HOMER Espinosa CNP      "

## 2020-06-29 NOTE — NURSING NOTE
Chief Complaint   Patient presents with     Blood Draw     Labs drawn via PIV by RN in lab. VS taken.      Labs drawn via peripheral IV. Vital signs taken.   Gilda Nieves RN

## 2020-06-29 NOTE — LETTER
6/29/2020         RE: Norris Reyes  [address]  [place] [address]        Dear Colleague,    Thank you for referring your patient, Norris Reyes, to the [facility]. Please see a copy of my visit note below.      Reason for Visit: f/u SCC of the L buccal mucosa    Oncology HPI:   Norris Reyes is a 59 year old man with a PMH of leukoplakia, HTN, HLD. 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  He underwent resection with L neck dissection and R radial freeflap graft on 4/23/20.  His course was complicated by wound bleeding in his mouth. He had complications of wound dehiscence in the L buccal mucosa after oral feeding. He is was NPO and receiving tube feeds.  He met with Dr. Kurtz and Dr. Leonard and initiated concurrent chemoradiation. He started radiation on 6/3 and started HD cisplatin 6/8/20. Of note his NG tube was removed and he is trying to avoid port/PEG placement.      He was called today for weekly follow-up on treatment.     Interval history: Norris is feeling ok today. Has noted continued pain on the left side of his tongue. Not sure about mucositis or tissue sloughing. Is using salt/soda rinses. Tried MMW once. Rates pain 2-3/10, but increases to 5-6/10 when eating/talking. Has ongoing tinnitus, mild, but persistent. No hearing loss, but he is worried about that . No change to the skin on the neck. No change to the bump under the chin. Weight is pretty stable, down 1-2 lb overall since starting treatment. Taking in protein drinks in addition to regular food. Has irregular BMs, worse on the week after chemotherapy. Used smooth move tea with improvement. No fevers/chills, cough, sob, cp, palpitation. Urination wnl. No edema, rash, bruising. No neuropathy.    Current Outpatient Medications   Medication Sig Dispense Refill     acetaminophen (TYLENOL) 325 MG tablet 3 tablets (975 mg) by Oral or Feeding Tube route every 8 hours as  "needed for mild pain 60 tablet 1     aspirin (ASA) 325 MG tablet 1 tablet (325 mg) by Oral or Feeding Tube route daily 40 tablet 0     chlorhexidine (PERIDEX) 0.12 % solution Swish and spit 15 mLs in mouth 4 times daily 473 mL 1     dexamethasone (DECADRON) 4 MG tablet Take 2 tablets (8 mg) by mouth daily (with breakfast) for 3 days Start the day after chemotherapy. 6 tablet 2     diphenhydrAMINE (BENADRYL) 25 MG tablet Take 25 mg by mouth nightly as needed for sleep       diphenhydrAMINE HCl (BENADRYL ALLERGY PO)        fenofibrate (TRIGLIDE/LOFIBRA) 160 MG tablet Take 160 mg by mouth daily       Homeopathic Products (FRANKINCENSE UPLIFTING) OIL Apply topically 2 times daily To outside of cheek       LORazepam (ATIVAN) 0.5 MG tablet Take 1 tablet (0.5 mg) by mouth every 4 hours as needed (Anxiety, Nausea/Vomiting or Sleep) (Patient not taking: Reported on 6/22/2020) 30 tablet 2     magic mouthwash suspension (diphenhydrAMINE, lidocaine, aluminum-magnesium & simethicone) Swish and swallow 10 mLs in mouth every 6 hours as needed for mouth sores 360 mL 3     mineral oil-hydrophilic petrolatum (AQUAPHOR) external ointment Apply topically every 8 hours To neck incisions 396 g 1     multivitamins w/minerals (CERTAVITE) liquid 15 mLs by Per Feeding Tube route daily Take when on tube feeds 236 mL 1     order for DME Equipment being ordered: Wound care supplies, 1 each daily x 14 days  Xeroform occlusive gauze 5\" x 9\"  Telfa non-adherent pad 8\" x 3\"  Kerlix bandage roll 4-1/2\" x 4-1/8 yd  ACE wrap, 4 inch (4 total)    Diagnosis: oral cancer 14 days 1     order for DME Equipment being ordered: Nasogastric bolus tube feeding supplies  Formula: TwoCal HN, 5 cans per day  Gravity feeding bags  60 mL syringes  IV pole    Treatment Diagnosis: oral cancer 14 days 1     polyethylene glycol (MIRALAX) 17 g packet Take 17 g by mouth daily as needed for constipation 14 packet 0     prochlorperazine (COMPAZINE) 10 MG tablet Take 1 " tablet (10 mg) by mouth every 6 hours as needed (Nausea/Vomiting) (Patient not taking: Reported on 6/22/2020) 30 tablet 2     protein modular (PROSOURCE TF) LIQD 1 packet by Per Feeding Tube route daily 14 packet 1     White Petrolatum-Mineral Oil (REFRESH P.M.) OINT Apply 1-2 drops to left eye at night 1 Tube 1        No Known Allergies      Objective: There were no vitals taken for this visit.  Wt Readings from Last 4 Encounters:   06/25/20 83.9 kg (185 lb)   06/18/20 84.2 kg (185 lb 9.6 oz)   06/11/20 88.5 kg (195 lb)   06/08/20 86.2 kg (190 lb)     Alert, oriented, in no apparent distress    Labs: Results for MONSE MANZANARES (MRN [id number]) as of 6/29/2020 11:39   Ref. Range 6/29/2020 07:30   Sodium Latest Ref Range: 133 - 144 mmol/L 136   Potassium Latest Ref Range: 3.4 - 5.3 mmol/L 4.2   Chloride Latest Ref Range: 94 - 109 mmol/L 103   Carbon Dioxide Latest Ref Range: 20 - 32 mmol/L 32   Urea Nitrogen Latest Ref Range: 7 - 30 mg/dL 20   Creatinine Latest Ref Range: 0.66 - 1.25 mg/dL 1.00   GFR Estimate Latest Ref Range: >60 mL/min/1.73_m2 82   GFR Estimate If Black Latest Ref Range: >60 mL/min/1.73_m2 >90   Calcium Latest Ref Range: 8.5 - 10.1 mg/dL 9.2   Anion Gap Latest Ref Range: 3 - 14 mmol/L 1 (L)   Magnesium Latest Ref Range: 1.6 - 2.3 mg/dL 2.3   Albumin Latest Ref Range: 3.4 - 5.0 g/dL 3.8   Protein Total Latest Ref Range: 6.8 - 8.8 g/dL 7.3   Bilirubin Total Latest Ref Range: 0.2 - 1.3 mg/dL 0.4   Alkaline Phosphatase Latest Ref Range: 40 - 150 U/L 63   ALT Latest Ref Range: 0 - 70 U/L 17   AST Latest Ref Range: 0 - 45 U/L 10   Glucose Latest Ref Range: 70 - 99 mg/dL 114 (H)   WBC Latest Ref Range: 4.0 - 11.0 10e9/L 2.5 (L)   Hemoglobin Latest Ref Range: 13.3 - 17.7 g/dL 12.4 (L)   Hematocrit Latest Ref Range: 40.0 - 53.0 % 38.0 (L)   Platelet Count Latest Ref Range: 150 - 450 10e9/L 114 (L)   RBC Count Latest Ref Range: 4.4 - 5.9 10e12/L 4.31 (L)   MCV Latest Ref Range: 78 - 100 fl 88   MCH Latest  Ref Range: 26.5 - 33.0 pg 28.8   MCHC Latest Ref Range: 31.5 - 36.5 g/dL 32.6   RDW Latest Ref Range: 10.0 - 15.0 % 12.1   Diff Method Unknown Automated Method   % Neutrophils Latest Units: % 70.0   % Lymphocytes Latest Units: % 15.4   % Monocytes Latest Units: % 13.4   % Eosinophils Latest Units: % 0.8   % Basophils Latest Units: % 0.0   % Immature Granulocytes Latest Units: % 0.4   Nucleated RBCs Latest Ref Range: 0 /100 0   Absolute Neutrophil Latest Ref Range: 1.6 - 8.3 10e9/L 1.7   Absolute Lymphocytes Latest Ref Range: 0.8 - 5.3 10e9/L 0.4 (L)   Absolute Monocytes Latest Ref Range: 0.0 - 1.3 10e9/L 0.3   Absolute Eosinophils Latest Ref Range: 0.0 - 0.7 10e9/L 0.0   Absolute Basophils Latest Ref Range: 0.0 - 0.2 10e9/L 0.0   Abs Immature Granulocytes Latest Ref Range: 0 - 0.4 10e9/L 0.0   Absolute Nucleated RBC Unknown 0.0       Imaging: n/a    Impression/plan:   SCC of the buccal mucosa, stage NY (cT2 cN2b cM0) s/p resection of the oral cavity with L neck dissection, R radical free flap reconstruction  -initiated on concurrent chemoradiation with HD cisplatin. Started radiation on 6/3/20, cisplatin on 6/8/20  -tolerating fairly well with anticipated fatigue, had nausea the week after infusion, mild/persistent tinnitus  -due for day 22 treatment today. Reviewed his tinnitus and risk of this worsening. He felt comfortable proceed with treatment today, but stated he may opt to stop treatment after this cycle if the hearing is worse. Reviewed the importance of completing his treatment. Discussed strategies we use to decrease hearing risk if he has progressive tinnitus including switching to carbo or low dose cisplatin. Will get an audiogram prior to the 3rd dose of HD cisplatin so that we can discuss how to adjust the future chemotherapy if he has documented hearing loss  -has f/u with an  KARLI weekly during treatment, will need f/u with Dr. Leonard 3 months post completion of chemoradiation with a  "PET/CT      Mucositis/pain s/t surgery and radiation  -continue salt/soda rinses, tylenol 1 gm TID. Has MMW, but not using it regularly  -has oxycodone from surgery, but not wanting to use it and feels his pain is reasonably controlled     Trismus s/p surgery  -working with SLP    Tinnitus-s/t cisplatin-see discussion above    Hx of submental node, was evaluated by US thought to be post-surgical fluid collections    FEN:  -eating/drinking well. Maintaining weight. Opted to avoid PEG placement. Will monitor closely. Lytes/Cr stable    Nausea s/t chemotherapy  -should take dexamethasone again after cisplatin--reviewed to take with food, may also use tums prn  -aloxi/dex, emend IV premeds  -use compazine/lorazepam prn nausea    Constipation  -improved with Smooth Move tea, advised that the constipation may worsen with the antiemetics used again today.    HTN  -holding losartan, BP stable      Norris Reyes is a 59 year old male who is being evaluated via a billable telephone visit.      The patient has been notified of following:     \"This telephone visit will be conducted via a call between you and your physician/provider. We have found that certain health care needs can be provided without the need for a physical exam.  This service lets us provide the care you need with a short phone conversation.  If a prescription is necessary we can send it directly to your pharmacy.  If lab work is needed we can place an order for that and you can then stop by our lab to have the test done at a later time.    Telephone visits are billed at different rates depending on your insurance coverage. During this emergency period, for some insurers they may be billed the same as an in-person visit.  Please reach out to your insurance provider with any questions.    If during the course of the call the physician/provider feels a telephone visit is not appropriate, you will not be charged for this service.\"    Patient has given verbal " consent for Telephone visit?  Yes    What phone number would you like to be contacted at? [phone]    How would you like to obtain your AVS? Damir Moscoso LPN    Phone call duration: 16 minutes    HOMER Espinosa CNP        Again, thank you for allowing me to participate in the care of your patient.        Sincerely,        HOMER Espinosa CNP

## 2020-06-29 NOTE — PROGRESS NOTES
Reason for Visit: f/u SCC of the L buccal mucosa    Oncology HPI:   Norris Reyes is a 59 year old man with a PMH of leukoplakia, HTN, HLD. 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  He underwent resection with L neck dissection and R radial freeflap graft on 4/23/20.  His course was complicated by wound bleeding in his mouth. He had complications of wound dehiscence in the L buccal mucosa after oral feeding. He is was NPO and receiving tube feeds.  He met with Dr. Kurtz and Dr. Leonard and initiated concurrent chemoradiation. He started radiation on 6/3 and started HD cisplatin 6/8/20. Of note his NG tube was removed and he is trying to avoid port/PEG placement.      He was called today for weekly follow-up on treatment.     Interval history: Norris is feeling ok today. Has noted continued pain on the left side of his tongue. Not sure about mucositis or tissue sloughing. Is using salt/soda rinses. Tried MMW once. Rates pain 2-3/10, but increases to 5-6/10 when eating/talking. Has ongoing tinnitus, mild, but persistent. No hearing loss, but he is worried about that . No change to the skin on the neck. No change to the bump under the chin. Weight is pretty stable, down 1-2 lb overall since starting treatment. Taking in protein drinks in addition to regular food. Has irregular BMs, worse on the week after chemotherapy. Used smooth move tea with improvement. No fevers/chills, cough, sob, cp, palpitation. Urination wnl. No edema, rash, bruising. No neuropathy.    Current Outpatient Medications   Medication Sig Dispense Refill     acetaminophen (TYLENOL) 325 MG tablet 3 tablets (975 mg) by Oral or Feeding Tube route every 8 hours as needed for mild pain 60 tablet 1     aspirin (ASA) 325 MG tablet 1 tablet (325 mg) by Oral or Feeding Tube route daily 40 tablet 0     chlorhexidine (PERIDEX) 0.12 % solution Swish and spit 15 mLs in mouth 4 times daily 473 mL 1     dexamethasone  "(DECADRON) 4 MG tablet Take 2 tablets (8 mg) by mouth daily (with breakfast) for 3 days Start the day after chemotherapy. 6 tablet 2     diphenhydrAMINE (BENADRYL) 25 MG tablet Take 25 mg by mouth nightly as needed for sleep       diphenhydrAMINE HCl (BENADRYL ALLERGY PO)        fenofibrate (TRIGLIDE/LOFIBRA) 160 MG tablet Take 160 mg by mouth daily       Homeopathic Products (FRANKINCENSE UPLIFTING) OIL Apply topically 2 times daily To outside of cheek       LORazepam (ATIVAN) 0.5 MG tablet Take 1 tablet (0.5 mg) by mouth every 4 hours as needed (Anxiety, Nausea/Vomiting or Sleep) (Patient not taking: Reported on 6/22/2020) 30 tablet 2     magic mouthwash suspension (diphenhydrAMINE, lidocaine, aluminum-magnesium & simethicone) Swish and swallow 10 mLs in mouth every 6 hours as needed for mouth sores 360 mL 3     mineral oil-hydrophilic petrolatum (AQUAPHOR) external ointment Apply topically every 8 hours To neck incisions 396 g 1     multivitamins w/minerals (CERTAVITE) liquid 15 mLs by Per Feeding Tube route daily Take when on tube feeds 236 mL 1     order for DME Equipment being ordered: Wound care supplies, 1 each daily x 14 days  Xeroform occlusive gauze 5\" x 9\"  Telfa non-adherent pad 8\" x 3\"  Kerlix bandage roll 4-1/2\" x 4-1/8 yd  ACE wrap, 4 inch (4 total)    Diagnosis: oral cancer 14 days 1     order for DME Equipment being ordered: Nasogastric bolus tube feeding supplies  Formula: TwoCal HN, 5 cans per day  Gravity feeding bags  60 mL syringes  IV pole    Treatment Diagnosis: oral cancer 14 days 1     polyethylene glycol (MIRALAX) 17 g packet Take 17 g by mouth daily as needed for constipation 14 packet 0     prochlorperazine (COMPAZINE) 10 MG tablet Take 1 tablet (10 mg) by mouth every 6 hours as needed (Nausea/Vomiting) (Patient not taking: Reported on 6/22/2020) 30 tablet 2     protein modular (PROSOURCE TF) LIQD 1 packet by Per Feeding Tube route daily 14 packet 1     White Petrolatum-Mineral Oil " (REFRESH P.M.) OINT Apply 1-2 drops to left eye at night 1 Tube 1        No Known Allergies      Objective: There were no vitals taken for this visit.  Wt Readings from Last 4 Encounters:   06/25/20 83.9 kg (185 lb)   06/18/20 84.2 kg (185 lb 9.6 oz)   06/11/20 88.5 kg (195 lb)   06/08/20 86.2 kg (190 lb)     Alert, oriented, in no apparent distress    Labs: Results for MONSE MANZANARES (MRN [id number]) as of 6/29/2020 11:39   Ref. Range 6/29/2020 07:30   Sodium Latest Ref Range: 133 - 144 mmol/L 136   Potassium Latest Ref Range: 3.4 - 5.3 mmol/L 4.2   Chloride Latest Ref Range: 94 - 109 mmol/L 103   Carbon Dioxide Latest Ref Range: 20 - 32 mmol/L 32   Urea Nitrogen Latest Ref Range: 7 - 30 mg/dL 20   Creatinine Latest Ref Range: 0.66 - 1.25 mg/dL 1.00   GFR Estimate Latest Ref Range: >60 mL/min/1.73_m2 82   GFR Estimate If Black Latest Ref Range: >60 mL/min/1.73_m2 >90   Calcium Latest Ref Range: 8.5 - 10.1 mg/dL 9.2   Anion Gap Latest Ref Range: 3 - 14 mmol/L 1 (L)   Magnesium Latest Ref Range: 1.6 - 2.3 mg/dL 2.3   Albumin Latest Ref Range: 3.4 - 5.0 g/dL 3.8   Protein Total Latest Ref Range: 6.8 - 8.8 g/dL 7.3   Bilirubin Total Latest Ref Range: 0.2 - 1.3 mg/dL 0.4   Alkaline Phosphatase Latest Ref Range: 40 - 150 U/L 63   ALT Latest Ref Range: 0 - 70 U/L 17   AST Latest Ref Range: 0 - 45 U/L 10   Glucose Latest Ref Range: 70 - 99 mg/dL 114 (H)   WBC Latest Ref Range: 4.0 - 11.0 10e9/L 2.5 (L)   Hemoglobin Latest Ref Range: 13.3 - 17.7 g/dL 12.4 (L)   Hematocrit Latest Ref Range: 40.0 - 53.0 % 38.0 (L)   Platelet Count Latest Ref Range: 150 - 450 10e9/L 114 (L)   RBC Count Latest Ref Range: 4.4 - 5.9 10e12/L 4.31 (L)   MCV Latest Ref Range: 78 - 100 fl 88   MCH Latest Ref Range: 26.5 - 33.0 pg 28.8   MCHC Latest Ref Range: 31.5 - 36.5 g/dL 32.6   RDW Latest Ref Range: 10.0 - 15.0 % 12.1   Diff Method Unknown Automated Method   % Neutrophils Latest Units: % 70.0   % Lymphocytes Latest Units: % 15.4   % Monocytes  Latest Units: % 13.4   % Eosinophils Latest Units: % 0.8   % Basophils Latest Units: % 0.0   % Immature Granulocytes Latest Units: % 0.4   Nucleated RBCs Latest Ref Range: 0 /100 0   Absolute Neutrophil Latest Ref Range: 1.6 - 8.3 10e9/L 1.7   Absolute Lymphocytes Latest Ref Range: 0.8 - 5.3 10e9/L 0.4 (L)   Absolute Monocytes Latest Ref Range: 0.0 - 1.3 10e9/L 0.3   Absolute Eosinophils Latest Ref Range: 0.0 - 0.7 10e9/L 0.0   Absolute Basophils Latest Ref Range: 0.0 - 0.2 10e9/L 0.0   Abs Immature Granulocytes Latest Ref Range: 0 - 0.4 10e9/L 0.0   Absolute Nucleated RBC Unknown 0.0       Imaging: n/a    Impression/plan:   SCC of the buccal mucosa, stage NY (cT2 cN2b cM0) s/p resection of the oral cavity with L neck dissection, R radical free flap reconstruction  -initiated on concurrent chemoradiation with HD cisplatin. Started radiation on 6/3/20, cisplatin on 6/8/20  -tolerating fairly well with anticipated fatigue, had nausea the week after infusion, mild/persistent tinnitus  -due for day 22 treatment today. Reviewed his tinnitus and risk of this worsening. He felt comfortable proceed with treatment today, but stated he may opt to stop treatment after this cycle if the hearing is worse. Reviewed the importance of completing his treatment. Discussed strategies we use to decrease hearing risk if he has progressive tinnitus including switching to carbo or low dose cisplatin. Will get an audiogram prior to the 3rd dose of HD cisplatin so that we can discuss how to adjust the future chemotherapy if he has documented hearing loss  -has f/u with an  KARLI weekly during treatment, will need f/u with Dr. Leonard 3 months post completion of chemoradiation with a PET/CT      Mucositis/pain s/t surgery and radiation  -continue salt/soda rinses, tylenol 1 gm TID. Has MMW, but not using it regularly  -has oxycodone from surgery, but not wanting to use it and feels his pain is reasonably controlled     Trismus s/p  surgery  -working with SLP    Tinnitus-s/t cisplatin-see discussion above    Hx of submental node, was evaluated by US thought to be post-surgical fluid collections    FEN:  -eating/drinking well. Maintaining weight. Opted to avoid PEG placement. Will monitor closely. Lytes/Cr stable    Nausea s/t chemotherapy  -should take dexamethasone again after cisplatin--reviewed to take with food, may also use tums prn  -aloxi/dex, emend IV premeds  -use compazine/lorazepam prn nausea    Constipation  -improved with Smooth Move tea, advised that the constipation may worsen with the antiemetics used again today.    HTN  -holding losartan, BP stable

## 2020-06-30 ENCOUNTER — APPOINTMENT (OUTPATIENT)
Dept: RADIATION ONCOLOGY | Facility: CLINIC | Age: 60
End: 2020-06-30
Attending: RADIOLOGY
Payer: COMMERCIAL

## 2020-06-30 PROCEDURE — 77386 ZZH IMRT TREATMENT DELIVERY, COMPLEX: CPT | Performed by: RADIOLOGY

## 2020-06-30 PROCEDURE — 77336 RADIATION PHYSICS CONSULT: CPT | Performed by: RADIOLOGY

## 2020-07-01 ENCOUNTER — APPOINTMENT (OUTPATIENT)
Dept: RADIATION ONCOLOGY | Facility: CLINIC | Age: 60
End: 2020-07-01
Attending: RADIOLOGY
Payer: COMMERCIAL

## 2020-07-01 ENCOUNTER — THERAPY VISIT (OUTPATIENT)
Dept: SPEECH THERAPY | Facility: CLINIC | Age: 60
End: 2020-07-01
Payer: COMMERCIAL

## 2020-07-01 DIAGNOSIS — C06.0 SQUAMOUS CELL CANCER OF BUCCAL MUCOSA (H): Primary | ICD-10-CM

## 2020-07-01 DIAGNOSIS — R13.12 OROPHARYNGEAL DYSPHAGIA: ICD-10-CM

## 2020-07-01 PROCEDURE — 77386 ZZH IMRT TREATMENT DELIVERY, COMPLEX: CPT | Performed by: RADIOLOGY

## 2020-07-01 NOTE — PROGRESS NOTES
Nutrition Services:     RD called Norris for scheduled phone follow-up on 6/29.  LVM indicating reason for phone call. No return call received.   RD called his wife, Lisa, today.  She tells me that Norris has been struggling with his intake since chemo on Monday as nausea is the main barrier to eating.    He has been relying mostly on oatmeal (made with oil and half/half) and premire protein shakes.  She tells me that his intake and appetite are slowly returning as the nausea subsides. She admits that Norris is reluctant to take nausea meds as he is 'not a big med taker'.    RD reviewed importance of keeping anti-nausea meds on board for prevention. Reviwed natural anti-nausea aids such as ginger (ginger lozenges, chew, ginger ale etc).      He has lost ~11 lbs x past 3 weeks (6%)  Wt Readings from Last 10 Encounters:   06/29/20 83.7 kg (184 lb 8 oz)   06/29/20 83.7 kg (184 lb 8 oz)   06/25/20 83.9 kg (185 lb)   06/18/20 84.2 kg (185 lb 9.6 oz)   06/11/20 88.5 kg (195 lb)   06/08/20 86.2 kg (190 lb)   05/20/20 88 kg (194 lb)   05/08/20 86.6 kg (191 lb)   04/27/20 92.8 kg (204 lb 9.4 oz)   04/15/20 90.7 kg (200 lb)         Provided suggestions for ways to continue adding calories and protein to snacks, shakes and meals.   Encouraged to take his Healios supplement as well.    RD will follow-up in 1-2 weeks  - Weight trends  - PO intake and tolerance versus need for EN     Grace Xiao RDN, [facility]  [phone]

## 2020-07-01 NOTE — PROGRESS NOTES
RADIATION ONCOLOGY WEEKLY ON TREATMENT VISIT   Encounter Date: 2020    Patient Name: Norris Reyes  MRN: [id number]  : 1960     Disease and Stage: pT4a N3b M0 squamous cell carcinoma of the left buccal mucosa  Treatment Site: Left oral cavity and neck  Current Dose/Planned Total Dose: 4400 / 6600 cGy  Daily Fraction Size: 200 cGy/day, 5 times/week  Concurrent Chemotherapy: Yes  Drug and Frequency: Cisplatin (100 mg/m2) q3 weeks    Treatment Summary:    6/3/2020: Initiation of radiotherapy    2020: Weekly RT visit. Current dose of 400 cGy. Tolerating treatment well.    2020: Cycle 1, day 1 of high-dose cisplatin chemotherapy    2020: Weekly RT visit. Current dose of 1400 cGy. Presents with new submental nodularity.    2020: Weekly RT visit. Current dose of 2400 cGy. Moderate to severe oral tongue pain.    2020: Weekly RT visit. Current dose of 3400 cGy. Improved oral cavity pain.    2020: Cycle 1, day 22 of high-dose cisplatin chemotherapy    2020: Weekly RT visit. Current dose of 4400 cGy. Stable oral cavity pain. Stable weight.    ED visits/Hospitalizations:  None    Missed Treatments:  None    Subjective: Mr. Reyes presents to clinic today for his weekly on-treatment visit. He reports that he continues to tolerate treatment reasonably well and has no pressing concerns or complaints on examination. He describes mild to moderate oral cavity/oropharyngeal pain which he currently rates as a 1-2/10 in severity. He is taking PRN acetaminophen and magic mouthwash for his symptoms. He does have oxycodone available but has not felt that he has needed to start this currently. He is eating a soft diet and his weight has been stable over the past week.     ROS:   Constitutional  Pain (0-10): 2 (mouth), 1 (throat), 1 (skin)  Fatigue: Mild     CNS  Headaches: None    ENT  Mucositis: Moderate    Cardiopulmonary  Dyspnea: None    GI  Nausea/vomiting: Mild nausea without  vomiting    Nutrition  PEG: No  Diet: Soft    Integumentary  Dermatitis: Moderate    Objective:   Current weight: 85.3 kg  Last week's weight: 83.9 kg  Starting weight: 88 kg    BP: 175/94 (sitting), 165/78 (standing)  Pulse: 63 (sitting), 71 (standing)     General: 59 year old gentleman seated comfortably in an examination chair in [facility]  HEENT: NC/AT. EOMI. No rhinorrhea or epistaxis. Moderately dry mucus membranes. Healthy-appearing left buccal free flap. Confluent mucositis involving the left buccal mucosa, extending onto the left gingiva and onto the left lateral oral tongue. Mild bleeding around the left oral commissure. No thrush.  Pulmonary: No wheezing, stridor or respiratory distress  Skin: Moderate erythema involving the left lateral face and neck. No desquamation.    Treatment-related toxicities (CTCAE v5.0):  Dermatitis: Grade 2  Mucositis: Grade 2    Assessment:    Mr. 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 He is receiving adjuvant chemoradiotherapy for improved locoregional disease control. He is tolerating treatment reasonably well with the anticipated acute treatment-induced dermatitis and mucositis.    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:    Continue chemoradiotherapy    Pain management:    Continue acetaminophen and magic mouthwash as needed for mild to moderate pain    Recommended starting oxycodone 5 mg q6h as needed for moderate to severe pain    Fluids/Electrolytes/Nutrition:    Continue diet as tolerated    Continue ongoing follow-up with oncology dietician for evaluation and cares throughout treatment    Dermatitis:    Continue BID moisturizer use to the skin of the left face and neck    Mucositis:     Pain management as described above    Continue frequent salt/soda rinses     Dry eyes:    Continue frequent saline drops to the left  eye    Continue Refresh PM drops to the left eye at bedtime     Mosaiq chart and setup information reviewed  IGRT images reviewed    Medication list reviewed    Dwayne Kurtz MD/PhD    Dept of Radiation Oncology  [facility]

## 2020-07-02 ENCOUNTER — OFFICE VISIT (OUTPATIENT)
Dept: RADIATION ONCOLOGY | Facility: CLINIC | Age: 60
End: 2020-07-02
Attending: RADIOLOGY
Payer: COMMERCIAL

## 2020-07-02 VITALS
WEIGHT: 188 LBS | HEART RATE: 63 BPM | BODY MASS INDEX: 25.49 KG/M2 | DIASTOLIC BLOOD PRESSURE: 94 MMHG | SYSTOLIC BLOOD PRESSURE: 175 MMHG

## 2020-07-02 DIAGNOSIS — C06.0 SQUAMOUS CELL CANCER OF BUCCAL MUCOSA (H): Primary | ICD-10-CM

## 2020-07-02 PROCEDURE — 77386 ZZH IMRT TREATMENT DELIVERY, COMPLEX: CPT | Performed by: RADIOLOGY

## 2020-07-03 ENCOUNTER — APPOINTMENT (OUTPATIENT)
Dept: RADIATION ONCOLOGY | Facility: CLINIC | Age: 60
End: 2020-07-03
Attending: RADIOLOGY
Payer: COMMERCIAL

## 2020-07-03 ENCOUNTER — TELEPHONE (OUTPATIENT)
Dept: AUDIOLOGY | Facility: CLINIC | Age: 60
End: 2020-07-03

## 2020-07-03 PROCEDURE — 77386 ZZH IMRT TREATMENT DELIVERY, COMPLEX: CPT | Performed by: RADIOLOGY

## 2020-07-06 ENCOUNTER — APPOINTMENT (OUTPATIENT)
Dept: RADIATION ONCOLOGY | Facility: CLINIC | Age: 60
End: 2020-07-06
Attending: RADIOLOGY
Payer: COMMERCIAL

## 2020-07-06 PROCEDURE — 77386 ZZH IMRT TREATMENT DELIVERY, COMPLEX: CPT | Performed by: RADIOLOGY

## 2020-07-07 ENCOUNTER — APPOINTMENT (OUTPATIENT)
Dept: RADIATION ONCOLOGY | Facility: CLINIC | Age: 60
End: 2020-07-07
Attending: RADIOLOGY
Payer: COMMERCIAL

## 2020-07-07 ENCOUNTER — VIRTUAL VISIT (OUTPATIENT)
Dept: ONCOLOGY | Facility: CLINIC | Age: 60
End: 2020-07-07
Attending: PHYSICIAN ASSISTANT
Payer: COMMERCIAL

## 2020-07-07 DIAGNOSIS — C06.0 SQUAMOUS CELL CANCER OF BUCCAL MUCOSA (H): Primary | ICD-10-CM

## 2020-07-07 DIAGNOSIS — C06.0 SQUAMOUS CELL CANCER OF BUCCAL MUCOSA (H): ICD-10-CM

## 2020-07-07 LAB
ALBUMIN SERPL-MCNC: 3.3 G/DL (ref 3.4–5)
ALP SERPL-CCNC: 64 U/L (ref 40–150)
ALT SERPL W P-5'-P-CCNC: 26 U/L (ref 0–70)
ANION GAP SERPL CALCULATED.3IONS-SCNC: 5 MMOL/L (ref 3–14)
AST SERPL W P-5'-P-CCNC: 15 U/L (ref 0–45)
BASOPHILS # BLD AUTO: 0 10E9/L (ref 0–0.2)
BASOPHILS NFR BLD AUTO: 0 %
BILIRUB SERPL-MCNC: 0.5 MG/DL (ref 0.2–1.3)
BUN SERPL-MCNC: 22 MG/DL (ref 7–30)
CALCIUM SERPL-MCNC: 9.1 MG/DL (ref 8.5–10.1)
CHLORIDE SERPL-SCNC: 98 MMOL/L (ref 94–109)
CO2 SERPL-SCNC: 32 MMOL/L (ref 20–32)
CREAT SERPL-MCNC: 1.1 MG/DL (ref 0.66–1.25)
DIFFERENTIAL METHOD BLD: ABNORMAL
EOSINOPHIL # BLD AUTO: 0 10E9/L (ref 0–0.7)
EOSINOPHIL NFR BLD AUTO: 0.6 %
ERYTHROCYTE [DISTWIDTH] IN BLOOD BY AUTOMATED COUNT: 12.1 % (ref 10–15)
GFR SERPL CREATININE-BSD FRML MDRD: 73 ML/MIN/{1.73_M2}
GLUCOSE SERPL-MCNC: 108 MG/DL (ref 70–99)
HCT VFR BLD AUTO: 34.4 % (ref 40–53)
HGB BLD-MCNC: 11.6 G/DL (ref 13.3–17.7)
IMM GRANULOCYTES # BLD: 0 10E9/L (ref 0–0.4)
IMM GRANULOCYTES NFR BLD: 0.6 %
LYMPHOCYTES # BLD AUTO: 0.2 10E9/L (ref 0.8–5.3)
LYMPHOCYTES NFR BLD AUTO: 7.4 %
MAGNESIUM SERPL-MCNC: 2.1 MG/DL (ref 1.6–2.3)
MCH RBC QN AUTO: 29 PG (ref 26.5–33)
MCHC RBC AUTO-ENTMCNC: 33.7 G/DL (ref 31.5–36.5)
MCV RBC AUTO: 86 FL (ref 78–100)
MONOCYTES # BLD AUTO: 0.5 10E9/L (ref 0–1.3)
MONOCYTES NFR BLD AUTO: 14.8 %
NEUTROPHILS # BLD AUTO: 2.5 10E9/L (ref 1.6–8.3)
NEUTROPHILS NFR BLD AUTO: 76.6 %
NRBC # BLD AUTO: 0 10*3/UL
NRBC BLD AUTO-RTO: 0 /100
PLATELET # BLD AUTO: 109 10E9/L (ref 150–450)
POTASSIUM SERPL-SCNC: 3.3 MMOL/L (ref 3.4–5.3)
PROT SERPL-MCNC: 6.6 G/DL (ref 6.8–8.8)
RBC # BLD AUTO: 4 10E12/L (ref 4.4–5.9)
SODIUM SERPL-SCNC: 135 MMOL/L (ref 133–144)
WBC # BLD AUTO: 3.2 10E9/L (ref 4–11)

## 2020-07-07 PROCEDURE — 99215 OFFICE O/P EST HI 40 MIN: CPT | Mod: 95 | Performed by: PHYSICIAN ASSISTANT

## 2020-07-07 PROCEDURE — 77336 RADIATION PHYSICS CONSULT: CPT | Performed by: RADIOLOGY

## 2020-07-07 PROCEDURE — 80053 COMPREHEN METABOLIC PANEL: CPT | Performed by: PHYSICIAN ASSISTANT

## 2020-07-07 PROCEDURE — 36415 COLL VENOUS BLD VENIPUNCTURE: CPT | Performed by: PHYSICIAN ASSISTANT

## 2020-07-07 PROCEDURE — 85025 COMPLETE CBC W/AUTO DIFF WBC: CPT | Performed by: PHYSICIAN ASSISTANT

## 2020-07-07 PROCEDURE — 83735 ASSAY OF MAGNESIUM: CPT | Performed by: PHYSICIAN ASSISTANT

## 2020-07-07 PROCEDURE — 77386 ZZH IMRT TREATMENT DELIVERY, COMPLEX: CPT | Performed by: RADIOLOGY

## 2020-07-07 NOTE — PROGRESS NOTES
Reason for Visit: f/u SCC of the L buccal mucosa    Oncology HPI:   Norris Reyes is a 59 year old man with a PMH of leukoplakia, HTN, HLD. 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  He underwent resection with L neck dissection and R radial freeflap graft on 4/23/20.  His course was complicated by wound bleeding in his mouth. He had complications of wound dehiscence in the L buccal mucosa after oral feeding. He is was NPO and receiving tube feeds.  He met with Dr. Kurtz and Dr. Leonard and initiated concurrent chemoradiation. He started radiation on 6/3 and started HD cisplatin 6/8/20. Of note his NG tube was removed and he is trying to avoid port/PEG placement.      He was called today for weekly follow-up on treatment.     Interval history:   -Has sores on his bottom lip and some sloughing. Pain is 1-2/10 at rest and then 5-6/10 when eating or talking. Pretty much the same pain as last week. More swelling. Has not needed MMW Using APAP for pain, though unsure if helpful. Does not want to take anything stronger  -face is red though no skin breaks. Using aquaphor religiously   -BM every other day. Did have one episodes of vomiting last week. Nausea resolved now  -no desire to eat though eating; eggs, oatmeal, protein drink (1-2/day, 30g)  -not as sore in his mouth on Monday (weekend breaks help)  -Energy has been decreased. Will walk outside a couple times a day  -Drinking about 6-12 oz bottles   -Has intermittent tinnitus, starting to fade  -No respiratory symptoms  -Occasional chills. No fevers    -chronic right hand neuropathy     ROS: 10 point ROS neg other than the symptoms noted above in the HPI.      Current Outpatient Medications   Medication Sig Dispense Refill     diphenhydrAMINE HCl (BENADRYL ALLERGY PO)        fenofibrate (TRIGLIDE/LOFIBRA) 160 MG tablet Take 160 mg by mouth daily       LORazepam (ATIVAN) 0.5 MG tablet Take 1 tablet (0.5 mg) by mouth every  4 hours as needed (Anxiety, Nausea/Vomiting or Sleep) 30 tablet 2     magic mouthwash suspension (diphenhydrAMINE, lidocaine, aluminum-magnesium & simethicone) Swish and swallow 10 mLs in mouth every 6 hours as needed for mouth sores 360 mL 3     mineral oil-hydrophilic petrolatum (AQUAPHOR) external ointment Apply topically every 8 hours To neck incisions 396 g 1     multivitamins w/minerals (CERTAVITE) liquid 15 mLs by Per Feeding Tube route daily Take when on tube feeds 236 mL 1     prochlorperazine (COMPAZINE) 10 MG tablet Take 1 tablet (10 mg) by mouth every 6 hours as needed (Nausea/Vomiting) 30 tablet 2     White Petrolatum-Mineral Oil (REFRESH P.M.) OINT Apply 1-2 drops to left eye at night 1 Tube 1        No Known Allergies      Objective: There were no vitals taken for this visit.  Wt Readings from Last 4 Encounters:   07/02/20 85.3 kg (188 lb)   06/29/20 83.7 kg (184 lb 8 oz)   06/29/20 83.7 kg (184 lb 8 oz)   06/25/20 83.9 kg (185 lb)     Video physical exam  General: Patient appears well in no acute distress. Normal/obese/thin body habitus.  Skin: No visualized rash or lesions on visualized skin  Eyes: EOMI, no erythema, sclera icterus or discharge noted  Resp: Appears to be breathing comfortably without accessory muscle usage, speaking in full sentences, no cough  MSK: Appears to have normal range of motion based on visualized movements  Neurologic: No apparent tremors, facial movements symmetric  Psych: affect normal, fatigued, alert and oriented    The rest of a comprehensive physical examination is deferred due to PHE (public health emergency) video restrictions      Labs:    7/7/2020 10:52   Sodium 135   Potassium 3.3 (L)   Chloride 98   Carbon Dioxide 32   Urea Nitrogen 22   Creatinine 1.10   GFR Estimate 73   GFR Estimate If Black 85   Calcium 9.1   Anion Gap 5   Magnesium 2.1   Albumin 3.3 (L)   Protein Total 6.6 (L)   Bilirubin Total 0.5   Alkaline Phosphatase 64   ALT 26   AST 15   Glucose 108  (H)   WBC 3.2 (L)   Hemoglobin 11.6 (L)   Hematocrit 34.4 (L)   Platelet Count 109 (L)   RBC Count 4.00 (L)   MCV 86   MCH 29.0   MCHC 33.7   RDW 12.1   Diff Method Automated Method   % Neutrophils 76.6   % Lymphocytes 7.4   % Monocytes 14.8   % Eosinophils 0.6   % Basophils 0.0   % Immature Granulocytes 0.6   Nucleated RBCs 0   Absolute Neutrophil 2.5   Absolute Lymphocytes 0.2 (L)   Absolute Monocytes 0.5   Absolute Eosinophils 0.0   Absolute Basophils 0.0   Abs Immature Granulocytes 0.0   Absolute Nucleated RBC 0.0       Imaging: n/a    Impression/plan:   SCC of the buccal mucosa, stage NY (cT2 cN2b cM0) s/p resection of the oral cavity with L neck dissection, R radical free flap reconstruction  -initiated on concurrent chemoradiation with HD cisplatin. Started radiation on 6/3/20, cisplatin on 6/8/20. Had Day 22 on 6/29  -tolerating fairly well with anticipated fatigue, nausea/emesis the week after infusion, mild/persistent tinnitus which worsened again after chemo though now improving and intermittent   -Due to concern for hearing loss, will get an audiogram prior to the 3rd dose of HD cisplatin so that we can discuss how to adjust the future chemotherapy if he has documented hearing loss  -encouraged him to increase activity as tolerated to help with fatigue   -has f/u with an KARLI weekly during treatment, will need f/u with Dr. Leonard 3 months post completion of chemoradiation with a PET/CT    Mucositis/pain s/t surgery and radiation  -continue salt/soda rinses, tylenol 1 gm TID. Has MMW, but not using it regularly. Encourage him to use this if he is having difficulties with intake   -has oxycodone from surgery though still not wanting to use this. Can use prn. Pain worse when eating and talking     FEN:  -has anorexia, though continues to eating/drinking well. Weight has been stable. Opted to avoid PEG placement.   -potassium is mildly low today. Will have him increase potassium rich foods in his diet (sent  list via [employer])  -if PO intake is decreasing, should increase amount of protein shakes, currently doing 1-2 day    Tinnitus-s/t cisplatin-see discussion above. Will get repeat audiogram prior to Cycle 3 (as been requested)    Nausea s/t chemotherapy  -aloxi/dex, emend IV premeds  -intially had nausea and one episode of emesis last week. Currently asymptomatic. Can continue to use compazine/lorazepam prn    Constipation  -improved with Smooth Move tea. Currently having stool every other day. He feels this is manageable. Continue smooth move tea prn    HTN  -holding losartan, BP was elevated last week in radiation. Monitoring as it was on incidence. If it continues to be elevated, would need to restart BP med    Trismus s/p surgery  -working with SLP    Hx of submental node, was evaluated by US thought to be post-surgical fluid collections    Video-Visit Details    Type of service:  Video Visit    Video Start Time: 3:57 PM  Video End Time: 4:24 PM    Originating Location (pt. Location): Home    Distant Location (provider location):  Provider's Home    Platform used for Video Visit: Adalid Carr PA-C

## 2020-07-07 NOTE — LETTER
7/7/2020         RE: Norris Reyes  [address]  [place] [id number]        Dear Colleague,    Thank you for referring your patient, Norris Reyes, to the [facility]. Please see a copy of my visit note below.    Norris Reyes is a 59 year old male who is being evaluated via a billable video visit.        Secondary Video Option (Kere[employer]), send text message to:  [phone]    I have reviewed and updated the patient's allergies and medication list. Patient was asked if they had any patient reported vital signs to present, if yes, please see documented vitals.  Patient was also asked for their current weight and height, if presented, documented in vitals.    Concerns: Patient has no new concerns.      Refills: None      Raz Rodriguez, EMT            Reason for Visit: f/u SCC of the L buccal mucosa    Oncology HPI:   Norris Reyes is a 59 year old man with a PMH of leukoplakia, HTN, HLD. 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  He underwent resection with L neck dissection and R radial freeflap graft on 4/23/20.  His course was complicated by wound bleeding in his mouth. He had complications of wound dehiscence in the L buccal mucosa after oral feeding. He is was NPO and receiving tube feeds.  He met with Dr. Kurtz and Dr. Leonard and initiated concurrent chemoradiation. He started radiation on 6/3 and started HD cisplatin 6/8/20. Of note his NG tube was removed and he is trying to avoid port/PEG placement.      He was called today for weekly follow-up on treatment.     Interval history:   -Has sores on his bottom lip and some sloughing. Pain is 1-2/10 at rest and then 5-6/10 when eating or talking. Pretty much the same pain as last week. More swelling. Has not needed MMW Using APAP for pain, though unsure if helpful. Does not want to take anything stronger  -face is red though no skin breaks. Using aquaphor religiously   -BM every  other day. Did have one episodes of vomiting last week. Nausea resolved now  -no desire to eat though eating; eggs, oatmeal, protein drink (1-2/day, 30g)  -not as sore in his mouth on Monday (weekend breaks help)  -Energy has been decreased. Will walk outside a couple times a day  -Drinking about 6-12 oz bottles   -Has intermittent tinnitus, starting to fade  -No respiratory symptoms  -Occasional chills. No fevers    -chronic right hand neuropathy     ROS: 10 point ROS neg other than the symptoms noted above in the HPI.      Current Outpatient Medications   Medication Sig Dispense Refill     diphenhydrAMINE HCl (BENADRYL ALLERGY PO)        fenofibrate (TRIGLIDE/LOFIBRA) 160 MG tablet Take 160 mg by mouth daily       LORazepam (ATIVAN) 0.5 MG tablet Take 1 tablet (0.5 mg) by mouth every 4 hours as needed (Anxiety, Nausea/Vomiting or Sleep) 30 tablet 2     magic mouthwash suspension (diphenhydrAMINE, lidocaine, aluminum-magnesium & simethicone) Swish and swallow 10 mLs in mouth every 6 hours as needed for mouth sores 360 mL 3     mineral oil-hydrophilic petrolatum (AQUAPHOR) external ointment Apply topically every 8 hours To neck incisions 396 g 1     multivitamins w/minerals (CERTAVITE) liquid 15 mLs by Per Feeding Tube route daily Take when on tube feeds 236 mL 1     prochlorperazine (COMPAZINE) 10 MG tablet Take 1 tablet (10 mg) by mouth every 6 hours as needed (Nausea/Vomiting) 30 tablet 2     White Petrolatum-Mineral Oil (REFRESH P.M.) OINT Apply 1-2 drops to left eye at night 1 Tube 1        No Known Allergies      Objective: There were no vitals taken for this visit.  Wt Readings from Last 4 Encounters:   07/02/20 85.3 kg (188 lb)   06/29/20 83.7 kg (184 lb 8 oz)   06/29/20 83.7 kg (184 lb 8 oz)   06/25/20 83.9 kg (185 lb)     Video physical exam  General: Patient appears well in no acute distress. Normal/obese/thin body habitus.  Skin: No visualized rash or lesions on visualized skin  Eyes: EOMI, no erythema,  sclera icterus or discharge noted  Resp: Appears to be breathing comfortably without accessory muscle usage, speaking in full sentences, no cough  MSK: Appears to have normal range of motion based on visualized movements  Neurologic: No apparent tremors, facial movements symmetric  Psych: affect normal, fatigued, alert and oriented    The rest of a comprehensive physical examination is deferred due to PHE (public health emergency) video restrictions      Labs:    7/7/2020 10:52   Sodium 135   Potassium 3.3 (L)   Chloride 98   Carbon Dioxide 32   Urea Nitrogen 22   Creatinine 1.10   GFR Estimate 73   GFR Estimate If Black 85   Calcium 9.1   Anion Gap 5   Magnesium 2.1   Albumin 3.3 (L)   Protein Total 6.6 (L)   Bilirubin Total 0.5   Alkaline Phosphatase 64   ALT 26   AST 15   Glucose 108 (H)   WBC 3.2 (L)   Hemoglobin 11.6 (L)   Hematocrit 34.4 (L)   Platelet Count 109 (L)   RBC Count 4.00 (L)   MCV 86   MCH 29.0   MCHC 33.7   RDW 12.1   Diff Method Automated Method   % Neutrophils 76.6   % Lymphocytes 7.4   % Monocytes 14.8   % Eosinophils 0.6   % Basophils 0.0   % Immature Granulocytes 0.6   Nucleated RBCs 0   Absolute Neutrophil 2.5   Absolute Lymphocytes 0.2 (L)   Absolute Monocytes 0.5   Absolute Eosinophils 0.0   Absolute Basophils 0.0   Abs Immature Granulocytes 0.0   Absolute Nucleated RBC 0.0       Imaging: n/a    Impression/plan:   SCC of the buccal mucosa, stage NY (cT2 cN2b cM0) s/p resection of the oral cavity with L neck dissection, R radical free flap reconstruction  -initiated on concurrent chemoradiation with HD cisplatin. Started radiation on 6/3/20, cisplatin on 6/8/20. Had Day 22 on 6/29  -tolerating fairly well with anticipated fatigue, nausea/emesis the week after infusion, mild/persistent tinnitus which worsened again after chemo though now improving and intermittent   -Due to concern for hearing loss, will get an audiogram prior to the 3rd dose of HD cisplatin so that we can discuss how to  adjust the future chemotherapy if he has documented hearing loss  -encouraged him to increase activity as tolerated to help with fatigue   -has f/u with an KARLI weekly during treatment, will need f/u with Dr. Leonard 3 months post completion of chemoradiation with a PET/CT    Mucositis/pain s/t surgery and radiation  -continue salt/soda rinses, tylenol 1 gm TID. Has MMW, but not using it regularly. Encourage him to use this if he is having difficulties with intake   -has oxycodone from surgery though still not wanting to use this. Can use prn. Pain worse when eating and talking     FEN:  -has anorexia, though continues to eating/drinking well. Weight has been stable. Opted to avoid PEG placement.   -potassium is mildly low today. Will have him increase potassium rich foods in his diet (sent list via [employer])  -if PO intake is decreasing, should increase amount of protein shakes, currently doing 1-2 day    Tinnitus-s/t cisplatin-see discussion above. Will get repeat audiogram prior to Cycle 3 (as been requested)    Nausea s/t chemotherapy  -aloxi/dex, emend IV premeds  -intially had nausea and one episode of emesis last week. Currently asymptomatic. Can continue to use compazine/lorazepam prn    Constipation  -improved with Smooth Move tea. Currently having stool every other day. He feels this is manageable. Continue smooth move tea prn    HTN  -holding losartan, BP was elevated last week in radiation. Monitoring as it was on incidence. If it continues to be elevated, would need to restart BP med    Trismus s/p surgery  -working with SLP    Hx of submental node, was evaluated by US thought to be post-surgical fluid collections    Video-Visit Details    Type of service:  Video Visit    Video Start Time: 3:57 PM  Video End Time: 4:24 PM    Originating Location (pt. Location): Home    Distant Location (provider location):  Provider's Home    Platform used for Video Visit: Adalid Carr PA-C

## 2020-07-07 NOTE — PATIENT INSTRUCTIONS
Patient Education     Potassium-Rich Foods  The normal adult diet usually contains 2,000 mg to 4,000 mg of potassium per day. More potassium is needed when you lose too much potassium from your body. This can happen if you have diarrhea or vomiting. It can also happen if you take a medicine to make you urinate more (diuretic). To increase the amount of potassium in your diet, include these high-potassium foods.     [The (*) indicates foods highest in potassium.]  Vegetables  Artichokes. Cooked 1/2 cup, 200 mg to 300 mg*  Asparagus. Cooked 1/2 cup, 200 mg to 300 mg  Beans. White, red, roblero cooked 1/2 cup, 300 mg to 500 mg*  Beets. Cooked 1/2 cup, 200 mg to 300 mg  Broccoli. Cooked or raw 1 cup, 200 mg to 500 mg*  [place] sprouts. Cooked 1/2 cup, 200 mg to 300 mg  Cabbage. Raw 1 cup, 100 mg to 200 mg  Carrots. Raw or cooked 1/2 cup, 100 mg to 200 mg  Celery. Raw 1 cup, 200 mg to 300 mg  Lima beans. Fresh or frozen 1/2 cup, 300 mg to 500 mg*   Mushrooms. Raw or cooked 1/2 cup, 100 mg to 300 mg  Peas. Cooked 1/2 cup, 150 mg to 250 mg   Potatoes. Baked 1 medium, 500 mg to 900 mg*   Spinach. Cooked 1 cup, 800 mg to 900 mg*   Spinach. Raw 2 cups, 300 mg to 400 mg *  Squash, winter. Fresh, frozen, or cooked 1/2 cup, 200 mg to 400 mg   Tomato. Fresh 1 medium, 200 mg to 300 mg   Tomato juice. Canned 1/2 cup, 200 mg to 300 mg   Fruits  Apple juice. Unsweetened 1 cup, 200 mg to 300 mg   Apricots. Canned 1/2 cup, 200 mg to 300 mg   Apricots. Dried 4 pieces, 100 mg to 200 mg   Avocado. Raw 1/2 cup, 300 mg to 400 mg*  Banana. Fresh 1 small, 300 mg to 400 mg*   Cantaloupe. Fresh 1 cup diced, 300 mg to 400 mg*   Grape juice. Unsweetened 1 cup, 200 mg to 300 mg   Honeydew melon. Fresh 1 cup diced, 300 mg to 400 mg*   Orange. Fresh 1 medium, 200 mg to 300 mg    Orange juice. Unsweetened, fresh or frozen 1/2 cup, 200 mg to 300 mg  Pineapple juice. Unsweetened 1 cup, 300 mg to 400 mg   Prune juice. Unsweetened 1/2 cup, 300 mg to 400  mg*   Prunes. Dried 5 pieces, 300 mg to 400 mg*   Strawberries. Fresh or frozen 1 cup, 200 mg to 300 mg  Meat  Red meat. Cooked 3 ounces, 100 mg to 300 mg   Seafood  Cod, flounder, halibut. Cooked 3 ounces, 100 mg to 300 mg*  [place]. Cooked, 3 ounces 300 mg to 400 mg*   Scallops. Cooked 3 ounces, 200 mg to 300 mg*  Shrimp. Cooked 3/4 cup, 100 mg to 200 mg   Tuna. Fresh or canned 3/4 cup, 200 mg to 500 mg   Date Last Reviewed: 10/1/2016    3737-3436 [employer] (formerly [employer]). [address] [place] [address], [place] [address]. All rights reserved. This information is not intended as a substitute for professional medical care. Always follow your healthcare professional's instructions.

## 2020-07-07 NOTE — PROGRESS NOTES
"Norris Reyes is a 59 year old male who is being evaluated via a billable video visit.      The patient has been notified of following:     \"This video visit will be conducted via a call between you and your physician/provider. We have found that certain health care needs can be provided without the need for an in-person physical exam.  This service lets us provide the care you need with a video conversation.  If a prescription is necessary we can send it directly to your pharmacy.  If lab work is needed we can place an order for that and you can then stop by our lab to have the test done at a later time.    Video visits are billed at different rates depending on your insurance coverage.  Please reach out to your insurance provider with any questions.    If during the course of the call the physician/provider feels a video visit is not appropriate, you will not be charged for this service.\"    Patient has given verbal consent for Video visit? Yes    How would you like to obtain your AVS? MyChart     Will anyone else be joining your video visit? No          Secondary Video Option ([employer]), send text message to:  [phone]    I have reviewed and updated the patient's allergies and medication list. Patient was asked if they had any patient reported vital signs to present, if yes, please see documented vitals.  Patient was also asked for their current weight and height, if presented, documented in vitals.    Concerns: Patient has no new concerns.      Refills: None      Raz Rodriguez, EMT        "

## 2020-07-08 ENCOUNTER — APPOINTMENT (OUTPATIENT)
Dept: RADIATION ONCOLOGY | Facility: CLINIC | Age: 60
End: 2020-07-08
Attending: RADIOLOGY
Payer: COMMERCIAL

## 2020-07-08 PROCEDURE — 77386 ZZH IMRT TREATMENT DELIVERY, COMPLEX: CPT | Performed by: RADIOLOGY

## 2020-07-09 ENCOUNTER — APPOINTMENT (OUTPATIENT)
Dept: RADIATION ONCOLOGY | Facility: CLINIC | Age: 60
End: 2020-07-09
Attending: RADIOLOGY
Payer: COMMERCIAL

## 2020-07-09 VITALS
DIASTOLIC BLOOD PRESSURE: 91 MMHG | WEIGHT: 181 LBS | SYSTOLIC BLOOD PRESSURE: 148 MMHG | HEART RATE: 68 BPM | BODY MASS INDEX: 24.54 KG/M2

## 2020-07-09 DIAGNOSIS — C06.0 SQUAMOUS CELL CANCER OF BUCCAL MUCOSA (H): Primary | ICD-10-CM

## 2020-07-09 PROCEDURE — 77386 ZZH IMRT TREATMENT DELIVERY, COMPLEX: CPT | Performed by: RADIOLOGY

## 2020-07-09 NOTE — PROGRESS NOTES
RADIATION ONCOLOGY WEEKLY ON TREATMENT VISIT   Encounter Date: 2020    Patient Name: Norris Reyes  MRN: [id number]  : 1960     Disease and Stage: pT4a N3b M0 squamous cell carcinoma of the left buccal mucosa  Treatment Site: Left oral cavity and neck  Current Dose/Planned Total Dose: 5400 / 6600 cGy  Daily Fraction Size: 200 cGy/day, 5 times/week  Concurrent Chemotherapy: Yes  Drug and Frequency: Cisplatin (100 mg/m2) q3 weeks    Treatment Summary:    6/3/2020: Initiation of radiotherapy    2020: Weekly RT visit. Current dose of 400 cGy. Tolerating treatment well.    2020: Cycle 1, day 1 of high-dose cisplatin chemotherapy    2020: Weekly RT visit. Current dose of 1400 cGy. Presents with new submental nodularity.    2020: Weekly RT visit. Current dose of 2400 cGy. Moderate to severe oral tongue pain.    2020: Weekly RT visit. Current dose of 3400 cGy. Improved oral cavity pain.    2020: Cycle 1, day 22 of high-dose cisplatin chemotherapy    2020: Weekly RT visit. Current dose of 4400 cGy. Stable oral cavity pain. Stable weight.    2020: Weekly RT visit. Current dose of 5400 cGy. Stable oral cavity pain. Weight down approximately 3 kg.    ED visits/Hospitalizations:  None    Missed Treatments:  None    Subjective: Mr. Reyes presents to clinic today for his weekly on-treatment visit. He describes persistent oral cavity pain which he rates as a 2-3/10 in severity. He continues to use PRN acetaminophen and Magic mouthwash and, while he does have oxycodone available, has been resistant to using this medication and reports today that his symptoms are well-controlled without the use of narcotic pain medications. He continues to eat a soft diet without difficulty although his appetite is suppressed from his ongoing treatment and his weight is down approximately 3 kg over the past week. He is otherwise compliant with his skin and oral cares and his remaining ROS are  unchanged from last week.    ROS:   Constitutional  Pain (0-10): 3 (mouth), 2 (throat), 1 (skin)  Fatigue: Mild     CNS  Headaches: None    ENT  Mucositis: Moderate    Cardiopulmonary  Dyspnea: None    GI  Nausea/vomiting: None    Nutrition  PEG: No  Diet: Soft    Integumentary  Dermatitis: Moderate    Objective:   Current weight: 82.1 kg  Last week's weight: 85 kg  Starting weight: 88 kg    BP: 148/91 (sitting), 116/75 (standing)  Pulse: 68 (sitting), 70 (standing)     General: 59 year old gentleman seated in an examination chair in [facility]  HEENT: NC/AT. EOMI. No rhinorrhea or epistaxis. Dry mucous membranes with thickened oral cavity secretions. Extensive mucositis involving the left buccal surfaces, left lower gingiva and left lateral oral tongue. No evidence of thrush.  Pulmonary: No wheezing, stridor or respiratory distress  Skin: Brisk erythema involving the left lateral face and neck. Patchy desquamation confined to the skin folds.    Treatment-related toxicities (CTCAE v5.0):  Dermatitis: Grade 2  Mucositis: Grade 2    Assessment:    Mr. 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 He is receiving adjuvant chemoradiotherapy for improved locoregional disease control. He is developing the anticipated progressive radiation-induced dermatitis and mucositis..    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:    Complete chemoradiotherapy next Friday (7/17/2020)    Follow-up with NP in radiation oncology clinic in 1-2 weeks     Follow-up with MD in radiation oncology clinic in 6 weeks    Pain management:    Continue acetaminophen and magic mouthwash as needed for mild to moderate pain    Once again discussed starting oxycodone 5 mg q6h as needed for moderate to severe pain    Fluids/Electrolytes/Nutrition:    Continue diet as tolerated    Continue ongoing follow-up with oncology  dietician for evaluation and cares throughout treatment    Dermatitis:    Continue BID/TID moisturizer use to the skin of the left face and neck    Mucositis:    Pain management as described above    Continue frequent salt/soda rinses     Dry eyes:    Continue frequent saline drops to the left eye    Continue Refresh PM drops to the left eye at bedtime     Mosaiq chart and setup information reviewed  IGRT images reviewed    Medication list reviewed    Dwayne Kurtz MD/PhD    Dept of Radiation Oncology  [facility]

## 2020-07-10 ENCOUNTER — APPOINTMENT (OUTPATIENT)
Dept: RADIATION ONCOLOGY | Facility: CLINIC | Age: 60
End: 2020-07-10
Attending: RADIOLOGY
Payer: COMMERCIAL

## 2020-07-10 PROCEDURE — 77386 ZZH IMRT TREATMENT DELIVERY, COMPLEX: CPT | Performed by: RADIOLOGY

## 2020-07-13 ENCOUNTER — APPOINTMENT (OUTPATIENT)
Dept: RADIATION ONCOLOGY | Facility: CLINIC | Age: 60
End: 2020-07-13
Attending: RADIOLOGY
Payer: COMMERCIAL

## 2020-07-13 PROCEDURE — 77386 ZZH IMRT TREATMENT DELIVERY, COMPLEX: CPT | Performed by: RADIOLOGY

## 2020-07-14 ENCOUNTER — APPOINTMENT (OUTPATIENT)
Dept: RADIATION ONCOLOGY | Facility: CLINIC | Age: 60
End: 2020-07-14
Attending: RADIOLOGY
Payer: COMMERCIAL

## 2020-07-14 ENCOUNTER — VIRTUAL VISIT (OUTPATIENT)
Dept: ONCOLOGY | Facility: CLINIC | Age: 60
End: 2020-07-14
Attending: INTERNAL MEDICINE
Payer: COMMERCIAL

## 2020-07-14 DIAGNOSIS — C06.0 SQUAMOUS CELL CANCER OF BUCCAL MUCOSA (H): Primary | ICD-10-CM

## 2020-07-14 DIAGNOSIS — C06.0 SQUAMOUS CELL CANCER OF BUCCAL MUCOSA (H): ICD-10-CM

## 2020-07-14 LAB
ALBUMIN SERPL-MCNC: 3.2 G/DL (ref 3.4–5)
ALP SERPL-CCNC: 60 U/L (ref 40–150)
ALT SERPL W P-5'-P-CCNC: 16 U/L (ref 0–70)
ANION GAP SERPL CALCULATED.3IONS-SCNC: 5 MMOL/L (ref 3–14)
AST SERPL W P-5'-P-CCNC: 9 U/L (ref 0–45)
BASOPHILS # BLD AUTO: 0 10E9/L (ref 0–0.2)
BASOPHILS NFR BLD AUTO: 0.3 %
BILIRUB SERPL-MCNC: 0.3 MG/DL (ref 0.2–1.3)
BUN SERPL-MCNC: 18 MG/DL (ref 7–30)
CALCIUM SERPL-MCNC: 8.9 MG/DL (ref 8.5–10.1)
CHLORIDE SERPL-SCNC: 103 MMOL/L (ref 94–109)
CO2 SERPL-SCNC: 30 MMOL/L (ref 20–32)
CREAT SERPL-MCNC: 0.95 MG/DL (ref 0.66–1.25)
DIFFERENTIAL METHOD BLD: ABNORMAL
EOSINOPHIL # BLD AUTO: 0 10E9/L (ref 0–0.7)
EOSINOPHIL NFR BLD AUTO: 0.3 %
ERYTHROCYTE [DISTWIDTH] IN BLOOD BY AUTOMATED COUNT: 12.3 % (ref 10–15)
GFR SERPL CREATININE-BSD FRML MDRD: 88 ML/MIN/{1.73_M2}
GLUCOSE SERPL-MCNC: 108 MG/DL (ref 70–99)
HCT VFR BLD AUTO: 32 % (ref 40–53)
HGB BLD-MCNC: 10.7 G/DL (ref 13.3–17.7)
IMM GRANULOCYTES # BLD: 0 10E9/L (ref 0–0.4)
IMM GRANULOCYTES NFR BLD: 0.3 %
LYMPHOCYTES # BLD AUTO: 0.2 10E9/L (ref 0.8–5.3)
LYMPHOCYTES NFR BLD AUTO: 6.1 %
MAGNESIUM SERPL-MCNC: 2.1 MG/DL (ref 1.6–2.3)
MCH RBC QN AUTO: 29.2 PG (ref 26.5–33)
MCHC RBC AUTO-ENTMCNC: 33.4 G/DL (ref 31.5–36.5)
MCV RBC AUTO: 87 FL (ref 78–100)
MONOCYTES # BLD AUTO: 0.4 10E9/L (ref 0–1.3)
MONOCYTES NFR BLD AUTO: 11.8 %
NEUTROPHILS # BLD AUTO: 2.4 10E9/L (ref 1.6–8.3)
NEUTROPHILS NFR BLD AUTO: 81.2 %
NRBC # BLD AUTO: 0 10*3/UL
NRBC BLD AUTO-RTO: 0 /100
PLATELET # BLD AUTO: 87 10E9/L (ref 150–450)
POTASSIUM SERPL-SCNC: 3.6 MMOL/L (ref 3.4–5.3)
PROT SERPL-MCNC: 6.5 G/DL (ref 6.8–8.8)
RBC # BLD AUTO: 3.67 10E12/L (ref 4.4–5.9)
SODIUM SERPL-SCNC: 138 MMOL/L (ref 133–144)
WBC # BLD AUTO: 3 10E9/L (ref 4–11)

## 2020-07-14 PROCEDURE — 99214 OFFICE O/P EST MOD 30 MIN: CPT | Mod: 95 | Performed by: PHYSICIAN ASSISTANT

## 2020-07-14 PROCEDURE — 77386 ZZH IMRT TREATMENT DELIVERY, COMPLEX: CPT | Performed by: RADIOLOGY

## 2020-07-14 PROCEDURE — 77336 RADIATION PHYSICS CONSULT: CPT | Performed by: RADIOLOGY

## 2020-07-14 PROCEDURE — 40001009 ZZH VIDEO/TELEPHONE VISIT; NO CHARGE

## 2020-07-14 PROCEDURE — 83735 ASSAY OF MAGNESIUM: CPT | Performed by: PHYSICIAN ASSISTANT

## 2020-07-14 PROCEDURE — 85025 COMPLETE CBC W/AUTO DIFF WBC: CPT | Performed by: PHYSICIAN ASSISTANT

## 2020-07-14 PROCEDURE — 36415 COLL VENOUS BLD VENIPUNCTURE: CPT | Performed by: PHYSICIAN ASSISTANT

## 2020-07-14 PROCEDURE — 80053 COMPREHEN METABOLIC PANEL: CPT | Performed by: PHYSICIAN ASSISTANT

## 2020-07-14 NOTE — LETTER
"    7/14/2020         RE: Norris Reyes  [address]  [place] [address]        Dear Colleague,    Thank you for referring your patient, Norris Reyes, to the [facility]. Please see a copy of my visit note below.    Norris Reyes is a 59 year old male who is being evaluated via a billable video visit.        Vitals - Patient Reported  Systolic (Patient Reported): (!) 149  Diastolic (Patient Reported): (!) 91  Weight (Patient Reported): 82.1 kg (181 lb)  Height (Patient Reported): 182.9 cm (6' 0.01\")  BMI (Based on Pt Reported Ht/Wt): 24.54  Pulse (Patient Reported): 68  Pain Score: No Pain (0)    Alec Moscoso LPN          Reason for Visit: f/u SCC of the L buccal mucosa    Oncology HPI:   Norris Reyes is a 59 year old man with a PMH of leukoplakia, HTN, HLD. 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  He underwent resection with L neck dissection and R radial freeflap graft on 4/23/20.  His course was complicated by wound bleeding in his mouth. He had complications of wound dehiscence in the L buccal mucosa after oral feeding. He is was NPO and receiving tube feeds.  He met with Dr. Kurtz and Dr. Leonard and initiated concurrent chemoradiation. He started radiation on 6/3 and started HD cisplatin 6/8/20. Of note his NG tube was removed and he is trying to avoid port/PEG placement.      He was called today for weekly follow-up on treatment.     Interval history:   -pain is worse, still dependent on talking and eating. Still has not felt like he need oxycodone. Want to avoid narcotis  -intake is becoming poor. Not eating because of pain and lack of desire. Taking a protein shake every other day. Doing 3 meals a day, usually egg, mac and cheese and dinner varies. Eating ~60 grams/protein day.   -cheek becomes swollen with chewing which makes solid food hard  -drinking 60-80 oz/day  -doing s/s swishes 6-7x/day. Doing helios. Not using " MMW  -skin is erythematous, no open areas. Still using aquaphor   -No N/V. Bowel are about every other day, now soft and normal  -he is tired though still active. Moved a camper yesterday  -Continues to have tinnitus, has lost some hearing in right ear  -has had more nose bleeds    ROS: 10 point ROS neg other than the symptoms noted above in the HPI.      Current Outpatient Medications   Medication Sig Dispense Refill     diphenhydrAMINE HCl (BENADRYL ALLERGY PO)        fenofibrate (TRIGLIDE/LOFIBRA) 160 MG tablet Take 160 mg by mouth daily       LORazepam (ATIVAN) 0.5 MG tablet Take 1 tablet (0.5 mg) by mouth every 4 hours as needed (Anxiety, Nausea/Vomiting or Sleep) 30 tablet 2     magic mouthwash suspension (diphenhydrAMINE, lidocaine, aluminum-magnesium & simethicone) Swish and swallow 10 mLs in mouth every 6 hours as needed for mouth sores 360 mL 3     mineral oil-hydrophilic petrolatum (AQUAPHOR) external ointment Apply topically every 8 hours To neck incisions 396 g 1     multivitamins w/minerals (CERTAVITE) liquid 15 mLs by Per Feeding Tube route daily Take when on tube feeds 236 mL 1     prochlorperazine (COMPAZINE) 10 MG tablet Take 1 tablet (10 mg) by mouth every 6 hours as needed (Nausea/Vomiting) 30 tablet 2     White Petrolatum-Mineral Oil (REFRESH P.M.) OINT Apply 1-2 drops to left eye at night 1 Tube 1        No Known Allergies      Objective: There were no vitals taken for this visit.  Wt Readings from Last 4 Encounters:   07/09/20 82.1 kg (181 lb)   07/02/20 85.3 kg (188 lb)   06/29/20 83.7 kg (184 lb 8 oz)   06/29/20 83.7 kg (184 lb 8 oz)     Video physical exam  General: Patient appears well in no acute distress though fatigued. Normal body habitus.Voice is raspy.   Skin: No visualized rash or lesions on visualized skin. Erythematous at RT site  Eyes: EOMI, no erythema, sclera icterus or discharge noted  Resp: Appears to be breathing comfortably without accessory muscle usage, speaking in full  sentences, no cough  MSK: Appears to have normal range of motion based on visualized movements  Neurologic: No apparent tremors, facial movements symmetric  Psych: affect normal, fatigued, alert and oriented    The rest of a comprehensive physical examination is deferred due to PHE (public health emergency) video restrictions      Labs:    7/7/2020 10:52   Sodium 135   Potassium 3.3 (L)   Chloride 98   Carbon Dioxide 32   Urea Nitrogen 22   Creatinine 1.10   GFR Estimate 73   GFR Estimate If Black 85   Calcium 9.1   Anion Gap 5   Magnesium 2.1   Albumin 3.3 (L)   Protein Total 6.6 (L)   Bilirubin Total 0.5   Alkaline Phosphatase 64   ALT 26   AST 15   Glucose 108 (H)   WBC 3.2 (L)   Hemoglobin 11.6 (L)   Hematocrit 34.4 (L)   Platelet Count 109 (L)   RBC Count 4.00 (L)   MCV 86   MCH 29.0   MCHC 33.7   RDW 12.1   Diff Method Automated Method   % Neutrophils 76.6   % Lymphocytes 7.4   % Monocytes 14.8   % Eosinophils 0.6   % Basophils 0.0   % Immature Granulocytes 0.6   Nucleated RBCs 0   Absolute Neutrophil 2.5   Absolute Lymphocytes 0.2 (L)   Absolute Monocytes 0.5   Absolute Eosinophils 0.0   Absolute Basophils 0.0   Abs Immature Granulocytes 0.0   Absolute Nucleated RBC 0.0       Imaging: n/a    Impression/plan:   SCC of the buccal mucosa, stage NY (cT2 cN2b cM0) s/p resection of the oral cavity with L neck dissection, R radical free flap reconstruction  -initiated on concurrent chemoradiation with HD cisplatin. Started radiation on 6/3/20, cisplatin on 6/8/20. Had Day 22 on 6/29  -tolerated chemo with anticipated fatigue, nausea/emesis the week after infusion, mild/persistent tinnitus which worsened again after chemo though now improving and intermittent   -last RT scheduled for this Friday, though has 3rd dose of HD cisplatin scheduled for Monday. Discussed with Dr. Leonard the need for the 3rd dose without continued RT. Agreed that the 3rd dose is not needed if not getting more RT and will cancel  -will have  him follow-up next week for close symptom management and then can spread out appts if doing well  -will need f/u with Dr. Leonard 3 months post completion of chemoradiation with a PET/CT    Mucositis/pain s/t surgery and radiation  -continue salt/soda rinses, tylenol 1 gm TID. Has MMW, but not using it regularly. Continues to encourage him to use this if he is having difficulties with intake   -pain worse with intake and talking   -has oxycodone though still does not want to use narcotics. Discussed that though pain is not harmful to him, when it starts to impact intake, this can be serious. Does not need to use narcotics, though we need to find a way to continue to have adequate intake     FEN:  -has anorexia, though continues to eating/drink. Weight has been decreasing lately. Opted to avoid PEG placement and voices today that he will never allow for one to be placed  -potassium has now improved with incorporation of potassium rich foods in his diet   -since PO intake is decreasing, should increase amount of protein shakes, currently doing 1 every other day. Should aim for 2 every day. He does not like them but does understand rational. Should be trying for 100 g of protein a day. Eating other high protein foods    Tinnitus-s/t cisplatin- feel like this may be worse. Was going to get audiogram prior to Cycle 3 though now with C3 cancelled, no need for further evaluation     Nausea s/t chemotherapy  -Currently asymptomatic. Can continue to use compazine/lorazepam prn. No further chemo    HTN  -holding losartan. BP fairly stable though more elevated now. Will likely need to restart once PO intake improves    Trismus s/p surgery  -working with SLP    Video-Visit Details    Type of service:  Video Visit    Video Start Time: 1:22 PM  Video End Time: 1:52 PM    Originating Location (pt. Location): Home    Distant Location (provider location):  Provider's Home    Platform used for Video Visit: Adalid Carr  MADHU Carr PA-C

## 2020-07-14 NOTE — PROGRESS NOTES
"Norris Reyes is a 59 year old male who is being evaluated via a billable video visit.      The patient has been notified of following:     \"This video visit will be conducted via a call between you and your physician/provider. We have found that certain health care needs can be provided without the need for an in-person physical exam.  This service lets us provide the care you need with a video conversation.  If a prescription is necessary we can send it directly to your pharmacy.  If lab work is needed we can place an order for that and you can then stop by our lab to have the test done at a later time.    Video visits are billed at different rates depending on your insurance coverage.  Please reach out to your insurance provider with any questions.    If during the course of the call the physician/provider feels a video visit is not appropriate, you will not be charged for this service.\"    Patient has given verbal consent for Video visit? Yes    How would you like to obtain your AVS? MyChart     Vitals - Patient Reported  Systolic (Patient Reported): (!) 149  Diastolic (Patient Reported): (!) 91  Weight (Patient Reported): 82.1 kg (181 lb)  Height (Patient Reported): 182.9 cm (6' 0.01\")  BMI (Based on Pt Reported Ht/Wt): 24.54  Pulse (Patient Reported): 68  Pain Score: No Pain (0)    Alec Moscoso LPN      "

## 2020-07-14 NOTE — PROGRESS NOTES
Reason for Visit: f/u SCC of the L buccal mucosa    Oncology HPI:   Norris Reyes is a 59 year old man with a PMH of leukoplakia, HTN, HLD. 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  He underwent resection with L neck dissection and R radial freeflap graft on 4/23/20.  His course was complicated by wound bleeding in his mouth. He had complications of wound dehiscence in the L buccal mucosa after oral feeding. He is was NPO and receiving tube feeds.  He met with Dr. Kurtz and Dr. Leonard and initiated concurrent chemoradiation. He started radiation on 6/3 and started HD cisplatin 6/8/20. Of note his NG tube was removed and he is trying to avoid port/PEG placement.      He was called today for weekly follow-up on treatment.     Interval history:   -pain is worse, still dependent on talking and eating. Still has not felt like he need oxycodone. Want to avoid narcotis  -intake is becoming poor. Not eating because of pain and lack of desire. Taking a protein shake every other day. Doing 3 meals a day, usually egg, mac and cheese and dinner varies. Eating ~60 grams/protein day.   -cheek becomes swollen with chewing which makes solid food hard  -drinking 60-80 oz/day  -doing s/s swishes 6-7x/day. Doing helios. Not using MMW  -skin is erythematous, no open areas. Still using aquaphor   -No N/V. Bowel are about every other day, now soft and normal  -he is tired though still active. Moved a camper yesterday  -Continues to have tinnitus, has lost some hearing in right ear  -has had more nose bleeds    ROS: 10 point ROS neg other than the symptoms noted above in the HPI.      Current Outpatient Medications   Medication Sig Dispense Refill     diphenhydrAMINE HCl (BENADRYL ALLERGY PO)        fenofibrate (TRIGLIDE/LOFIBRA) 160 MG tablet Take 160 mg by mouth daily       LORazepam (ATIVAN) 0.5 MG tablet Take 1 tablet (0.5 mg) by mouth every 4 hours as needed (Anxiety, Nausea/Vomiting  or Sleep) 30 tablet 2     magic mouthwash suspension (diphenhydrAMINE, lidocaine, aluminum-magnesium & simethicone) Swish and swallow 10 mLs in mouth every 6 hours as needed for mouth sores 360 mL 3     mineral oil-hydrophilic petrolatum (AQUAPHOR) external ointment Apply topically every 8 hours To neck incisions 396 g 1     multivitamins w/minerals (CERTAVITE) liquid 15 mLs by Per Feeding Tube route daily Take when on tube feeds 236 mL 1     prochlorperazine (COMPAZINE) 10 MG tablet Take 1 tablet (10 mg) by mouth every 6 hours as needed (Nausea/Vomiting) 30 tablet 2     White Petrolatum-Mineral Oil (REFRESH P.M.) OINT Apply 1-2 drops to left eye at night 1 Tube 1        No Known Allergies      Objective: There were no vitals taken for this visit.  Wt Readings from Last 4 Encounters:   07/09/20 82.1 kg (181 lb)   07/02/20 85.3 kg (188 lb)   06/29/20 83.7 kg (184 lb 8 oz)   06/29/20 83.7 kg (184 lb 8 oz)     Video physical exam  General: Patient appears well in no acute distress though fatigued. Normal body habitus.Voice is raspy.   Skin: No visualized rash or lesions on visualized skin. Erythematous at RT site  Eyes: EOMI, no erythema, sclera icterus or discharge noted  Resp: Appears to be breathing comfortably without accessory muscle usage, speaking in full sentences, no cough  MSK: Appears to have normal range of motion based on visualized movements  Neurologic: No apparent tremors, facial movements symmetric  Psych: affect normal, fatigued, alert and oriented    The rest of a comprehensive physical examination is deferred due to PHE (public health emergency) video restrictions      Labs:    7/7/2020 10:52   Sodium 135   Potassium 3.3 (L)   Chloride 98   Carbon Dioxide 32   Urea Nitrogen 22   Creatinine 1.10   GFR Estimate 73   GFR Estimate If Black 85   Calcium 9.1   Anion Gap 5   Magnesium 2.1   Albumin 3.3 (L)   Protein Total 6.6 (L)   Bilirubin Total 0.5   Alkaline Phosphatase 64   ALT 26   AST 15   Glucose  108 (H)   WBC 3.2 (L)   Hemoglobin 11.6 (L)   Hematocrit 34.4 (L)   Platelet Count 109 (L)   RBC Count 4.00 (L)   MCV 86   MCH 29.0   MCHC 33.7   RDW 12.1   Diff Method Automated Method   % Neutrophils 76.6   % Lymphocytes 7.4   % Monocytes 14.8   % Eosinophils 0.6   % Basophils 0.0   % Immature Granulocytes 0.6   Nucleated RBCs 0   Absolute Neutrophil 2.5   Absolute Lymphocytes 0.2 (L)   Absolute Monocytes 0.5   Absolute Eosinophils 0.0   Absolute Basophils 0.0   Abs Immature Granulocytes 0.0   Absolute Nucleated RBC 0.0       Imaging: n/a    Impression/plan:   SCC of the buccal mucosa, stage NY (cT2 cN2b cM0) s/p resection of the oral cavity with L neck dissection, R radical free flap reconstruction  -initiated on concurrent chemoradiation with HD cisplatin. Started radiation on 6/3/20, cisplatin on 6/8/20. Had Day 22 on 6/29  -tolerated chemo with anticipated fatigue, nausea/emesis the week after infusion, mild/persistent tinnitus which worsened again after chemo though now improving and intermittent   -last RT scheduled for this Friday, though has 3rd dose of HD cisplatin scheduled for Monday. Discussed with Dr. Leonard the need for the 3rd dose without continued RT. Agreed that the 3rd dose is not needed if not getting more RT and will cancel  -will have him follow-up next week for close symptom management and then can spread out appts if doing well  -will need f/u with Dr. Leonard 3 months post completion of chemoradiation with a PET/CT    Mucositis/pain s/t surgery and radiation  -continue salt/soda rinses, tylenol 1 gm TID. Has MMW, but not using it regularly. Continues to encourage him to use this if he is having difficulties with intake   -pain worse with intake and talking   -has oxycodone though still does not want to use narcotics. Discussed that though pain is not harmful to him, when it starts to impact intake, this can be serious. Does not need to use narcotics, though we need to find a way to continue  to have adequate intake     FEN:  -has anorexia, though continues to eating/drink. Weight has been decreasing lately. Opted to avoid PEG placement and voices today that he will never allow for one to be placed  -potassium has now improved with incorporation of potassium rich foods in his diet   -since PO intake is decreasing, should increase amount of protein shakes, currently doing 1 every other day. Should aim for 2 every day. He does not like them but does understand rational. Should be trying for 100 g of protein a day. Eating other high protein foods    Tinnitus-s/t cisplatin- feel like this may be worse. Was going to get audiogram prior to Cycle 3 though now with C3 cancelled, no need for further evaluation     Nausea s/t chemotherapy  -Currently asymptomatic. Can continue to use compazine/lorazepam prn. No further chemo    HTN  -holding losartan. BP fairly stable though more elevated now. Will likely need to restart once PO intake improves    Trismus s/p surgery  -working with SLP    Video-Visit Details    Type of service:  Video Visit    Video Start Time: 1:22 PM  Video End Time: 1:52 PM    Originating Location (pt. Location): Home    Distant Location (provider location):  Provider's Home    Platform used for Video Visit: Adalid Carr PA-C

## 2020-07-16 ENCOUNTER — OFFICE VISIT (OUTPATIENT)
Dept: RADIATION ONCOLOGY | Facility: CLINIC | Age: 60
End: 2020-07-16
Attending: RADIOLOGY
Payer: COMMERCIAL

## 2020-07-16 VITALS
BODY MASS INDEX: 24.68 KG/M2 | WEIGHT: 182 LBS | HEART RATE: 74 BPM | DIASTOLIC BLOOD PRESSURE: 86 MMHG | SYSTOLIC BLOOD PRESSURE: 146 MMHG

## 2020-07-16 DIAGNOSIS — L30.9 DERMATITIS: ICD-10-CM

## 2020-07-16 DIAGNOSIS — C06.0 SQUAMOUS CELL CANCER OF BUCCAL MUCOSA (H): Primary | ICD-10-CM

## 2020-07-16 PROCEDURE — 77386 ZZH IMRT TREATMENT DELIVERY, COMPLEX: CPT | Performed by: RADIOLOGY

## 2020-07-16 RX ORDER — SILVER SULFADIAZINE 10 MG/G
CREAM TOPICAL DAILY
Qty: 25 G | Refills: 1 | Status: SHIPPED | OUTPATIENT
Start: 2020-07-16 | End: 2020-07-24

## 2020-07-16 NOTE — PROGRESS NOTES
RADIATION ONCOLOGY WEEKLY ON TREATMENT VISIT   Encounter Date: 2020    Patient Name: Norris Reyes  MRN: [id number]  : 1960     Disease and Stage: pT4a N3b M0 squamous cell carcinoma of the left buccal mucosa  Treatment Site: Left oral cavity and neck  Current Dose/Planned Total Dose: 6200 / 6600 cGy  Daily Fraction Size: 200 cGy/day, 5 times/week  Concurrent Chemotherapy: Yes  Drug and Frequency: Cisplatin (100 mg/m2) q3 weeks    Treatment Summary:    6/3/2020: Initiation of radiotherapy    2020: Weekly RT visit. Current dose of 400 cGy. Tolerating treatment well.    2020: Cycle 1, day 1 of high-dose cisplatin chemotherapy    2020: Weekly RT visit. Current dose of 1400 cGy. Presents with new submental nodularity.    2020: Weekly RT visit. Current dose of 2400 cGy. Moderate to severe oral tongue pain.    2020: Weekly RT visit. Current dose of 3400 cGy. Improved oral cavity pain.    2020: Cycle 1, day 22 of high-dose cisplatin chemotherapy    2020: Weekly RT visit. Current dose of 4400 cGy. Stable oral cavity pain. Stable weight.    2020: Weekly RT visit. Current dose of 5400 cGy. Stable oral cavity pain. Weight down approximately 3 kg.    2020: Weekly RT visit. Current dose of 6200 cGy. Mild oral cavity pain.     ED visits/Hospitalizations:  None    Missed Treatments:  7/15/2020: Due to machine downtime    Subjective: Mr. Reyes presents to clinic today for his weekly on-treatment visit. He describes persistent oral cavity pain which he rates today as a 2/10 in severity. He continues to use PRN acetaminophen and Magic mouthwash without use of narcotic pain medications. He continues to eat a soft diet without difficulty and his weight is up from last week. He has noticed some skin breakdown from his seatbelt rubbing on his neck. He is otherwise compliant with his skin and oral cares and his remaining ROS are unchanged from last week.    ROS:    Constitutional  Pain (0-10): 2 (mouth), 2 (throat), 1 (skin)  Fatigue: Mild     CNS  Headaches: None    ENT  Mucositis: Moderate    Cardiopulmonary  Dyspnea: None    GI  Nausea/vomiting: None    Nutrition  PEG: No  Diet: Soft    Integumentary  Dermatitis: Moderate    Objective:   Current weight: 82.6 kg  Last week's weight: 82.1 kg  Starting weight: 88 kg  BP: 146/86 (sitting), 149/74 (standing)  Pulse: 74 (sitting), 78 (standing)     General: 59 year old gentleman seated in an examination chair in [facility]  HEENT: NC/AT. EOMI. No rhinorrhea or epistaxis. Dry mucous membranes with thickened oral cavity secretions. Extensive mucositis involving the left buccal surfaces, left lower gingiva and left lateral oral tongue. No evidence of thrush.  Pulmonary: No wheezing, stridor or respiratory distress  Skin: Brisk erythema involving the left lateral face and neck. Patchy desquamation confined to the skin folds except for 3 cm patch in left supraclavicular area.     Treatment-related toxicities (CTCAE v5.0):  Dermatitis: Grade 2  Mucositis: Grade 2    Assessment:    Mr. 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 He is receiving adjuvant chemoradiotherapy for improved locoregional disease control. He is developing the anticipated progressive radiation-induced dermatitis and mucositis..    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:    Complete chemoradiotherapy next Monday (7/20/2020)    Follow-up with NP in radiation oncology clinic in 1-2 weeks     Follow-up with MD in radiation oncology clinic in 6 weeks    Pain management:    Continue acetaminophen and magic mouthwash as needed for mild to moderate pain    Patient has declined oxycodone 5 mg q6h as needed for moderate to severe pain    Fluids/Electrolytes/Nutrition:    Continue diet as tolerated    Continue ongoing follow-up with  oncology dietician for evaluation and cares throughout treatment    Dermatitis:    Continue BID/TID moisturizer use to the skin of the left face and neck    Mucositis:    Pain management as described above    Continue frequent salt/soda rinses     Dry eyes:    Continue frequent saline drops to the left eye    Continue Refresh PM drops to the left eye at bedtime     Mosaiq chart and setup information reviewed  IGRT images reviewed    Medication list reviewed    Jose Monroe MD  PGY-4 Radiation Oncology  Clinic: [phone]  Pager: [phone]    I saw the patient with the resident.  I agree with the resident's note and plan of care.      EMMA Santos M.D.  Department of Radiation Oncology  [facility]

## 2020-07-16 NOTE — LETTER
2020         RE: Norris Reyes  [address]  [place] [address]        Dear Colleague,    Thank you for referring your patient, Norris Reyes, to the RADIATION ONCOLOGY CLINIC. Please see a copy of my visit note below.    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 Current dose of 400 cGy. Tolerating treatment well.    2020: Cycle 1, day 1 of high-dose cisplatin chemotherapy    2020: Weekly RT visit. Current dose of 1400 cGy. Presents with new submental nodularity.    2020: Weekly RT visit. Current dose of 2400 cGy. Moderate to severe oral tongue pain.    2020: Weekly RT visit. Current dose of 3400 cGy. Improved oral cavity pain.    2020: Cycle 1, day 22 of high-dose cisplatin chemotherapy    2020: Weekly RT visit. Current dose of 4400 cGy. Stable oral cavity pain. Stable weight.    2020: Weekly RT visit. Current dose of 5400 cGy. Stable oral cavity pain. Weight down approximately 3 kg.    2020: Weekly RT visit. Current dose of 6200 cGy. Mild oral cavity pain.     ED visits/Hospitalizations:  None    Missed Treatments:  7/15/2020: Due to machine downtime    Subjective: Mr. Reyes presents to clinic today for his weekly on-treatment visit. He describes persistent oral cavity pain which he rates today as a 2/10 in severity. He continues to use PRN acetaminophen and Magic mouthwash without use of narcotic pain medications. He continues to eat a soft diet without  difficulty and his weight is up from last week. He has noticed some skin breakdown from his seatbelt rubbing on his neck. He is otherwise compliant with his skin and oral cares and his remaining ROS are unchanged from last week.    ROS:   Constitutional  Pain (0-10): 2 (mouth), 2 (throat), 1 (skin)  Fatigue: Mild     CNS  Headaches: None    ENT  Mucositis: Moderate    Cardiopulmonary  Dyspnea: None    GI  Nausea/vomiting: None    Nutrition  PEG: No  Diet: Soft    Integumentary  Dermatitis: Moderate    Objective:   Current weight: 82.6 kg  Last week's weight: 82.1 kg  Starting weight: 88 kg  BP: 146/86 (sitting), 149/74 (standing)  Pulse: 74 (sitting), 78 (standing)     General: 59 year old gentleman seated in an examination chair in [facility]  HEENT: NC/AT. EOMI. No rhinorrhea or epistaxis. Dry mucous membranes with thickened oral cavity secretions. Extensive mucositis involving the left buccal surfaces, left lower gingiva and left lateral oral tongue. No evidence of thrush.  Pulmonary: No wheezing, stridor or respiratory distress  Skin: Brisk erythema involving the left lateral face and neck. Patchy desquamation confined to the skin folds except for 3 cm patch in left supraclavicular area.     Treatment-related toxicities (CTCAE v5.0):  Dermatitis: Grade 2  Mucositis: Grade 2    Assessment:    Mr. 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 He is receiving adjuvant chemoradiotherapy for improved locoregional disease control. He is developing the anticipated progressive radiation-induced dermatitis and mucositis..    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:    Complete chemoradiotherapy next Monday (7/20/2020)    Follow-up with NP in radiation oncology clinic in 1-2 weeks     Follow-up with MD in radiation oncology clinic in 6 weeks    Pain management:    Continue acetaminophen and  magic mouthwash as needed for mild to moderate pain    Patient has declined oxycodone 5 mg q6h as needed for moderate to severe pain    Fluids/Electrolytes/Nutrition:    Continue diet as tolerated    Continue ongoing follow-up with oncology dietician for evaluation and cares throughout treatment    Dermatitis:    Continue BID/TID moisturizer use to the skin of the left face and neck    Mucositis:    Pain management as described above    Continue frequent salt/soda rinses     Dry eyes:    Continue frequent saline drops to the left eye    Continue Refresh PM drops to the left eye at bedtime     Mosaiq chart and setup information reviewed  IGRT images reviewed    Medication list reviewed    Jose Monroe MD  PGY-4 Radiation Oncology  Clinic: [phone]  Pager: [phone]    I saw the patient with the resident.  I agree with the resident's note and plan of care.      EMMA Santos M.D.  Department of Radiation Oncology  [facility]    Again, thank you for allowing me to participate in the care of your patient.        Sincerely,        Dax Santos MD

## 2020-07-17 ENCOUNTER — APPOINTMENT (OUTPATIENT)
Dept: RADIATION ONCOLOGY | Facility: CLINIC | Age: 60
End: 2020-07-17
Attending: RADIOLOGY
Payer: COMMERCIAL

## 2020-07-17 PROCEDURE — 77386 ZZH IMRT TREATMENT DELIVERY, COMPLEX: CPT | Performed by: RADIOLOGY

## 2020-07-20 ENCOUNTER — ONCOLOGY VISIT (OUTPATIENT)
Dept: RADIATION ONCOLOGY | Facility: CLINIC | Age: 60
End: 2020-07-20

## 2020-07-20 ENCOUNTER — TELEPHONE (OUTPATIENT)
Dept: OTOLARYNGOLOGY | Facility: CLINIC | Age: 60
End: 2020-07-20

## 2020-07-20 NOTE — TELEPHONE ENCOUNTER
Called patient back and left an additional VM.    Informed him that 9/2 appointment will be with DR CAAL at 1pm, not Dr Castañeda as originally stated. Apologized for error on my end! Will send letter with appointment itinerary.

## 2020-07-20 NOTE — TELEPHONE ENCOUNTER
Called patient and LVM.    Informed him that he is scheduled for a 6 wk follow up with Dr Castañeda on 9/2, following his appointment with Dr. Kurtz.    Provided ENT call center number for pt to call back if appointment date/time will not work. Patient is welcome to reschedule as IN CLINIC appointment in any available P RETURN slot of Dr. Castañeda's if needed.

## 2020-07-23 NOTE — PROGRESS NOTES
"Norris Reyes is a 59 year old male who is being evaluated via a billable video visit.      The patient has been notified of following:     \"This video visit will be conducted via a call between you and your physician/provider. We have found that certain health care needs can be provided without the need for an in-person physical exam.  This service lets us provide the care you need with a video conversation.  If a prescription is necessary we can send it directly to your pharmacy.  If lab work is needed we can place an order for that and you can then stop by our lab to have the test done at a later time.    Video visits are billed at different rates depending on your insurance coverage.  Please reach out to your insurance provider with any questions.    If during the course of the call the physician/provider feels a video visit is not appropriate, you will not be charged for this service.\"    Patient has given verbal consent for Video visit? Yes    How would you like to obtain your AVS? MyChart     If you are dropped from the video visit, the video invite should be resent to: Text to cell phone: [phone]     Will anyone else be joining your video visit? No      Silver Sulfadiazine 1% external cream refill.    Alec Moscoso LPN    Video-Visit Details    Type of service:  Video Visit    Video Start Time:8:47a  Video End Time: 8:58a  Plus 6 minute telephone call due to video freezing     Originating Location (pt. Location): Home    Distant Location (provider location):  [facility]     Platform used for Video Visit: ASTER Chen    Oncology/Hematology Visit Note  Jul 24, 2020    Reason for Visit: Follow up of SCC L buccal mucosa    History of Present Illness: Norris Reyes is a 59 year old male with PMH of leukoplakia, HTN, HLD. 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  He underwent resection with L neck dissection and " R radial freeflap graft on 4/23/20.  His course was complicated by wound bleeding in his mouth. He had complications of wound dehiscence in the L buccal mucosa after oral feeding. He is was NPO and receiving tube feeds.  He met with Dr. Kurtz and Dr. Leonard and initiated concurrent chemoradiation. He started radiation on 6/3 and started HD cisplatin 6/8/20. Of note his NG tube was removed and he is trying to avoid port/PEG placement.      He completed radiation 7/17/20. Due to scheduling he only received 2 doses of HD cisplatin.     He was called today for oncology follow-up after treatment.     Interval History:  Mr. Reyes was called via [facility] for oncology follow-up and his wife was present on the call. He is overall doing OK. He still has swelling and pain in his left face/mouth and is doing Tylenol 1g TID for this with good pain control. He still has mouth sores but notes mild improvement, doing Healios and salt/soda swishes daily. He is swallowing OK and feels like he is eating a little better, doing protein shakes every other day. He is drinking almost 60oz of green tea daily because he doesn't like water. His neck skin is much improved though still peeling, doing silvadene and aquaphor with gauze.    He did have a couple days of fevers only at night 100-101 with no other symptoms other than cold feet and now he hasn't had a fever in 3 days. He did not take anything for the fever, though notes he does take the scheduled Tylenol.    He denies headaches, dizziness. His tinnitus is still present but improving. No chest pain, SOB, cough, nausea, vomiting, abdominal pain, bladder concerns, edema, bleeding issues, rashes, neuropathy. He has slight constipation but drinks senna tea for this.     Current Outpatient Medications   Medication Sig Dispense Refill     diphenhydrAMINE HCl (BENADRYL ALLERGY PO)        fenofibrate (TRIGLIDE/LOFIBRA) 160 MG tablet Take 160 mg by mouth daily       LORazepam (ATIVAN) 0.5 MG  tablet Take 1 tablet (0.5 mg) by mouth every 4 hours as needed (Anxiety, Nausea/Vomiting or Sleep) 30 tablet 2     magic mouthwash suspension (diphenhydrAMINE, lidocaine, aluminum-magnesium & simethicone) Swish and swallow 10 mLs in mouth every 6 hours as needed for mouth sores 360 mL 3     mineral oil-hydrophilic petrolatum (AQUAPHOR) external ointment Apply topically every 8 hours To neck incisions 396 g 1     multivitamins w/minerals (CERTAVITE) liquid 15 mLs by Per Feeding Tube route daily Take when on tube feeds 236 mL 1     prochlorperazine (COMPAZINE) 10 MG tablet Take 1 tablet (10 mg) by mouth every 6 hours as needed (Nausea/Vomiting) 30 tablet 2     silver sulfADIAZINE (SILVADENE) 1 % external cream Apply topically daily 25 g 1     White Petrolatum-Mineral Oil (REFRESH P.M.) OINT Apply 1-2 drops to left eye at night 1 Tube 1     Physical Examination:  No vitals for this visit.   Wt Readings from Last 10 Encounters:   07/16/20 82.6 kg (182 lb)   07/09/20 82.1 kg (181 lb)   07/02/20 85.3 kg (188 lb)   06/29/20 83.7 kg (184 lb 8 oz)   06/29/20 83.7 kg (184 lb 8 oz)   06/25/20 83.9 kg (185 lb)   06/18/20 84.2 kg (185 lb 9.6 oz)   06/11/20 88.5 kg (195 lb)   06/08/20 86.2 kg (190 lb)   05/20/20 88 kg (194 lb)     Video physical exam  General: Patient appears well in no acute distress. Normal body habitus. Fullness to left face.   Skin: No visualized rash or lesions on visualized skin  Eyes: EOMI, no erythema, sclera icterus or discharge noted  Resp: Appears to be breathing comfortably without accessory muscle usage, speaking in full sentences, no cough  MSK: Appears to have normal range of motion based on visualized movements  Neurologic: No apparent tremors, facial movements symmetric  Psych: Normal affect, alert and oriented    The rest of a comprehensive physical examination is deferred due to PHE (public health emergency) video restrictions    Laboratory Data:  No laboratory data today.    Assessment and  Plan:  1. Onc  SCC of the buccal mucosa, stage VIA ([id number]) s/p resection of the oral cavity of left neck dissection, R radical free flap reconstruction. Completed chemoradiation with HD cisplatin 6/3/20-7/17/20. Doing as expected after treatment.     Will see him in one week for ongoing follow-up and will get labs locally prior to the visit. Will need PET in October.    2. ENT  Mucositis: Improving, continue Healios and salt/soda swishes    Throat pain: Stable. Continue Tylenol 1g TID PRN. Not needing Oxycodone    Tinnitus: Improved, monitor and consider audiogram in future    Radiation dermatitis: Continue aquaphor and silvadene PRN.    3. GI/FEN  Nausea: Resolved    Constipation: Continue Senna tea PRN    Eating/drinking: States he is doing well, weight reported to be stable. Discussed continuing protein supplements and trying to get more noncaffinated drinks in per day. Recheck CMP next week.        4. ID  Nighttime fever x 2 days, now resolved. Discussed he needs to call or go to local urgent care/ED if he has recurrent fevers.     5. Cards  Holding BP meds for now.     Nicholas Anderson PA-C  [facility]  [address] [place] [address] [phone]

## 2020-07-24 ENCOUNTER — VIRTUAL VISIT (OUTPATIENT)
Dept: ONCOLOGY | Facility: CLINIC | Age: 60
End: 2020-07-24
Attending: INTERNAL MEDICINE
Payer: COMMERCIAL

## 2020-07-24 DIAGNOSIS — L30.9 DERMATITIS: ICD-10-CM

## 2020-07-24 DIAGNOSIS — C06.0 SQUAMOUS CELL CANCER OF BUCCAL MUCOSA (H): Primary | ICD-10-CM

## 2020-07-24 PROCEDURE — 40001009 ZZH VIDEO/TELEPHONE VISIT; NO CHARGE

## 2020-07-24 PROCEDURE — 99214 OFFICE O/P EST MOD 30 MIN: CPT | Mod: 95 | Performed by: PHYSICIAN ASSISTANT

## 2020-07-24 RX ORDER — SILVER SULFADIAZINE 10 MG/G
CREAM TOPICAL DAILY
Qty: 25 G | Refills: 1 | Status: SHIPPED | OUTPATIENT
Start: 2020-07-24 | End: 2020-07-31

## 2020-07-24 NOTE — LETTER
7/24/2020         RE: Norris Reyes  [address]  [place] [address]      Norris Reyes is a 59 year old male who is being evaluated via a billable video visit.      Silver Sulfadiazine 1% external cream refill.    Alec Moscoso LPN    Video-Visit Details    Type of service:  Video Visit    Video Start Time:8:47a  Video End Time: 8:58a  Plus 6 minute telephone call due to video freezing     Originating Location (pt. Location): Home    Distant Location (provider location):  [facility]     Platform used for Video Visit: HipscanASTER Schmidt    Oncology/Hematology Visit Note  Jul 24, 2020    Reason for Visit: Follow up of SCC L buccal mucosa    History of Present Illness: Norris Reyes is a 59 year old male with PMH of leukoplakia, HTN, HLD. 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  He underwent resection with L neck dissection and R radial freeflap graft on 4/23/20.  His course was complicated by wound bleeding in his mouth. He had complications of wound dehiscence in the L buccal mucosa after oral feeding. He is was NPO and receiving tube feeds.  He met with Dr. Kurtz and Dr. Leonard and initiated concurrent chemoradiation. He started radiation on 6/3 and started HD cisplatin 6/8/20. Of note his NG tube was removed and he is trying to avoid port/PEG placement.      He completed radiation 7/17/20. Due to scheduling he only received 2 doses of HD cisplatin.     He was called today for oncology follow-up after treatment.     Interval History:  Mr. Reyes was called via [employer] for oncology follow-up and his wife was present on the call. He is overall doing OK. He still has swelling and pain in his left face/mouth and is doing Tylenol 1g TID for this with good pain control. He still has mouth sores but notes mild improvement, doing Healios and salt/soda swishes daily. He is swallowing OK and feels like he is eating a  little better, doing protein shakes every other day. He is drinking almost 60oz of green tea daily because he doesn't like water. His neck skin is much improved though still peeling, doing silvadene and aquaphor with gauze.    He did have a couple days of fevers only at night 100-101 with no other symptoms other than cold feet and now he hasn't had a fever in 3 days. He did not take anything for the fever, though notes he does take the scheduled Tylenol.    He denies headaches, dizziness. His tinnitus is still present but improving. No chest pain, SOB, cough, nausea, vomiting, abdominal pain, bladder concerns, edema, bleeding issues, rashes, neuropathy. He has slight constipation but drinks senna tea for this.     Current Outpatient Medications   Medication Sig Dispense Refill     diphenhydrAMINE HCl (BENADRYL ALLERGY PO)        fenofibrate (TRIGLIDE/LOFIBRA) 160 MG tablet Take 160 mg by mouth daily       LORazepam (ATIVAN) 0.5 MG tablet Take 1 tablet (0.5 mg) by mouth every 4 hours as needed (Anxiety, Nausea/Vomiting or Sleep) 30 tablet 2     magic mouthwash suspension (diphenhydrAMINE, lidocaine, aluminum-magnesium & simethicone) Swish and swallow 10 mLs in mouth every 6 hours as needed for mouth sores 360 mL 3     mineral oil-hydrophilic petrolatum (AQUAPHOR) external ointment Apply topically every 8 hours To neck incisions 396 g 1     multivitamins w/minerals (CERTAVITE) liquid 15 mLs by Per Feeding Tube route daily Take when on tube feeds 236 mL 1     prochlorperazine (COMPAZINE) 10 MG tablet Take 1 tablet (10 mg) by mouth every 6 hours as needed (Nausea/Vomiting) 30 tablet 2     silver sulfADIAZINE (SILVADENE) 1 % external cream Apply topically daily 25 g 1     White Petrolatum-Mineral Oil (REFRESH P.M.) OINT Apply 1-2 drops to left eye at night 1 Tube 1     Physical Examination:  No vitals for this visit.   Wt Readings from Last 10 Encounters:   07/16/20 82.6 kg (182 lb)   07/09/20 82.1 kg (181 lb)   07/02/20  85.3 kg (188 lb)   06/29/20 83.7 kg (184 lb 8 oz)   06/29/20 83.7 kg (184 lb 8 oz)   06/25/20 83.9 kg (185 lb)   06/18/20 84.2 kg (185 lb 9.6 oz)   06/11/20 88.5 kg (195 lb)   06/08/20 86.2 kg (190 lb)   05/20/20 88 kg (194 lb)     Video physical exam  General: Patient appears well in no acute distress. Normal body habitus. Fullness to left face.   Skin: No visualized rash or lesions on visualized skin  Eyes: EOMI, no erythema, sclera icterus or discharge noted  Resp: Appears to be breathing comfortably without accessory muscle usage, speaking in full sentences, no cough  MSK: Appears to have normal range of motion based on visualized movements  Neurologic: No apparent tremors, facial movements symmetric  Psych: Normal affect, alert and oriented    The rest of a comprehensive physical examination is deferred due to PHE (public health emergency) video restrictions    Laboratory Data:  No laboratory data today.    Assessment and Plan:  1. Onc  SCC of the buccal mucosa, stage VIA ([id number]) s/p resection of the oral cavity of left neck dissection, R radical free flap reconstruction. Completed chemoradiation with HD cisplatin 6/3/20-7/17/20. Doing as expected after treatment.     Will see him in one week for ongoing follow-up and will get labs locally prior to the visit. Will need PET in October.    2. ENT  Mucositis: Improving, continue Healios and salt/soda swishes    Throat pain: Stable. Continue Tylenol 1g TID PRN. Not needing Oxycodone    Tinnitus: Improved, monitor and consider audiogram in future    Radiation dermatitis: Continue aquaphor and silvadene PRN.    3. GI/FEN  Nausea: Resolved    Constipation: Continue Senna tea PRN    Eating/drinking: States he is doing well, weight reported to be stable. Discussed continuing protein supplements and trying to get more noncaffinated drinks in per day. Recheck CMP next week.        4. ID  Nighttime fever x 2 days, now resolved. Discussed he needs to call or go to  local urgent care/ED if he has recurrent fevers.     5. Cards  Holding BP meds for now.     Nicholas Anderson PA-C  [facility]  [address] [place] [address] [phone]        ASTER Wang

## 2020-07-24 NOTE — Clinical Note
"    7/24/2020         RE: Norris Reyes  [address]  [facility] [address]        Dear Colleague,    Thank you for referring your patient, Norris Reyes, to the [facility]. Please see a copy of my visit note below.    Norris Reyes is a 59 year old male who is being evaluated via a billable video visit.      The patient has been notified of following:     \"This video visit will be conducted via a call between you and your physician/provider. We have found that certain health care needs can be provided without the need for an in-person physical exam.  This service lets us provide the care you need with a video conversation.  If a prescription is necessary we can send it directly to your pharmacy.  If lab work is needed we can place an order for that and you can then stop by our lab to have the test done at a later time.    Video visits are billed at different rates depending on your insurance coverage.  Please reach out to your insurance provider with any questions.    If during the course of the call the physician/provider feels a video visit is not appropriate, you will not be charged for this service.\"    Patient has given verbal consent for Video visit? Yes    How would you like to obtain your AVS? MyChart     If you are dropped from the video visit, the video invite should be resent to: Text to cell phone: [phone]     Will anyone else be joining your video visit? No  {If patient encounters technical issues they should call [phone] :[phone]}    Silver Sulfadiazine 1% external cream refill.    Alec Moscoso LPN    Video-Visit Details    Type of service:  Video Visit    Video Start Time:8:47a  Video End Time: 8:58a  Plus 6 minute telephone call due to video freezing     Originating Location (pt. Location): Home    Distant Location (provider location):  [facility]     Platform used for Video Visit: Adalid Anderson, " PA    Oncology/Hematology Visit Note  Jul 24, 2020    Reason for Visit: Follow up of SCC L buccal mucosa    History of Present Illness: Norris Reyes is a 59 year old male with PMH of leukoplakia, HTN, HLD. 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  He underwent resection with L neck dissection and R radial freeflap graft on 4/23/20.  His course was complicated by wound bleeding in his mouth. He had complications of wound dehiscence in the L buccal mucosa after oral feeding. He is was NPO and receiving tube feeds.  He met with Dr. Kurtz and Dr. Leonard and initiated concurrent chemoradiation. He started radiation on 6/3 and started HD cisplatin 6/8/20. Of note his NG tube was removed and he is trying to avoid port/PEG placement.      He completed radiation 7/17/20. Due to scheduling he only received 2 doses of HD cisplatin.     He was called today for oncology follow-up after treatment.     Interval History:  Mr. Reyes was called via [facility] for oncology follow-up and his wife was present on the call. He is overall doing OK. He still has swelling and pain in his left face/mouth and is doing Tylenol 1g TID for this with good pain control. He still has mouth sores but notes mild improvement, doing Healios and salt/soda swishes daily. He is swallowing OK and feels like he is eating a little better, doing protein shakes every other day. He is drinking almost 60oz of green tea daily because he doesn't like water. His neck skin is much improved though still peeing, doing silvadene and aquaphor with gauze.    He did have a couple days of fevers only at night 100-101 with no other symptoms other than cold feet and now he hasn't had a fever in 3 days. He did not take anything for the fever, though notes he does take the scheduled Tylenol.    He denies headaches, dizziness. His tinnitus is still present but improving. No chest pain, SOB, cough, nausea, vomiting, abdominal  pain, bladder concerns, edema, bleeding issues, rashes, neuropathy. He has slight constipation but drinks senna tea for this.     Current Outpatient Medications   Medication Sig Dispense Refill     diphenhydrAMINE HCl (BENADRYL ALLERGY PO)        fenofibrate (TRIGLIDE/LOFIBRA) 160 MG tablet Take 160 mg by mouth daily       LORazepam (ATIVAN) 0.5 MG tablet Take 1 tablet (0.5 mg) by mouth every 4 hours as needed (Anxiety, Nausea/Vomiting or Sleep) 30 tablet 2     magic mouthwash suspension (diphenhydrAMINE, lidocaine, aluminum-magnesium & simethicone) Swish and swallow 10 mLs in mouth every 6 hours as needed for mouth sores 360 mL 3     mineral oil-hydrophilic petrolatum (AQUAPHOR) external ointment Apply topically every 8 hours To neck incisions 396 g 1     multivitamins w/minerals (CERTAVITE) liquid 15 mLs by Per Feeding Tube route daily Take when on tube feeds 236 mL 1     prochlorperazine (COMPAZINE) 10 MG tablet Take 1 tablet (10 mg) by mouth every 6 hours as needed (Nausea/Vomiting) 30 tablet 2     silver sulfADIAZINE (SILVADENE) 1 % external cream Apply topically daily 25 g 1     White Petrolatum-Mineral Oil (REFRESH P.M.) OINT Apply 1-2 drops to left eye at night 1 Tube 1     Physical Examination:  There were no vitals taken for this visit.  Wt Readings from Last 10 Encounters:   07/16/20 82.6 kg (182 lb)   07/09/20 82.1 kg (181 lb)   07/02/20 85.3 kg (188 lb)   06/29/20 83.7 kg (184 lb 8 oz)   06/29/20 83.7 kg (184 lb 8 oz)   06/25/20 83.9 kg (185 lb)   06/18/20 84.2 kg (185 lb 9.6 oz)   06/11/20 88.5 kg (195 lb)   06/08/20 86.2 kg (190 lb)   05/20/20 88 kg (194 lb)     Video physical exam  General: Patient appears well in no acute distress. Normal body habitus. Fullness to left face.   Skin: No visualized rash or lesions on visualized skin  Eyes: EOMI, no erythema, sclera icterus or discharge noted  Resp: Appears to be breathing comfortably without accessory muscle usage, speaking in full sentences, no  cough  MSK: Appears to have normal range of motion based on visualized movements  Neurologic: No apparent tremors, facial movements symmetric  Psych: Normal affect, alert and oriented    The rest of a comprehensive physical examination is deferred due to PHE (public health emergency) video restrictions    Laboratory Data:  No laboratory data today.    Assessment and Plan:  IN PROGRESS        Nicholas Anderson PA-C  [facility] [address] [id number] [phone]      Again, thank you for allowing me to participate in the care of your patient.        Sincerely,        ASTER Wang

## 2020-07-28 ENCOUNTER — DOCUMENTATION ONLY (OUTPATIENT)
Dept: ONCOLOGY | Facility: CLINIC | Age: 60
End: 2020-07-28

## 2020-07-28 NOTE — PROGRESS NOTES
Lab orders printed and faxed to [facility], fax # [phone] per request of clinic coordinator.  Successful fax transmission was verified via rightfax.        Dee Garcia CMA

## 2020-07-29 ENCOUNTER — TRANSFERRED RECORDS (OUTPATIENT)
Dept: HEALTH INFORMATION MANAGEMENT | Facility: CLINIC | Age: 60
End: 2020-07-29

## 2020-07-29 LAB
ALBUMIN SERPL-MCNC: 3.8 G/DL (ref 3.5–5)
ALP SERPL-CCNC: 60 U/L (ref 40–150)
ALT SERPL-CCNC: 13 U/L (ref 6–40)
ANION GAP SERPL CALCULATED.3IONS-SCNC: 7 MMOL/L (ref 3–15)
AST SERPL-CCNC: 12 U/L (ref 10–40)
BASOPHILS # BLD AUTO: 0 10*3/UL (ref 0–0.1)
BASOPHILS NFR BLD AUTO: 0 %
BILIRUB SERPL-MCNC: 0.3 MG/DL (ref 0.2–1.2)
BUN SERPL-MCNC: 19 MG/DL (ref 5–24)
CALCIUM SERPL-MCNC: 9.3 MG/DL (ref 8.4–10.5)
CHLORIDE SERPLBLD-SCNC: 103 MMOL/L (ref 99–110)
CO2 SERPL-SCNC: 30 MMOL/L (ref 19–29)
CREAT SERPL-MCNC: 0.93 MG/DL (ref 0.7–1.2)
EOSINOPHIL # BLD AUTO: 0 10*3/UL (ref 0–0.4)
EOSINOPHIL NFR BLD AUTO: 1.3 %
ERYTHROCYTE [DISTWIDTH] IN BLOOD BY AUTOMATED COUNT: 13.3 % (ref 11.3–14.6)
GFR SERPL CREATININE-BSD FRML MDRD: >60 ML/MIN/1.73M2
GLUCOSE SERPL-MCNC: 105 MG/DL (ref 70–99)
HCT VFR BLD AUTO: 32.6 % (ref 38.4–49.7)
HEMOGLOBIN: 11 G/DL (ref 12.9–16.9)
IMMATURE GRANS (ABS): 0.02 X10E3/UL (ref 0–0.06)
IMMATURE GRANULOCYTES: 0.9 %
LYMPHOCYTES # BLD AUTO: 0.4 10*3/UL (ref 0.8–3.3)
LYMPHOCYTES NFR BLD AUTO: 18.8 %
MAGNESIUM SERPL-MCNC: 2.1 MG/DL (ref 1.8–2.7)
MCH RBC QN AUTO: 29.3 PG (ref 26.7–33.1)
MCHC RBC AUTO-ENTMCNC: 33.7 G/DL (ref 31.6–35.5)
MCV RBC AUTO: 86.7 FL (ref 81.4–99)
MONOCYTES # BLD AUTO: 0.4 10*3/UL (ref 0.2–0.9)
MONOCYTES NFR BLD AUTO: 16.1 %
NEUTROPHILS # BLD AUTO: 1.4 10*3/UL (ref 1.5–7.6)
NEUTROPHILS NFR BLD AUTO: 62.9 %
PLATELET # BLD AUTO: 190 10^9/L (ref 130–375)
POTASSIUM SERPL-SCNC: 3.9 MMOL/L (ref 3.4–5.1)
PROT SERPL-MCNC: 6.7 G/DL (ref 6–8)
RBC # BLD AUTO: 3.76 10^12/L (ref 4.14–5.76)
SODIUM SERPL-SCNC: 140 MMOL/L (ref 134–143)
WBC # BLD AUTO: 2.2 10^9/L (ref 3.2–11)

## 2020-07-30 LAB — TSH SERPL-ACNC: 0.37 MCU/ML (ref 0.4–3.99)

## 2020-07-30 NOTE — PROGRESS NOTES
"Norris Reyes is a 59 year old male who is being evaluated via a billable video visit.      The patient has been notified of following:     \"This video visit will be conducted via a call between you and your physician/provider. We have found that certain health care needs can be provided without the need for an in-person physical exam.  This service lets us provide the care you need with a video conversation.  If a prescription is necessary we can send it directly to your pharmacy.  If lab work is needed we can place an order for that and you can then stop by our lab to have the test done at a later time.    Video visits are billed at different rates depending on your insurance coverage.  Please reach out to your insurance provider with any questions.    If during the course of the call the physician/provider feels a video visit is not appropriate, you will not be charged for this service.\"    Patient has given verbal consent for Video visit? Yes  How would you like to obtain your AVS? MyChart  If you are dropped from the video visit, the video invite should be resent to: Text to cell phone: [phone]   Will anyone else be joining your video visit? No       Vitals - Patient Reported  Weight (Patient Reported): 82.1 kg (181 lb)  Height (Patient Reported): 182.9 cm (6' 0.01\")  BMI (Based on Pt Reported Ht/Wt): 24.54  Pain Score: No Pain (0)    I have reviewed and updated the patient's allergies and medication list.    Concerns: No concerns  Refills: No refills needed.      Abby Fernandes CMA    Video-Visit Details    Type of service:  Video Visit    Video Start Time: 8:44am  Video End Time: 8:57am    Originating Location (pt. Location): Home    Distant Location (provider location):  [facility]     Platform used for Video Visit: ASTER Chen    Oncology/Hematology Visit Note  Jul 31, 2020    Reason for Visit: Follow up of SCC L buccal mucosa     History of Present " Illness: Norris Reyes is a 59 year old male with PMH of leukoplakia, HTN, HLD. 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  He underwent resection with L neck dissection and R radial freeflap graft on 4/23/20.  His course was complicated by wound bleeding in his mouth. He had complications of wound dehiscence in the L buccal mucosa after oral feeding. He is was NPO and receiving tube feeds.  He met with Dr. Kurtz and Dr. Leonard and initiated concurrent chemoradiation. He started radiation on 6/3 and started HD cisplatin 6/8/20. Of note his NG tube was removed and he is trying to avoid port/PEG placement.      He completed radiation 7/17/20. Due to scheduling he only received 2 doses of HD cisplatin.      He was called today for oncology follow-up after treatment.     Interval History:  Mr. Reyes was called today via video for his follow-up. He is doing well. He still has mild soreness in his mouth but it is improving, doing both Healios and salt/soda swishes. He is not needing any pain meds including no Tylenol this last week. He still has mild swelling in his left face that is improving. His skin has notably improved and he no longer needs any creams for this. He still has tinnitus that comes and goes and maybe slight hearing loss but he doesn't want to do a hearing test. He has not had any fevers.    He is eating well, did eggs and potatoes this morning. He is drinking well, noting his urine is light in color and he only had one episode of dizziness with standing. He denies headaches, chest pain, SOB, cough, nausea, vomiting, abdominal pain, bowel or bladder concerns, edema, bleeding issues, rashes, neuropathy.     Current Outpatient Medications   Medication Sig Dispense Refill     diphenhydrAMINE HCl (BENADRYL ALLERGY PO)        fenofibrate (TRIGLIDE/LOFIBRA) 160 MG tablet Take 160 mg by mouth daily       mineral oil-hydrophilic petrolatum (AQUAPHOR) external  ointment Apply topically every 8 hours To neck incisions 396 g 1     multivitamins w/minerals (CERTAVITE) liquid 15 mLs by Per Feeding Tube route daily Take when on tube feeds 236 mL 1     silver sulfADIAZINE (SILVADENE) 1 % external cream Apply topically daily 25 g 1       Physical Examination:  BP taken during visit at home 142/85, reported weight 181lb  Wt Readings from Last 10 Encounters:   07/16/20 82.6 kg (182 lb)   07/09/20 82.1 kg (181 lb)   07/02/20 85.3 kg (188 lb)   06/29/20 83.7 kg (184 lb 8 oz)   06/29/20 83.7 kg (184 lb 8 oz)   06/25/20 83.9 kg (185 lb)   06/18/20 84.2 kg (185 lb 9.6 oz)   06/11/20 88.5 kg (195 lb)   06/08/20 86.2 kg (190 lb)   05/20/20 88 kg (194 lb)     Video physical exam  General: Patient appears well in no acute distress. Normal body habitus. Fullness to left face.   Skin: No visualized rash or lesions on visualized skin  Eyes: EOMI, no erythema, sclera icterus or discharge noted  Resp: Appears to be breathing comfortably without accessory muscle usage, speaking in full sentences, no cough  MSK: Appears to have normal range of motion based on visualized movements  Neurologic: No apparent tremors, facial movements symmetric  Psych: Normal affect, alert and oriented     The rest of a comprehensive physical examination is deferred due to PHE (public health emergency) video restrictions    Laboratory Data:  Results for RAFALASMONSE (MRN [id number]) as of 7/31/2020 09:22   7/29/2020 00:00   Sodium 140   Potassium 3.9   Chloride 103   Carbon Dioxide 30 (A)   Urea Nitrogen 19   Creatinine 0.93   GFR Estimate >60   GFR Estimate If Black >60   Calcium 9.3   Anion Gap 7   Magnesium 2.1   Albumin 3.8   Protein Total 6.7   Bilirubin Total 0.3   Alkaline Phosphatase 60   ALT 13   AST 12   TSH 0.37 (L)   Glucose 105 (A)   WBC 2.2 (A)   Hemoglobin 11.0 (A)   Hematocrit 32.6 (A)   Platelet Count 190   RBC Count 3.76 (A)   MCV 86.7   MCH 29.3   MCHC 33.7   RDW 13.3   % Neutrophils 62.9   %  Lymphocytes 18.8   % Monocytes 16.1   % Eosinophils 1.3   % Basophils 0.0   Immature Granulocytes 0.9   Absolute Neutrophil 1.4 (A)   Absolute Lymphocytes 0.4 (A)   Absolute Monocytes 0.4   Absolute Eosinophils 0.0   Absolute Basophils 0.0   Immature Grans (Abs) 0.02       Assessment and Plan:  1. Onc  SCC of the buccal mucosa, stage VIA ([id number]) s/p resection of the oral cavity of left neck dissection, R radical free flap reconstruction. Completed chemoradiation with HD cisplatin 6/3/20-7/17/20. Doing quite well after treatment with minimal complaints and good signs of recovery. Does have mild cytopenias still that will improve with time away from treatment.     Will see him back in 4 weeks with local labs and request his Leonard follow-up with PET in October (needs to be after 10/25 as patient will be on vacation).      2. ENT  Mucositis: Improving, continue Healios and salt/soda swishes     Throat pain: Improved. HasTylenol PRN.      Tinnitus: Improved, monitor and consider audiogram in future-he declines at this time.     Radiation dermatitis: Resolved.      3. GI/FEN  Nausea: Resolved     Constipation: Continue Senna tea PRN     Eating/drinking: States he is doing well, weight reported to be stable. Discussed continuing protein supplements and adding in more variety of foods. Recheck CMP this week with no concerning findings.      4. ID  No recurrent fevers.     5. Cards  Holding BP meds for now. Slightly higher today but will monitor and restart if continues to run high.    6. Endo  TSH low, will recheck in 4 weeks. Asymptomatic.     Nicholas Anderson PA-C  [facility]  [address] [place] [address]  [phone]

## 2020-07-30 NOTE — PROCEDURES
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     Treatment Summary:  Treatment Site Dose  Modality From  To  Elapsed Days  Fx.  Tumor bed   6,000 cGy 06 X  6/03/2020  2020  41   30  Operative bed   5,700 cGy 06 X   6/03/2020  2020  41   30  Elective lymphatics 5,400 cGy 06 X   6/03/2020  2020  41   30  L neck boost     600 cGy 06 X   7/16/2020  2020   2     3  Cumulative     6,600 cGy 06 X   6/03/2020  2020  45   33         Dose per Fraction:    Tumor bed & boost:   200 cGy  Operative bed:   190 cGy  Elective lymphatics:   180 cGy        COMMENTS:                      Mr. Manzanares is a 59 year old male who was diagnosed with a locally advanced squamous cell carcinoma of the left buccal mucosa after presenting to his dentist in early . He underwent oral cavity resection with left neck dissection and free flap reconstruction on 2020. Surgical pathology revealed a 5.1 cm moderate to poorly differentiated squamous cell carcinoma with focal sarcomatoid transformation. The depth of invasion was 1.2 cm. There was no lymphovascular space invasion nor perineural invasion. The soft tissue margins were negative (closest: 1 mm to the deep margin). The periosteal deep margin was positive for disease however this was reviewed in multidisciplinary tumor board and felt to be potentially artifactual due to specimen acquisition and processing.  Metastatic disease was found within 3/5 left level IB (largest: 5.7 cm, positive extra-leelee extension), 1/8 left IIA (1.7 cm, -GLADYS) and 1/10 left level III (0.6 cm, -GLADYS) nodes with no evidence of disease within the sampled level IIB (0/5) or left IV (0/13) nodes).      Mr. Reyes received adjuvant chemoradiotherapy as described above. The tumor bed was treated to a dose of 60 Gy in 30 once-daily fractions using 6 MV photons delivered via a coplanar 2-arc volumetric modulated arc therapy technique. The remainder of the surgical bed received a dose of 57 Gy while the elective lymphatics, consisting of the undissected low left levels IV and V received 54 Gy, respectively, using a simultaneous integrated boost technique. Left level IB received a 6 Gy / 3 fraction sequential boost due to the presence of extranodal extension for a cumulative dose of 66 Gy. Radiation was delivered with concurrent high-dose cisplatin chemotherapy. Due to scheduling issues related to the ongoing SARS-Cov2 pandemic, his first chemotherapy infusion was delayed until his 4th fraction of radiotherapy. He received a total of 2 cycles of high-  dose cisplatin.     Mr. Reyes tolerated chemoradiotherapy reasonably well with the development of grade 2 dermatitis and mucositis by the completion of therapy. His oral cavity pain was managed with effectively managed with PRN acetaminophen and magic mouthwash. He was offered use of low-dose oxycodone for breakthrough symptoms but he declined use of any narcotic pain medications. His dermatitis was managed with frequent emollient application to the left face and neck.     Shortly following initiation of treatment, he developed more pronounced fluctuance within the submental region and was evaluated with ultrasound. Imaging revealed changes consistent with a post-operative hematoma. He underwent treatment replanning with hi fields slightly adjusted to account for the anatomic changes in this  location. He did have a 1-day break in therapy between fractions 30-31 secondary to machine downtime with   his missed fraction made up over the following weekend. He otherwise had no additional unplanned breaks in therapy.     ED visits/hospitalizations:   None     Missed treatments:   7/15/2020: 1-day treatment break between fractions 30-31 secondary to machine downtime     Acute Toxicity Profile by CTCAE v5.0:  Dermatitis: Grade 2  Mucositis: Grade 2     PAIN MANAGEMENT:   Continue PRN acetaminophen and magic mouthwash as needed for mild to moderate pain     FOLLOW UP PLAN:                         Follow-up with radiation oncology NP in 2 weeks  Follow-up in clinic with Dr. Kurtz on 9/2/2020 in coordination with Dr. Dior follow-up appointment      Resident Physician: Jose Monroe M.D.   Staff Physician: Dwayne Kurtz M.D.  Physicist: Andrew Boone, Ph.D.     CC:   MD Myles Chacon DO Michelle Porter, [place]                                  Radiation Oncology:  [facility] [address] [place] [address]

## 2020-07-31 ENCOUNTER — VIRTUAL VISIT (OUTPATIENT)
Dept: ONCOLOGY | Facility: CLINIC | Age: 60
End: 2020-07-31
Attending: INTERNAL MEDICINE
Payer: COMMERCIAL

## 2020-07-31 DIAGNOSIS — C06.0 SQUAMOUS CELL CANCER OF BUCCAL MUCOSA (H): Primary | ICD-10-CM

## 2020-07-31 PROCEDURE — 99214 OFFICE O/P EST MOD 30 MIN: CPT | Mod: 95 | Performed by: PHYSICIAN ASSISTANT

## 2020-07-31 PROCEDURE — 40001009 ZZH VIDEO/TELEPHONE VISIT; NO CHARGE

## 2020-07-31 NOTE — LETTER
"    7/31/2020         RE: Norris Reyes  [address]  [place] [address]        Dear Colleague,    Thank you for referring your patient, Norris Reyes, to the [facility]. Please see a copy of my visit note below.    Norris Reyes is a 59 year old male who is being evaluated via a billable video visit.          Vitals - Patient Reported  Weight (Patient Reported): 82.1 kg (181 lb)  Height (Patient Reported): 182.9 cm (6' 0.01\")  BMI (Based on Pt Reported Ht/Wt): 24.54  Pain Score: No Pain (0)    I have reviewed and updated the patient's allergies and medication list.    Concerns: No concerns  Refills: No refills needed.      Abby Fernandes CMA    Video-Visit Details    Type of service:  Video Visit    Video Start Time: 8:44am  Video End Time: 8:57am    Originating Location (pt. Location): Home    Distant Location (provider location):  [facility]     Platform used for Video Visit: ASTER Chen    Oncology/Hematology Visit Note  Jul 31, 2020    Reason for Visit: Follow up of SCC L buccal mucosa     History of Present Illness: Norris Reyes is a 59 year old male with PMH of leukoplakia, HTN, HLD. 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  He underwent resection with L neck dissection and R radial freeflap graft on 4/23/20.  His course was complicated by wound bleeding in his mouth. He had complications of wound dehiscence in the L buccal mucosa after oral feeding. He is was NPO and receiving tube feeds.  He met with Dr. Kurtz and Dr. Leonard and initiated concurrent chemoradiation. He started radiation on 6/3 and started HD cisplatin 6/8/20. Of note his NG tube was removed and he is trying to avoid port/PEG placement.      He completed radiation 7/17/20. Due to scheduling he only received 2 doses of HD cisplatin.      He was called today for oncology follow-up after treatment.     Interval " History:  Mr. Reyes was called today via video for his follow-up. He is doing well. He still has mild soreness in his mouth but it is improving, doing both Healios and salt/soda swishes. He is not needing any pain meds including no Tylenol this last week. He still has mild swelling in his left face that is improving. His skin has notably improved and he no longer needs any creams for this. He still has tinnitus that comes and goes and maybe slight hearing loss but he doesn't want to do a hearing test. He has not had any fevers.    He is eating well, did eggs and potatoes this morning. He is drinking well, noting his urine is light in color and he only had one episode of dizziness with standing. He denies headaches, chest pain, SOB, cough, nausea, vomiting, abdominal pain, bowel or bladder concerns, edema, bleeding issues, rashes, neuropathy.     Current Outpatient Medications   Medication Sig Dispense Refill     diphenhydrAMINE HCl (BENADRYL ALLERGY PO)        fenofibrate (TRIGLIDE/LOFIBRA) 160 MG tablet Take 160 mg by mouth daily       mineral oil-hydrophilic petrolatum (AQUAPHOR) external ointment Apply topically every 8 hours To neck incisions 396 g 1     multivitamins w/minerals (CERTAVITE) liquid 15 mLs by Per Feeding Tube route daily Take when on tube feeds 236 mL 1     silver sulfADIAZINE (SILVADENE) 1 % external cream Apply topically daily 25 g 1       Physical Examination:  BP taken during visit at home 142/85, reported weight 181lb  Wt Readings from Last 10 Encounters:   07/16/20 82.6 kg (182 lb)   07/09/20 82.1 kg (181 lb)   07/02/20 85.3 kg (188 lb)   06/29/20 83.7 kg (184 lb 8 oz)   06/29/20 83.7 kg (184 lb 8 oz)   06/25/20 83.9 kg (185 lb)   06/18/20 84.2 kg (185 lb 9.6 oz)   06/11/20 88.5 kg (195 lb)   06/08/20 86.2 kg (190 lb)   05/20/20 88 kg (194 lb)     Video physical exam  General: Patient appears well in no acute distress. Normal body habitus. Fullness to left face.   Skin: No visualized  rash or lesions on visualized skin  Eyes: EOMI, no erythema, sclera icterus or discharge noted  Resp: Appears to be breathing comfortably without accessory muscle usage, speaking in full sentences, no cough  MSK: Appears to have normal range of motion based on visualized movements  Neurologic: No apparent tremors, facial movements symmetric  Psych: Normal affect, alert and oriented     The rest of a comprehensive physical examination is deferred due to PHE (public health emergency) video restrictions    Laboratory Data:  Results for MONSE MANZANARES (MRN [id number]) as of 7/31/2020 09:22   7/29/2020 00:00   Sodium 140   Potassium 3.9   Chloride 103   Carbon Dioxide 30 (A)   Urea Nitrogen 19   Creatinine 0.93   GFR Estimate >60   GFR Estimate If Black >60   Calcium 9.3   Anion Gap 7   Magnesium 2.1   Albumin 3.8   Protein Total 6.7   Bilirubin Total 0.3   Alkaline Phosphatase 60   ALT 13   AST 12   TSH 0.37 (L)   Glucose 105 (A)   WBC 2.2 (A)   Hemoglobin 11.0 (A)   Hematocrit 32.6 (A)   Platelet Count 190   RBC Count 3.76 (A)   MCV 86.7   MCH 29.3   MCHC 33.7   RDW 13.3   % Neutrophils 62.9   % Lymphocytes 18.8   % Monocytes 16.1   % Eosinophils 1.3   % Basophils 0.0   Immature Granulocytes 0.9   Absolute Neutrophil 1.4 (A)   Absolute Lymphocytes 0.4 (A)   Absolute Monocytes 0.4   Absolute Eosinophils 0.0   Absolute Basophils 0.0   Immature Grans (Abs) 0.02       Assessment and Plan:  1. Onc  SCC of the buccal mucosa, stage VIA ([id number]) s/p resection of the oral cavity of left neck dissection, R radical free flap reconstruction. Completed chemoradiation with HD cisplatin 6/3/20-7/17/20. Doing quite well after treatment with minimal complaints and good signs of recovery. Does have mild cytopenias still that will improve with time away from treatment.     Will see him back in 4 weeks with local labs and request his Leonard follow-up with PET in October (needs to be after 10/25 as patient will be on vacation).       2. ENT  Mucositis: Improving, continue Healios and salt/soda swishes     Throat pain: Improved. HasTylenol PRN.      Tinnitus: Improved, monitor and consider audiogram in future-he declines at this time.     Radiation dermatitis: Resolved.      3. GI/FEN  Nausea: Resolved     Constipation: Continue Senna tea PRN     Eating/drinking: States he is doing well, weight reported to be stable. Discussed continuing protein supplements and adding in more variety of foods. Recheck CMP this week with no concerning findings.      4. ID  No recurrent fevers.     5. Cards  Holding BP meds for now. Slightly higher today but will monitor and restart if continues to run high.    6. Endo  TSH low, will recheck in 4 weeks. Asymptomatic.     Nicholas Anderson PA-C  [facility]  [address] [place] [address] [phone]

## 2020-08-03 ENCOUNTER — VIRTUAL VISIT (OUTPATIENT)
Dept: RADIATION ONCOLOGY | Facility: CLINIC | Age: 60
End: 2020-08-03
Attending: RADIOLOGY
Payer: COMMERCIAL

## 2020-08-03 DIAGNOSIS — C06.0 SQUAMOUS CELL CANCER OF BUCCAL MUCOSA (H): Primary | ICD-10-CM

## 2020-08-03 NOTE — LETTER
8/3/2020         RE: Norris Reyes  [address]  [place] [address]        Dear Colleague,    Thank you for referring your patient, Norris Reyes, to the RADIATION ONCOLOGY CLINIC. Please see a copy of my visit note below.    Radiation Oncology Follow-up Visit  August 3, 2020    Norris Reyes is a 59 year old male who is being evaluated via a billable telephone visit.        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 Overall he feels he is doing well.  His weight is stable at 180lbs.  He is eating good.  Is not taking anything for pain.  He does still have some mucositis but it is improving everyday.  His skin is healed.  He did use silvadene for about a week but he no longer has any open areas.  He does notice that his jaw is stiff and he has been working hard on stretching that open.  His mouth is only slightly dry.  His taste is disrupted.  He has some fatigue, but is back to work.  He has no concerns     ROS:  Complete review of systems is negative except for symptoms discussed in subjective above.    Current Outpatient Medications   Medication     diphenhydrAMINE HCl (BENADRYL ALLERGY PO)     fenofibrate (TRIGLIDE/LOFIBRA) 160 MG tablet     mineral oil-hydrophilic petrolatum (AQUAPHOR) external ointment     multivitamins w/minerals (CERTAVITE) liquid     No current facility-administered medications for this visit.         No Known Allergies    Past Medical History:   Diagnosis Date     Autoimmune disease (H) 2020     Hypertension 2020    Losartan      Nephrolithiasis      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)          PHYSICAL EXAM:  There were no vitals taken for this visit.  Gen: Alert, in NAD        LABS AND IMAGING:  Reviewed.    IMPRESSION:   Mr. Reyes is a 59 year old male with SCC of left buccal mucosa  s/p chemoradiation now 2 weeks out since completion of treatment and doing well with slow improvement of acute side effects.    PLAN:   1.  Follow up with Dr. Gallegos in 1 month.   Continue close follow up with ENT and medical oncology.  2. Continue to moisturize with aquaphor and when skin is completely healed can change to preferred moisturizer.  Discussed importance of sun avoidance or sun protection.  3. Fatigue should continue to improve.  4. Continue oral cares with salt and soda rinses.  Routine dental follow up at least every 6 months.  Continue to use fluoride trays or fluoride treatments. Continue jaw, neck and swallowing exercises.  5. Treatment related pain should continue to diminish.  He is not taking anything for pain currently.  6. Continue to push fluids and caloric intake to maintain weight.        Nuha Dumont NP   Radiation Oncology

## 2020-08-03 NOTE — PROGRESS NOTES
"Radiation Oncology Follow-up Visit  August 3, 2020    Norris Reyes is a 59 year old male who is being evaluated via a billable telephone visit.      The patient has been notified of following:     \"This telephone visit will be conducted via a call between you and your physician/provider. We have found that certain health care needs can be provided without the need for a physical exam.  This service lets us provide the care you need with a short phone conversation.  If a prescription is necessary we can send it directly to your pharmacy.  If lab work is needed we can place an order for that and you can then stop by our lab to have the test done at a later time.    Telephone visits are billed at different rates depending on your insurance coverage. During this emergency period, for some insurers they may be billed the same as an in-person visit.  Please reach out to your insurance provider with any questions.    If during the course of the call the physician/provider feels a telephone visit is not appropriate, you will not be charged for this service.\"    Patient has given verbal consent for Telephone visit?  Yes    What phone number would you like to be contacted at?     How would you like to obtain your AVS? 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 Overall he feels he is doing well.  His weight is stable at 180lbs.  He is eating good.  Is not taking anything for pain.  He does still have some mucositis but it is improving everyday.  His " skin is healed.  He did use silvadene for about a week but he no longer has any open areas.  He does notice that his jaw is stiff and he has been working hard on stretching that open.  His mouth is only slightly dry.  His taste is disrupted.  He has some fatigue, but is back to work.  He has no concerns     ROS:  Complete review of systems is negative except for symptoms discussed in subjective above.    Current Outpatient Medications   Medication     diphenhydrAMINE HCl (BENADRYL ALLERGY PO)     fenofibrate (TRIGLIDE/LOFIBRA) 160 MG tablet     mineral oil-hydrophilic petrolatum (AQUAPHOR) external ointment     multivitamins w/minerals (CERTAVITE) liquid     No current facility-administered medications for this visit.         No Known Allergies    Past Medical History:   Diagnosis Date     Autoimmune disease (H) 2/11/2020     Hypertension 2/11/2020    Losartan     Nephrolithiasis      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)          PHYSICAL EXAM:  There were no vitals taken for this visit.  Gen: Alert, in NAD        LABS AND IMAGING:  Reviewed.    IMPRESSION:   Mr. Reyes is a 59 year old male with SCC of left buccal mucosa  s/p chemoradiation now 2 weeks out since completion of treatment and doing well with slow improvement of acute side effects.    PLAN:   1.  Follow up with Dr. Gallegos in 1 month.   Continue close follow up with ENT and medical oncology.  2. Continue to moisturize with aquaphor and when skin is completely healed can change to preferred moisturizer.  Discussed importance of sun avoidance or sun protection.  3. Fatigue should continue to improve.  4. Continue oral cares with salt and soda rinses.  Routine dental follow up at least every 6 months.  Continue to use fluoride trays or fluoride treatments. Continue jaw, neck and swallowing exercises.  5. Treatment related pain should continue to diminish.  He is not taking anything for pain currently.  6. Continue to push fluids and caloric intake  to maintain weight.        Nuha Dumont, NP   Radiation Oncology

## 2020-08-04 ENCOUNTER — TELEPHONE (OUTPATIENT)
Dept: ONCOLOGY | Facility: CLINIC | Age: 60
End: 2020-08-04

## 2020-08-04 NOTE — TELEPHONE ENCOUNTER
Requested orders from message below faxed to [facility] at [phone].     Successful transmission verified by Right[employer].     Dee Suarez [facility]        ----- Message from Cristal Starr sent at 8/3/2020  4:40 PM CDT -----  Regarding: please fax lab orders  Hello,     Please fax lab orders from Nicholas Anderson (7/24) to [facility] at (f) [phone].     Thanks!  Cristal Starr  Clinic Coordinator  [facility]  [phone]

## 2020-08-30 NOTE — PROGRESS NOTES
"Norris Reyes is a 59 year old male who is being evaluated via a billable video visit.      The patient has been notified of following:     \"This video visit will be conducted via a call between you and your physician/provider. We have found that certain health care needs can be provided without the need for an in-person physical exam.  This service lets us provide the care you need with a video conversation.  If a prescription is necessary we can send it directly to your pharmacy.  If lab work is needed we can place an order for that and you can then stop by our lab to have the test done at a later time.    Video visits are billed at different rates depending on your insurance coverage.  Please reach out to your insurance provider with any questions.    If during the course of the call the physician/provider feels a video visit is not appropriate, you will not be charged for this service.\"    Patient has given verbal consent for Video visit? Yes  How would you like to obtain your AVS? MyChart  If you are dropped from the video visit, the video invite should be resent to: Send to e-mail at: [email or web address]  Will anyone else be joining your video visit? No       SEJAL Dallas      Video-Visit Details    Type of service:  Video Visit    Video Start Time: 1:08pm  Video End Time: 1:33pm    Originating Location (pt. Location): Home    Distant Location (provider location):  [facility]     Platform used for Video Visit: ASTER Chen      Oncology/Hematology Visit Note  Aug 31, 2020    Reason for Visit: Follow up of SCC L buccal mucosa     History of Present Illness: Norris Reyes is a 59 year old male with PMH of leukoplakia, HTN, HLD. 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  He underwent resection with L neck dissection and R radial freeflap graft on 4/23/20.  His course was complicated by wound bleeding in his mouth. He " had complications of wound dehiscence in the L buccal mucosa after oral feeding. He is was NPO and receiving tube feeds.  He met with Dr. Kurtz and Dr. Leonard and initiated concurrent chemoradiation. He started radiation on 6/3 and started HD cisplatin 6/8/20. Of note his NG tube was removed and he is trying to avoid port/PEG placement.      He completed radiation 7/17/20. Due to scheduling he only received 2 doses of HD cisplatin.      He was called today for oncology follow-up after treatment.     Interval History:  Mr. Reyes was called today for oncology follow-up. He is overall doing well. His main concern is trismus which has worsened since completion of treatment. He is doing exercises to help open his mouth further but it is still an issue. He is able to eat and drink normally as long as he cuts his food small. He is gaining weight. He also has swelling both in his mouth and externally during the day, worse with talking and eating and better with lying down and rest. He also has some numbness in his left neck at the area of his incision.    He denies fevers or chills. No headaches. He had one episode last week where he woke up and felt very dizzy like his eyes were moving and he might fall out of bed even though he wasn't moving. This resolved within a few minutes and has not happened again.     He does have intermittent tinnitus that is improving. He notes he couldn't hear crickets the other day but otherwise isn't too worried about his hearing and doesn't want an audiogram. He denies dysphagia. No mouth sores or pain. No chest pain, SOB, cough, nausea, vomiting, abdominal pain, bowel or bladder concerns, edema, bleeding issues, rashes, neuropathy. BP had been running high so he restarted his Losartan.     Current Outpatient Medications   Medication Sig Dispense Refill     diphenhydrAMINE HCl (BENADRYL ALLERGY PO)        fenofibrate (TRIGLIDE/LOFIBRA) 160 MG tablet Take 160 mg by mouth daily       mineral  oil-hydrophilic petrolatum (AQUAPHOR) external ointment Apply topically every 8 hours To neck incisions 396 g 1     multivitamins w/minerals (CERTAVITE) liquid 15 mLs by Per Feeding Tube route daily Take when on tube feeds 236 mL 1     Physical Examination:  Reported /80  Wt Readings from Last 10 Encounters:   07/16/20 82.6 kg (182 lb)   07/09/20 82.1 kg (181 lb)   07/02/20 85.3 kg (188 lb)   06/29/20 83.7 kg (184 lb 8 oz)   06/29/20 83.7 kg (184 lb 8 oz)   06/25/20 83.9 kg (185 lb)   06/18/20 84.2 kg (185 lb 9.6 oz)   06/11/20 88.5 kg (195 lb)   06/08/20 86.2 kg (190 lb)   05/20/20 88 kg (194 lb)     Video Physical Exam  General: Patient appears well in no acute distress. Normal body habitus.   Skin: No visualized rash or lesions on visualized skin  Eyes: EOMI, no erythema, sclera icterus or discharge noted  Resp: Appears to be breathing comfortably without accessory muscle usage, speaking in full sentences, no cough  MSK: Appears to have normal range of motion based on visualized movements  Neurologic: No apparent tremors, facial movements symmetric  Psych: Normal affect, alert and oriented     The rest of a comprehensive physical examination is deferred due to PHE (public health emergency) video restrictions    Laboratory Data:  No new labs to review       Assessment and Plan:  1. Onc  SCC of the buccal mucosa, stage VIA ([id number]) s/p resection of the oral cavity of left neck dissection, R radical free flap reconstruction. Completed chemoradiation with HD cisplatin 6/3/20-7/17/20. Doing quite well after treatment with minimal complaints and good signs of recovery. Does have symptoms concerning for lymphedema-will place referral.    Plan to see ENT and rad onc this week. Will have PET and see Dr. Leonard end of October.      2. ENT  Mucositis: Resolved. Can stop Healios and use salt/soda swishes PRN     Tinnitus: Improved, monitor and consider audiogram in future-he declines at this time.     Vertigo  episode: Since he only had one brief episode will hold off work-up for now but if it recurs will do BPPV testing and consider audiogram/ENT work-up if needed.    Swelling/trismus: Concern for progression lymphedema. Placed referral. Seeing ENT this week as well.      3. GI/FEN  Eating/drinking: States he is doing well, weight is improving per report. Do not have labs for today's visit but given he is eating and drinking well OK to wait to check labs when he comes for PET in October.      4. Cards  Back on Losartan, monitoring BP at home, improved. Continue fenofibrate.      5. Endo  TSH was low on prior check-he did not get labs drawn this week. Since he is asymptomatic OK to recheck with labs in October.     Nicholas Anderson PA-C  [facility]  [address], [place] [address] [phone]

## 2020-08-31 ENCOUNTER — VIRTUAL VISIT (OUTPATIENT)
Dept: ONCOLOGY | Facility: CLINIC | Age: 60
End: 2020-08-31
Attending: INTERNAL MEDICINE
Payer: COMMERCIAL

## 2020-08-31 DIAGNOSIS — C06.0 SQUAMOUS CELL CANCER OF BUCCAL MUCOSA (H): Primary | ICD-10-CM

## 2020-08-31 PROCEDURE — 40001009 ZZH VIDEO/TELEPHONE VISIT; NO CHARGE

## 2020-08-31 PROCEDURE — 99214 OFFICE O/P EST MOD 30 MIN: CPT | Mod: 95 | Performed by: PHYSICIAN ASSISTANT

## 2020-08-31 RX ORDER — LOSARTAN POTASSIUM 50 MG/1
50 TABLET ORAL DAILY
COMMUNITY

## 2020-08-31 NOTE — LETTER
"    8/31/2020         RE: Norris Reyes  [address] [address]  [place] [facility] [address]        Dear Colleague,    Thank you for referring your patient, Norris Ryees, to the [facility]. Please see a copy of my visit note below.    Norris Reyes is a 59 year old male who is being evaluated via a billable video visit.      The patient has been notified of following:     \"This video visit will be conducted via a call between you and your physician/provider. We have found that certain health care needs can be provided without the need for an in-person physical exam.  This service lets us provide the care you need with a video conversation.  If a prescription is necessary we can send it directly to your pharmacy.  If lab work is needed we can place an order for that and you can then stop by our lab to have the test done at a later time.    Video visits are billed at different rates depending on your insurance coverage.  Please reach out to your insurance provider with any questions.    If during the course of the call the physician/provider feels a video visit is not appropriate, you will not be charged for this service.\"    Patient has given verbal consent for Video visit? Yes  How would you like to obtain your AVS? MyChart  If you are dropped from the video visit, the video invite should be resent to: Send to e-mail at: [email or web address]  Will anyone else be joining your video visit? No       SEJAL Dallas      Video-Visit Details    Type of service:  Video Visit    Video Start Time: 1:08pm  Video End Time: 1:33pm    Originating Location (pt. Location): Home    Distant Location (provider location):  [facility]     Platform used for Video Visit: ASTER Chen      Oncology/Hematology Visit Note  Aug 31, 2020    Reason for Visit: Follow up of SCC L buccal mucosa     History of Present Illness: Norris Reyes is a 59 year old male with PMH of leukoplakia, " HTN, HLD. 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  He underwent resection with L neck dissection and R radial freeflap graft on 4/23/20.  His course was complicated by wound bleeding in his mouth. He had complications of wound dehiscence in the L buccal mucosa after oral feeding. He is was NPO and receiving tube feeds.  He met with Dr. Kurtz and Dr. Leonard and initiated concurrent chemoradiation. He started radiation on 6/3 and started HD cisplatin 6/8/20. Of note his NG tube was removed and he is trying to avoid port/PEG placement.      He completed radiation 7/17/20. Due to scheduling he only received 2 doses of HD cisplatin.      He was called today for oncology follow-up after treatment.     Interval History:  Mr. Reyes was called today for oncology follow-up. He is overall doing well. His main concern is trismus which has worsened since completion of treatment. He is doing exercises to help open his mouth further but it is still an issue. He is able to eat and drink normally as long as he cuts his food small. He is gaining weight. He also has swelling both in his mouth and externally during the day, worse with talking and eating and better with lying down and rest. He also has some numbness in his left neck at the area of his incision.    He denies fevers or chills. No headaches. He had one episode last week where he woke up and felt very dizzy like his eyes were moving and he might fall out of bed even though he wasn't moving. This resolved within a few minutes and has not happened again.     He does have intermittent tinnitus that is improving. He notes he couldn't hear crickets the other day but otherwise isn't too worried about his hearing and doesn't want an audiogram. He denies dysphagia. No mouth sores or pain. No chest pain, SOB, cough, nausea, vomiting, abdominal pain, bowel or bladder concerns, edema, bleeding issues, rashes, neuropathy. BP had been  running high so he restarted his Losartan.     Current Outpatient Medications   Medication Sig Dispense Refill     diphenhydrAMINE HCl (BENADRYL ALLERGY PO)        fenofibrate (TRIGLIDE/LOFIBRA) 160 MG tablet Take 160 mg by mouth daily       mineral oil-hydrophilic petrolatum (AQUAPHOR) external ointment Apply topically every 8 hours To neck incisions 396 g 1     multivitamins w/minerals (CERTAVITE) liquid 15 mLs by Per Feeding Tube route daily Take when on tube feeds 236 mL 1     Physical Examination:  Reported /80  Wt Readings from Last 10 Encounters:   07/16/20 82.6 kg (182 lb)   07/09/20 82.1 kg (181 lb)   07/02/20 85.3 kg (188 lb)   06/29/20 83.7 kg (184 lb 8 oz)   06/29/20 83.7 kg (184 lb 8 oz)   06/25/20 83.9 kg (185 lb)   06/18/20 84.2 kg (185 lb 9.6 oz)   06/11/20 88.5 kg (195 lb)   06/08/20 86.2 kg (190 lb)   05/20/20 88 kg (194 lb)     Video Physical Exam  General: Patient appears well in no acute distress. Normal body habitus.   Skin: No visualized rash or lesions on visualized skin  Eyes: EOMI, no erythema, sclera icterus or discharge noted  Resp: Appears to be breathing comfortably without accessory muscle usage, speaking in full sentences, no cough  MSK: Appears to have normal range of motion based on visualized movements  Neurologic: No apparent tremors, facial movements symmetric  Psych: Normal affect, alert and oriented     The rest of a comprehensive physical examination is deferred due to PHE (public health emergency) video restrictions    Laboratory Data:  No new labs to review       Assessment and Plan:  1. Onc  SCC of the buccal mucosa, stage VIA ([id number]) s/p resection of the oral cavity of left neck dissection, R radical free flap reconstruction. Completed chemoradiation with HD cisplatin 6/3/20-7/17/20. Doing quite well after treatment with minimal complaints and good signs of recovery. Does have symptoms concerning for lymphedema-will place referral.    Plan to see ENT and rad onc  this week. Will have PET and see Dr. Leonard end of October.      2. ENT  Mucositis: Resolved. Can stop Healios and use salt/soda swishes PRN     Tinnitus: Improved, monitor and consider audiogram in future-he declines at this time.     Vertigo episode: Since he only had one brief episode will hold off work-up for now but if it recurs will do BPPV testing and consider audiogram/ENT work-up if needed.    Swelling/trismus: Concern for progression lymphedema. Placed referral. Seeing ENT this week as well.      3. GI/FEN  Eating/drinking: States he is doing well, weight is improving per report. Do not have labs for today's visit but given he is eating and drinking well OK to wait to check labs when he comes for PET in October.      4. Cards  Back on Losartan, monitoring BP at home, improved. Continue fenofibrate.      5. Endo  TSH was low on prior check-he did not get labs drawn this week. Since he is asymptomatic OK to recheck with labs in October.     Nicholas Anderson PA-C  [facility]  [address] [place] [address]  [phone]

## 2020-09-02 ENCOUNTER — TRANSFERRED RECORDS (OUTPATIENT)
Dept: HEALTH INFORMATION MANAGEMENT | Facility: CLINIC | Age: 60
End: 2020-09-02

## 2020-09-02 ENCOUNTER — OFFICE VISIT (OUTPATIENT)
Dept: OTOLARYNGOLOGY | Facility: CLINIC | Age: 60
End: 2020-09-02
Payer: COMMERCIAL

## 2020-09-02 ENCOUNTER — OFFICE VISIT (OUTPATIENT)
Dept: RADIATION ONCOLOGY | Facility: CLINIC | Age: 60
End: 2020-09-02
Attending: RADIOLOGY
Payer: COMMERCIAL

## 2020-09-02 VITALS
WEIGHT: 178 LBS | RESPIRATION RATE: 15 BRPM | OXYGEN SATURATION: 97 % | HEART RATE: 71 BPM | SYSTOLIC BLOOD PRESSURE: 152 MMHG | HEIGHT: 71 IN | BODY MASS INDEX: 24.92 KG/M2 | TEMPERATURE: 97.8 F | DIASTOLIC BLOOD PRESSURE: 95 MMHG

## 2020-09-02 VITALS — SYSTOLIC BLOOD PRESSURE: 152 MMHG | BODY MASS INDEX: 24.14 KG/M2 | WEIGHT: 178 LBS | DIASTOLIC BLOOD PRESSURE: 95 MMHG

## 2020-09-02 DIAGNOSIS — C06.0 SQUAMOUS CELL CANCER OF BUCCAL MUCOSA (H): Primary | ICD-10-CM

## 2020-09-02 DIAGNOSIS — R22.1 NECK MASS: ICD-10-CM

## 2020-09-02 PROCEDURE — 88172 CYTP DX EVAL FNA 1ST EA SITE: CPT | Performed by: OTOLARYNGOLOGY

## 2020-09-02 PROCEDURE — G0463 HOSPITAL OUTPT CLINIC VISIT: HCPCS | Mod: 25 | Performed by: RADIOLOGY

## 2020-09-02 PROCEDURE — 00000155 ZZHCL STATISTIC H-CELL BLOCK W/STAIN: Performed by: OTOLARYNGOLOGY

## 2020-09-02 PROCEDURE — 88305 TISSUE EXAM BY PATHOLOGIST: CPT | Performed by: OTOLARYNGOLOGY

## 2020-09-02 PROCEDURE — 88173 CYTOPATH EVAL FNA REPORT: CPT | Performed by: OTOLARYNGOLOGY

## 2020-09-02 ASSESSMENT — MIFFLIN-ST. JEOR: SCORE: 1649.53

## 2020-09-02 ASSESSMENT — PAIN SCALES - GENERAL: PAINLEVEL: NO PAIN (0)

## 2020-09-02 NOTE — PROGRESS NOTES
PRIOR ONCOLOGIC HISTORY:  Mr. Reyes is a 59-year-old gentleman who is status post a wide local excision of left buccal mucosa and left neck dissection, levels IB through IV, on 4/23/2020.  He was pathologically staged as a T4 N3b due to external extension in the neck.  His marginal mandibular nerve had to be sacrificed during the surgery due to the large leelee disease in level Ib.  Dr. Castañeda performed a radial forearm free flap and his final pathology revealed negative margins with 5 out of 41 lymph nodes positive, with the largest being 5.7 cm and positive extranodal extension.  He completed chemoradiotherapy on 7/18/2020.  During that time, he did not have a feeding tube.  He reports he took no pain medication.  He also received high-dose cisplatin for a total of two to three doses.      INTERVAL HISTORY:  The patient reports he has been eating and drinking okay, but he does notice trismus, which is only about 1/4 inch in the morning and after he stretches it out, it gets big enough that he can put his thumb in his mouth.  He has not noticed any pain in his oral cavity.  He has had no otalgia, odynophagia, dysphagia or hoarseness.  Dr. Kurtz did notice a lump that is about 1 cm in size in the midline portion of the neck.  The patient himself has noticed it and thought it was bigger previously, but now feels like it has gone down a little bit.  He denies any lumps or bumps elsewhere in his neck.  He has had no bleeding in the oral cavity and no difficulty breathing.      PHYSICAL EXAMINATION:  He is well-appearing, in no distress.  Examination of the oral cavity reveals he does indeed have trismus to about 1 cm interincisal distance.  The free flap looks healthy reconstructing the left buccal mucosa.  It does not feel significantly taut when he opens his mouth.  Visual inspection and palpation of the free flap and the reconstructed buccal mucosa is normal.  The remainder of the oral cavity including the tongue,  the right buccal mucosa, floor of mouth, hard and soft palate, and gingiva are negative.  Palpation of floor of mouth, tongue and base of tongue are negative.  Palpation of the buccal mucosa is negative.  He cannot tolerate mirror exam.  Examination of the face and neck reveals his marginal mandibular nerve is VI/VI given the resection at the time of surgery.  Examination of the neck reveals some lymphedema, especially in the midline.  He does just below that over the level of the thyroid notch, have an approximately 1 cm nodule in the subcutaneous tissue.  I believe I can separate it from the thyroid cartilage, although it is difficult to characterize completely due to his lymphedema.      IMPRESSION:  Small midline neck mass.      PLAN:  I would like to get a fine needle biopsy of this today just to ensure that there is nothing sinister here.  If this turns out to be negative, we will reevaluate at the time of his PET scan, which is six weeks from now, and I will see him after his PET scan.           cc:        Opal Castañeda MD   [facility]   Department of Otolaryngology    [address] [address]         Dwayne Kurtz MD   [facility] Radiation Oncology          Aris Grider MD   [facility]   [address] [address]   [place] [address]

## 2020-09-02 NOTE — PROGRESS NOTES
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 He underwent oral cavity resection with left neck dissection and free flap reconstruction on 2020. Surgical pathology revealed a 5.1 cm moderate to poorly differentiated squamous cell carcinoma with focal sarcomatoid differentiation. The depth of invasion was 1.2 cm. There was no lymphovascular space invasion or perineural invasion. The soft tissue margins were negative with the periosteal margin initially felt to be positive however, following review in multidisciplinary tumor board, this was felt to be artifact related to specimen acquisition and processing. There was metastatic disease found within several lymph nodes from left levels I-III with extranodal extension noted within a left level IB node. He received adjuvant chemoradiotherapy as described above with concurrent high-dose cisplatin for improved locoregional disease control.    Mr. Reyes returns to radiation oncology clinic today for a routine post-treatment disease surveillance visit. On exam, his biggest complaint is related to persistent trismus which has been chronic since surgery and chemoradiotherapy. He describes his symptoms as worse in the morning with  minimal incisal opening and he actively stretches his jaw throughout the day although is only able to maintain approximately 20 mm of incisal opening on average. He also has moderate submental lymphedema related to his prior therapies and has recently been contacted by the lymphedema therapy specialists to set up therapy closer to home for him. He has noted a new, small firm nodule within the midline neck just medial to the left neck dissection incision which has been present for the past several weeks but has not appreciably changed in size during that interval. No additional new head and neck masses. He is eating a soft/puréed diet without difficulty and he denies any pain/odynophagia with his remaining ROS otherwise negative.    PHYSICAL EXAM:  Weight: 80.7 kg  BP: 152/95    Physical Exam  Constitutional:       General: He is not in acute distress.  HENT:      Head: Normocephalic and atraumatic.      Mouth/Throat:      Comments: Moderate trismus with approximately 20 mm incisal opening. Mildly dry mucous membranes. Flap is healthy-appearing with incomplete visualization of the posterior aspect of the flap and surrounding mucosa secondary to the patient's trismus. Resolving mucositis involving the left lateral aspect of the oral tongue, left mandibular gingiva and left buccal mucosa surrounding the graft.  Eyes:      Extraocular Movements: Extraocular movements intact.      Pupils: Pupils are equal, round, and reactive to light.   Neck:      Musculoskeletal: Edema (Moderate submental lymphedema extending inferiorly to the superior aspect of the left neck dissection incision) present.      Comments: There is a small 0.5 cm firm but mobile nodule within the subcutaneous tissues within the midline neck just medial to the edge of the prior left neck dissection incision. No surrounding cutaneous changes. No additional palpable cervical adenopathy.  Pulmonary:      Effort: Pulmonary effort is normal. No respiratory  distress.      Breath sounds: No stridor. No rhonchi.   Skin:     General: Skin is warm.      Comments: Mild hyperpigmentation of the left lateral face and neck. No desquamation or ulceration.   Neurological:      General: No focal deficit present.      Mental Status: He is alert.      Cranial Nerves: Cranial nerve deficit (Moderate weakness of the marginal mandibular branch of left cranial nerve VII) present.        LABS AND IMAGIN2020 labs:  Electrolytes: WNL  TSH: 0.37  WBC: 2.2  Hemoglobin: 11.0  Platelets: 190    IMPRESSION:   Mr. 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 He is currently just over 6 weeks out from the completion of adjuvant treatment and continues to make a slow recovery from his acute treatment-related toxicities.    PLAN:   1. Follow-up in radiation oncology clinic after 10/26/2020 PET/CT  2. Follow-up with ENT (Dr. Dior) later this afternoon for ongoing disease surveillance and evaluation of new neck nodule  3. Continue jaw stretching/SLP exercises  4. Initiate lymphedema therapy exercises as soon as possible to help reduce submental and cervical lymphedema    Dwayne Kurtz MD/PhD    Department of Radiation Oncology  [facility]

## 2020-09-02 NOTE — LETTER
9/2/2020       RE: Norris Reyes  [address]  [place] [facility] [place] [address]     Dear Colleague,    Thank you for referring your patient, Norris Reyes, to the [facility] EAR NOSE AND THROAT at [facility]. Please see a copy of my visit note below.    PRIOR ONCOLOGIC HISTORY:  Mr. Reyes is a 59-year-old gentleman who is status post a wide local excision of left buccal mucosa and left neck dissection, levels IB through IV, on 4/23/2020.  He was pathologically staged as a T4 N3b due to external extension in the neck.  His marginal mandibular nerve had to be sacrificed during the surgery due to the large leelee disease in level Ib.  Dr. Castañeda performed a radial forearm free flap and his final pathology revealed negative margins with 5 out of 41 lymph nodes positive, with the largest being 5.7 cm and positive extranodal extension.  He completed chemoradiotherapy on 7/18/2020.  During that time, he did not have a feeding tube.  He reports he took no pain medication.  He also received high-dose cisplatin for a total of two to three doses.      INTERVAL HISTORY:  The patient reports he has been eating and drinking okay, but he does notice trismus, which is only about 1/4 inch in the morning and after he stretches it out, it gets big enough that he can put his thumb in his mouth.  He has not noticed any pain in his oral cavity.  He has had no otalgia, odynophagia, dysphagia or hoarseness.  Dr. Kurtz did notice a lump that is about 1 cm in size in the midline portion of the neck.  The patient himself has noticed it and thought it was bigger previously, but now feels like it has gone down a little bit.  He denies any lumps or bumps elsewhere in his neck.  He has had no bleeding in the oral cavity and no difficulty breathing.      PHYSICAL EXAMINATION:  He is well-appearing, in no distress.  Examination of the oral cavity reveals he does indeed have trismus to about 1 cm interincisal  distance.  The free flap looks healthy reconstructing the left buccal mucosa.  It does not feel significantly taut when he opens his mouth.  Visual inspection and palpation of the free flap and the reconstructed buccal mucosa is normal.  The remainder of the oral cavity including the tongue, the right buccal mucosa, floor of mouth, hard and soft palate, and gingiva are negative.  Palpation of floor of mouth, tongue and base of tongue are negative.  Palpation of the buccal mucosa is negative.  He cannot tolerate mirror exam.  Examination of the face and neck reveals his marginal mandibular nerve is VI/VI given the resection at the time of surgery.  Examination of the neck reveals some lymphedema, especially in the midline.  He does just below that over the level of the thyroid notch, have an approximately 1 cm nodule in the subcutaneous tissue.  I believe I can separate it from the thyroid cartilage, although it is difficult to characterize completely due to his lymphedema.      IMPRESSION:  Small midline neck mass.      PLAN:  I would like to get a fine needle biopsy of this today just to ensure that there is nothing sinister here.  If this turns out to be negative, we will reevaluate at the time of his PET scan, which is six weeks from now, and I will see him after his PET scan.           cc:        Opal Castañeda MD   [facility]   Department of Otolaryngology    [address] [address]         Dwayne Kurtz MD   [facility] Radiation Oncology          Aris Grider MD   [facility]   [address] [place] [address], [address]   [place] [address]         Again, thank you for allowing me to participate in the care of your patient.      Sincerely,    Kameron Dior MD

## 2020-09-02 NOTE — NURSING NOTE
"Chief Complaint   Patient presents with     RECHECK     follow up      Blood pressure (!) 152/95, pulse 71, temperature 97.8  F (36.6  C), resp. rate 15, height 1.803 m (5' 11\"), weight 80.7 kg (178 lb), SpO2 97 %.    Travis Kaur LPN    "

## 2020-09-02 NOTE — PROGRESS NOTES
FOLLOW-UP VISIT    Patient Name: Norris Reyes      : 1960     Age: 59 year old        ______________________________________________________________________________     Chief Complaint   Patient presents with     Cancer     Pt is here for a follow upSCC of buccal mucosa: oral cavity 6600 cGy completed 20     BP (!) 152/95   Wt 80.7 kg (178 lb)   BMI 24.14 kg/m       Pain  Denies    Labs  Other Labs: No    Imaging  None      Dental:   Most Recent Dental Visit: No      Speech/Swallowing:   Most Recent evaluation or testing: No  Swallowing Restrictions: No difficulties with swallowing    Trismus/Jaw Exercises: Yes    Nutrition:    Weight:   Wt Readings from Last 3 Encounters:   20 80.7 kg (178 lb)   20 82.6 kg (182 lb)   20 82.1 kg (181 lb)         Oral Symptoms:   Xerostomia:0- None  Dysphagia: 0-None  Mucositis Oral Symptoms: 0-None  Mucositis: 0- None  Esophagitis:0- None    Other Appointments:     MD Name: Dr Dior Appointment Date: today   MD Name: Appointment Date:   MD Name: Appointment Date:   Other Appointment Notes:     Residual Radiation side effect: lymphedema, trisimus     Additional Instructions:     Nurse face-to-face time: Level 3:  10 min face to face time

## 2020-09-02 NOTE — LETTER
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 He underwent oral cavity resection with left neck dissection and free flap reconstruction on 2020. Surgical pathology revealed a 5.1 cm moderate to poorly differentiated squamous cell carcinoma with focal sarcomatoid differentiation. The depth of invasion was 1.2 cm. There was no lymphovascular space invasion or perineural invasion. The soft tissue margins were negative with the periosteal margin initially felt to be positive however, following review in multidisciplinary tumor board, this was felt to be artifact related to specimen acquisition and processing. There was metastatic disease found within several lymph nodes from left levels I-III with extranodal extension noted within a left level IB node. He received adjuvant chemoradiotherapy as described above with concurrent high-dose cisplatin for improved locoregional disease control.    Mr. Reyes returns to radiation oncology clinic today for a routine post-treatment disease surveillance visit. 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 He describes his symptoms as worse in the morning with minimal incisal opening and he actively stretches his jaw throughout the day although is only able to maintain approximately 20 mm of incisal opening on average. He also has moderate submental lymphedema related to his prior therapies and has recently been contacted by the lymphedema therapy specialists to set up therapy closer to home for him. He has noted a new, small firm nodule within the midline neck just medial to the left neck dissection incision which has been present for the past several weeks but has not appreciably changed in size during that interval. No additional new head and neck masses. He is eating a soft/puréed diet without difficulty and he denies any pain/odynophagia with his remaining ROS otherwise negative.    PHYSICAL EXAM:  Weight: 80.7 kg  BP: 152/95    Physical Exam  Constitutional:       General: He is not in acute distress.  HENT:      Head: Normocephalic and atraumatic.      Mouth/Throat:      Comments: Moderate trismus with approximately 20 mm incisal opening. Mildly dry mucous membranes. Flap is healthy-appearing with incomplete visualization of the posterior aspect of the flap and surrounding mucosa secondary to the patient's trismus. Resolving mucositis involving the left lateral aspect of the oral tongue, left mandibular gingiva and left buccal mucosa surrounding the graft.  Eyes:      Extraocular Movements: Extraocular movements intact.      Pupils: Pupils are equal, round, and reactive to light.   Neck:      Musculoskeletal: Edema (Moderate submental lymphedema extending inferiorly to the superior aspect of the left neck dissection incision) present.      Comments: There is a small 0.5 cm firm but mobile nodule within the subcutaneous tissues within the midline neck just medial to the edge of the prior left neck dissection incision. No surrounding cutaneous changes. No additional palpable  cervical adenopathy.  Pulmonary:      Effort: Pulmonary effort is normal. No respiratory distress.      Breath sounds: No stridor. No rhonchi.   Skin:     General: Skin is warm.      Comments: Mild hyperpigmentation of the left lateral face and neck. No desquamation or ulceration.   Neurological:      General: No focal deficit present.      Mental Status: He is alert.      Cranial Nerves: Cranial nerve deficit (Moderate weakness of the marginal mandibular branch of left cranial nerve VII) present.        LABS AND IMAGIN2020 labs:  Electrolytes: WNL  TSH: 0.37  WBC: 2.2  Hemoglobin: 11.0  Platelets: 190    IMPRESSION:   Mr. 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 He is currently just over 6 weeks out from the completion of adjuvant treatment and continues to make a slow recovery from his acute treatment-related toxicities.    PLAN:   1. Follow-up in radiation oncology clinic after 10/26/2020 PET/CT  2. Follow-up with ENT (Dr. Dior) later this afternoon for ongoing disease surveillance and evaluation of new neck nodule  3. Continue jaw stretching/SLP exercises  4. Initiate lymphedema therapy exercises as soon as possible to help reduce submental and cervical lymphedema    Dwayne Kurtz MD/PhD    Department of Radiation Oncology  [facility]      FOLLOW-UP VISIT    Patient Name: Norris Reyes      : 1960     Age: 59 year old        ______________________________________________________________________________     Chief Complaint   Patient presents with     Cancer     Pt is here for a follow upSCC of buccal mucosa: oral cavity 6600 cGy completed 20     BP (!) 152/95   Wt 80.7 kg (178 lb)   BMI 24.14 kg/m       Pain  Denies    Labs  Other Labs: No    Imaging  None      Dental:   Most Recent Dental Visit:  No      Speech/Swallowing:   Most Recent evaluation or testing: No  Swallowing Restrictions: No difficulties with swallowing    Trismus/Jaw Exercises: Yes    Nutrition:    Weight:   Wt Readings from Last 3 Encounters:   09/02/20 80.7 kg (178 lb)   07/16/20 82.6 kg (182 lb)   07/09/20 82.1 kg (181 lb)         Oral Symptoms:   Xerostomia:0- None  Dysphagia: 0-None  Mucositis Oral Symptoms: 0-None  Mucositis: 0- None  Esophagitis:0- None    Other Appointments:     MD Name: Dr Dior Appointment Date: today   MD Name: Appointment Date:   MD Name: Appointment Date:   Other Appointment Notes:     Residual Radiation side effect: lymphedema, trisimus     Additional Instructions:     Nurse face-to-face time: Level 3:  10 min face to face time            Dwayne Kurtz MD

## 2020-09-03 ENCOUNTER — RESULTS ONLY (OUTPATIENT)
Dept: LAB | Facility: CLINIC | Age: 60
End: 2020-09-03

## 2020-09-04 ENCOUNTER — TUMOR CONFERENCE (OUTPATIENT)
Dept: ONCOLOGY | Facility: CLINIC | Age: 60
End: 2020-09-04
Payer: COMMERCIAL

## 2020-09-04 DIAGNOSIS — C06.0 SQUAMOUS CELL CANCER OF BUCCAL MUCOSA (H): Primary | ICD-10-CM

## 2020-09-04 LAB — COPATH REPORT: NORMAL

## 2020-09-04 NOTE — PROGRESS NOTES
Called and spoke with patient regarding tumor board discussion. Reviewed with patient that FNA is still pending but on preliminary look with pathology it appears there were some possible concerning cells. Reviewed that if positive we would like for patient to obtain PET scan to evaluate and pending PET results would determine the need for salvage surgery.     Patient in agreement with plan. He is scheduled for PET scan on Wednesday 9/9 at 10:30am.  We will contact him with FNA results as soon as completed. If still pending on Tuesday will determine if patient should continue with plan for PET or hold for results. Patient verbalized understanding and was encouraged to call with further questions or concerns.     Chani Elise, RN, BSN

## 2020-09-04 NOTE — PROGRESS NOTES
Head & Neck Tumor Conference Note         Status: Established  Staff: Dr. Dior     Tumor Site: Left buccal mucosa  Tumor Pathology: SCC  Tumor Stage: pT4a pN3b M0  Tumor Treatment:   - WLE left buccal mucosa, left neck dissection 1b-4, marginal mandibulectomy, right radial forearm free flap 4/23/20  -Chemoradiation completed 7/18/20    Reason for Review: Review imaging, path, and POC     Brief History: This is a 59 year old male with squamous cell carcinoma of the left buccal mucosa.  The patient underwent a WLE of the left buccal mucosa, left neck dissection 1b-4, marginal mandibulectomy and right RFF on 4/23/20. Final pathology showed sarcomatoid features, a positive deep periosteal margin, DOI of 12mm, no PNI or LVI, and four positive lymph nodes (largest 5.7cm in Level IB with GLADYS). The pathology was reviewed and the positive deep margin is likely due to the way the margin was taken. The patient has developed a small midline neck mass and FNA was performed and is pending.      Pertinent PMH:   Past Medical History        Past Medical History:   Diagnosis Date     Autoimmune disease (H) 2/11/2020     Hypertension 2/11/2020     Losartan     Nephrolithiasis       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)           Smoking Hx:   Social History            Tobacco Use     Smoking status: Never Smoker     Smokeless tobacco: Never Used   Substance Use Topics     Alcohol use: Yes       Comment: 1 drink a day but not currently     Drug use: Never      Pathology: FNA pending     Tumor Board Recommendation:   Discussed following up his biopsy and getting a PET scan now anyway. We would consider salvage surgery if these are positive.     Plan: PET scan, follow up FNA    Diego Martinez MD   Otolaryngology- Head and Neck Surgery     Documentation / Disclaimer Cancer Tumor Board Note  Cancer tumor board recommendations do not override what is determined to be reasonable care and treatment, which is dependent on the  circumstances of a patient's case; the patient's medical, social, and personal concerns; and the clinical judgment of the oncologist [physician].

## 2020-09-08 ENCOUNTER — PATIENT OUTREACH (OUTPATIENT)
Dept: OTOLARYNGOLOGY | Facility: CLINIC | Age: 60
End: 2020-09-08

## 2020-09-08 NOTE — PROGRESS NOTES
Called and spoke with patient regarding the following pathology results:    CYTOLOGIC INTERPRETATION:     Neck, mass , palpation guided fine needle aspiration:   - Positive for malignancy, cytomorphologically consistent with squamous   cell carcinoma.   Specimen Adequacy: Satisfactory for evaluation.     Reviewed plan with patient again to move forward with PET scan. PET scan was moved to Friday per radiology scheduling. Patient was informed of date, time and instructions. We will review PET at tumor board on Friday and make plan based on results. Patient agreeable and was encouraged to call sooner with further questions or concerns.     Chani Elise, RN, BSN

## 2020-09-11 ENCOUNTER — HOSPITAL ENCOUNTER (OUTPATIENT)
Dept: PET IMAGING | Facility: CLINIC | Age: 60
End: 2020-09-11
Attending: OTOLARYNGOLOGY
Payer: COMMERCIAL

## 2020-09-11 DIAGNOSIS — C06.0 SQUAMOUS CELL CANCER OF BUCCAL MUCOSA (H): ICD-10-CM

## 2020-09-11 PROCEDURE — A9552 F18 FDG: HCPCS | Performed by: OTOLARYNGOLOGY

## 2020-09-11 PROCEDURE — 70491 CT SOFT TISSUE NECK W/DYE: CPT

## 2020-09-11 PROCEDURE — 25000128 H RX IP 250 OP 636: Performed by: OTOLARYNGOLOGY

## 2020-09-11 PROCEDURE — 71260 CT THORAX DX C+: CPT

## 2020-09-11 PROCEDURE — 34300033 ZZH RX 343: Performed by: OTOLARYNGOLOGY

## 2020-09-11 RX ORDER — IOPAMIDOL 755 MG/ML
45-135 INJECTION, SOLUTION INTRAVASCULAR ONCE
Status: COMPLETED | OUTPATIENT
Start: 2020-09-11 | End: 2020-09-11

## 2020-09-11 RX ADMIN — FLUDEOXYGLUCOSE F-18 10.73 MCI.: 500 INJECTION, SOLUTION INTRAVENOUS at 08:30

## 2020-09-11 RX ADMIN — IOPAMIDOL 109 ML: 755 INJECTION, SOLUTION INTRAVENOUS at 09:41

## 2020-09-14 ENCOUNTER — PATIENT OUTREACH (OUTPATIENT)
Dept: OTOLARYNGOLOGY | Facility: CLINIC | Age: 60
End: 2020-09-14

## 2020-09-14 NOTE — PROGRESS NOTES
Called patient with the following PET scan results:  IMPRESSION:   In this patient with a history of squamous cell carcinoma of the left  cheek status post surgical resection and chemoradiation:  1. There is evidence of recurrent/metastatic disease in the chest with  multiple hypermetabolic lymph nodes in the upper mediastinum and right  hilum.  2. Unchanged small pulmonary nodules as detailed above. Attention on  follow-up  3. No evidence of metastatic disease in the abdomen, pelvis or  extremities.  4. Indeterminate density, possibly proteinaceous or hemorrhagic, cyst  again noted in the left kidney without significant interval change. As  noted on prior exam ultrasound could be considered for further  assessment.  5. Splenomegaly, new from prior exam. No focal mass.    Reviewed with patient that unfortunately it appears from the PET scan there is some metastatic disease to his lung. Writer discussed with patient that we will reach out to Dr. Leonard to determine follow-up with him as well as to question if a biopsy is needed. We will contact patient with follow-up plan. Patient was in agreement with plan and was encouraged to call with further questions or concerns.    Chani Elise, RN, BSN

## 2020-09-16 ENCOUNTER — TELEPHONE (OUTPATIENT)
Dept: ONCOLOGY | Facility: CLINIC | Age: 60
End: 2020-09-16

## 2020-09-16 NOTE — TELEPHONE ENCOUNTER
Requested face sheet from message below faxed to [employer] at [phone].     Successful transmission verified by [employer].     Dee Suarez CMA    ----- Message from Gloria Garcia RN sent at 9/15/2020  5:11 PM CDT -----  Hi team,     Can you pls. Fax the patient's face sheet to [employer] at Fax: [phone].     Thanks so much!    Gloria

## 2020-09-17 ENCOUNTER — PREP FOR PROCEDURE (OUTPATIENT)
Dept: SURGERY | Facility: CLINIC | Age: 60
End: 2020-09-17

## 2020-09-17 DIAGNOSIS — C76.0 HEAD AND NECK CANCER (H): Primary | ICD-10-CM

## 2020-09-18 ENCOUNTER — TELEPHONE (OUTPATIENT)
Dept: PULMONOLOGY | Facility: CLINIC | Age: 60
End: 2020-09-18

## 2020-09-18 ENCOUNTER — TUMOR CONFERENCE (OUTPATIENT)
Dept: ONCOLOGY | Facility: CLINIC | Age: 60
End: 2020-09-18
Payer: COMMERCIAL

## 2020-09-18 ENCOUNTER — HOSPITAL ENCOUNTER (OUTPATIENT)
Facility: CLINIC | Age: 60
End: 2020-09-18
Attending: INTERNAL MEDICINE | Admitting: INTERNAL MEDICINE
Payer: COMMERCIAL

## 2020-09-18 DIAGNOSIS — Z11.59 ENCOUNTER FOR SCREENING FOR OTHER VIRAL DISEASES: Primary | ICD-10-CM

## 2020-09-18 DIAGNOSIS — C76.0 HEAD AND NECK CANCER (H): ICD-10-CM

## 2020-09-18 NOTE — PROGRESS NOTES
Head & Neck Tumor Conference Note        Status: Established  Staff: Dr. Dior     Tumor Site: Left buccal mucosa  Tumor Pathology: SCC  Tumor Stage: [id number]  Tumor Treatment:   - WLE left buccal mucosa, left modified radical neck dissection (levels Ib-IV), marginal mandibulectomy, right radial forearm free flap 4/23/20  -Chemoradiation completed 7/18/20     Reason for Review: Review imaging, path, and POC     Brief History: This is a 59 year old male with SCC of the left buccal mucosa.  The patient underwent a WLE of the left buccal mucosa, left modified radical neck dissection (levels Ib-IV), marginal mandibulectomy and right RFF on 4/23/20. Final pathology showed sarcomatoid features, a positive deep periosteal margin, DOI of 12mm, no PNI or LVI, and four positive lymph nodes (largest 5.7cm in Level IB with GLADYS). The pathology was reviewed and the positive deep margin is likely due to the way the margin was taken. The patient has since developed a small midline neck mass and FNA was performed.     Pertinent PMH:   Past Medical History:   Diagnosis Date     Autoimmune disease (H) 2/11/2020     Hypertension 2/11/2020    Losartan     Nephrolithiasis      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)         Smoking Hx:   Social History     Tobacco Use     Smoking status: Never Smoker     Smokeless tobacco: Never Used   Substance Use Topics     Alcohol use: Yes     Comment: 1 drink a day but not currently     Drug use: Never       Imaging:   PET/CT Neck: (9/11/20):  1. Hypermetabolic and centrally necrotic lymph nodes in level III on  the right, an anterior midline at level VI consistent with recurrent  leelee disease.  2. Postsurgical changes consistent with left buccal lesion resection,  left cervical lymph node dissection, marginal mandibulectomy and free  flap reconstruction. Focal FDG uptake in the left retromolar trigone  region and surrounding the residual left maxillary molar tooth without  corresponding CT  abnormality. Correlation with clinical examination  findings and attention on follow-up is recommended.  3. Please see separate report of the PET/CT of the whole body for  findings in the upper thorax, including suspicious lymphadenopathy in  the upper mediastinum and right hilum.    PET/CT Whole Body (9/11/20):  1. There is evidence of recurrent/metastatic disease in the chest with  multiple hypermetabolic lymph nodes in the upper mediastinum and right  hilum.  2. Unchanged small pulmonary nodules as detailed above. Attention on  follow-up  3. No evidence of metastatic disease in the abdomen, pelvis or  extremities.  4. Indeterminate density, possibly proteinaceous or hemorrhagic, cyst  again noted in the left kidney without significant interval change. As  noted on prior exam ultrasound could be considered for further  assessment.  5. Splenomegaly, new from prior exam. No focal mass.    Pathology:   FNA, midline neck mass (9/2/20):   - Positive for malignancy, cytomorphologically consistent with squamous cell carcinoma.   Specimen Adequacy: Satisfactory for evaluation.     Tumor Board Recommendation:   Discussion: Reviewed images, demonstrates recurrent leelee disease and suspicion for metastatic disease in the chest. There is also an area of concern on the left maxillary molar, however clinical exam on 9/2/20 did not show evidence of local recurrence. Discussed management options given new findings, would plan for systemic therapy with chemo/pembro. Planning for EBUS to evaluate the chest nodules. He should not have any issues with airway obstruction but he does have trismus with ~1cm interincisal distance. If he is having trismus he would be safest for an awake fiberoptic intubation for the procedure.     Plan:   - referral to med/onc for systemtic therapy  - fiberoptic intubation for EBUS unless has adequate mouth opening    Jhon Bee MD PGY-3  Otolaryngology- Head and Neck Surgery    Documentation /  Disclaimer Cancer Tumor Board Note  Cancer tumor board recommendations do not override what is determined to be reasonable care and treatment, which is dependent on the circumstances of a patient's case; the patient's medical, social, and personal concerns; and the clinical judgment of the oncologist [physician].

## 2020-09-18 NOTE — TELEPHONE ENCOUNTER
Spoke with patient to schedule procedure with Dr. Manuel Santos   Procedure was scheduled on 09/25 at [facility] OR  Patient will have H&P with PAC - virtual    Patient is aware a COVID-19 test is needed before their procedure. The test should be with-in 4 days of their procedure.   Test Details: TBD     Patient is aware a / is needed day of surgery.   Surgery letter was sent via [employer], patient has my direct contact information for any further questions.

## 2020-09-21 NOTE — TELEPHONE ENCOUNTER
FUTURE VISIT INFORMATION      SURGERY INFORMATION:    Date: 20    Location: [facility] OR    Surgeon:  Manuel Santos MD     Anesthesia Type:  General    Procedure: Flexible bronchoscopy, endobronchial ultrasound with transbronchial biopsies     RECORDS REQUESTED FROM:       Primary Care Provider: Moses Nicole    Most recent EKG+ Tracin/7/15- Moses

## 2020-09-23 ENCOUNTER — PRE VISIT (OUTPATIENT)
Dept: SURGERY | Facility: CLINIC | Age: 60
End: 2020-09-23

## 2020-09-24 LAB — LAB SCANNED RESULT: NORMAL

## 2020-10-30 ENCOUNTER — TELEPHONE (OUTPATIENT)
Dept: ONCOLOGY | Facility: CLINIC | Age: 60
End: 2020-10-30

## 2020-10-30 NOTE — TELEPHONE ENCOUNTER
Genetic testing mailed to patient, per request below.    Marlin Crocker CMA ([facility])    ----- Message from Gloria Garcia RN sent at 10/29/2020  4:15 PM CDT -----  Hi,    I need a genetics report mailed to this patient.     Gloria

## 2020-12-02 ENCOUNTER — PATIENT OUTREACH (OUTPATIENT)
Dept: ONCOLOGY | Facility: CLINIC | Age: 60
End: 2020-12-02

## 2021-01-04 ENCOUNTER — HEALTH MAINTENANCE LETTER (OUTPATIENT)
Age: 61
End: 2021-01-04

## 2021-02-11 PROBLEM — C06.0 SQUAMOUS CELL CANCER OF BUCCAL MUCOSA (H): Status: ACTIVE | Noted: 2020-03-18

## 2021-05-28 ENCOUNTER — TRANSFERRED RECORDS (OUTPATIENT)
Dept: HEALTH INFORMATION MANAGEMENT | Facility: CLINIC | Age: 61
End: 2021-05-28

## 2021-06-22 ENCOUNTER — TRANSFERRED RECORDS (OUTPATIENT)
Dept: HEALTH INFORMATION MANAGEMENT | Facility: CLINIC | Age: 61
End: 2021-06-22

## 2021-10-10 ENCOUNTER — HEALTH MAINTENANCE LETTER (OUTPATIENT)
Age: 61
End: 2021-10-10

## 2022-01-30 ENCOUNTER — HEALTH MAINTENANCE LETTER (OUTPATIENT)
Age: 62
End: 2022-01-30

## 2022-07-29 NOTE — TELEPHONE ENCOUNTER
REFERRAL INFORMATION:    Referring provider:  Dr Steven Grider     Referring providers clinic:      Reason for visit/diagnosis  SCC of oral cavity- Referred by Dr. Grider PET scan done same day at [facility]    RECORDS REQUESTED FROM:       Clinic name Comments Records Status Imaging Status   Dr Steven Grider  2020 OV  Scanned in [facility]    Oral Max Surgery Consult  3/18/2020 OV with Dr Iam Noriega  Scanned in [facility]  fax. [phone] 3/18/2020 oral biopsy (Accession: [id number])     *trackin Report scanned in Epic - path req 2020 Sent for review                        2020 9:09AM called [facility] for fax. [phone], sending a fax with shipping label for slides to be mailed out- Amay   * 20 9:43 AM Pathology received from [facility] and sent to be reviewed with filled out pathology form - Florinda  
Normal for race

## 2022-09-24 ENCOUNTER — HEALTH MAINTENANCE LETTER (OUTPATIENT)
Age: 62
End: 2022-09-24

## 2023-05-08 ENCOUNTER — HEALTH MAINTENANCE LETTER (OUTPATIENT)
Age: 63
End: 2023-05-08

## (undated) DEVICE — PACK SET-UP STD 9102

## (undated) DEVICE — NDL BLUNT 18GA 1" W/O FILTER 305181

## (undated) DEVICE — SU PROLENE 5-0 PS-3 18" 8681G

## (undated) DEVICE — DRAPE POUCH INSTRUMENT 1018

## (undated) DEVICE — ESU PENCIL SMOKE EVAC W/ROCKER SWITCH 0703-047-000

## (undated) DEVICE — LABEL MEDICATION SYSTEM 3303-P

## (undated) DEVICE — SU VICRYL 3-0 RB-1 18" J713D

## (undated) DEVICE — CLIP HORIZON MED BLUE 002200

## (undated) DEVICE — SU SILK 2-0 TIE 12X30" A305H

## (undated) DEVICE — LINEN TOWEL PACK X30 5481

## (undated) DEVICE — RETR ELASTIC STAYS LONE STAR BLUNT DUAL LEAD 3550-1G

## (undated) DEVICE — VESSEL LOOPS YELLOW MAXI 31145694

## (undated) DEVICE — DRAPE SHEET MED 44X70" 9355

## (undated) DEVICE — LINEN TOWEL PACK X5 5464

## (undated) DEVICE — BNDG ESMARK 4" STERILE LF 820-3412

## (undated) DEVICE — NDL COUNTER 20CT 31142493

## (undated) DEVICE — DRSG TEGADERM 2 3/8X2 3/4" 1624W

## (undated) DEVICE — PREP SKIN SCRUB TRAY 4461A

## (undated) DEVICE — SU SILK 0 TIE 6X30" A306H

## (undated) DEVICE — SUCTION TIP YANKAUER W/O VENT K86

## (undated) DEVICE — SU SILK 4-0 TIE 12X30" A303H

## (undated) DEVICE — SOL NACL 0.9% IRRIG 1000ML BOTTLE 2F7124

## (undated) DEVICE — WIPE INSTRUMENT MEROCEL 400200

## (undated) DEVICE — EYE INSTRUMENT WIPE MEROCEL W/ADHESIVE 223625

## (undated) DEVICE — DRAIN JACKSON PRATT 10MM FLAT 4/4 PERF SU130-1311

## (undated) DEVICE — SU ETHILON 3-0 PS-1 18" 1663H

## (undated) DEVICE — STRAP UNIVERSAL POSITIONING 2-PIECE 4X47X76" 91-287

## (undated) DEVICE — CLIP HORIZON SM RED WIDE SLOT 001201

## (undated) DEVICE — DRAPE WARMER 66X44" ORS-300

## (undated) DEVICE — COVER CAMERA VIDEO LASER 9X96" 04-CC227

## (undated) DEVICE — SPONGE RAY-TEC 4X8" 7318

## (undated) DEVICE — BUR STRK SIDE-CUTTING 0.9X4.2X50.8MM MED 6FLUTE 5300-010-704

## (undated) DEVICE — SOL NACL 0.9% IRRIG 1000ML BOTTLE 07138-09

## (undated) DEVICE — DRAPE STOCKINETTE IMPERVIOUS 10" 21048

## (undated) DEVICE — LINEN TOWEL PACK X6 WHITE 5487

## (undated) DEVICE — CABLE DOPPLER FLOW EXTENSION  DP-CAB01

## (undated) DEVICE — SYR 01ML 27GA 0.5" NDL TBC 309623

## (undated) DEVICE — ESU CORD BIPOLAR AND IRR TUBING AESCULAP US355

## (undated) DEVICE — BNDG ELASTIC 4"X5YDS STERILE 6611-4S

## (undated) DEVICE — LINEN GOWN LG 5406

## (undated) DEVICE — DRSG STERI STRIP 1/2X4" R1547

## (undated) DEVICE — GEL ULTRASOUND AQUASONIC 20GM 01-01

## (undated) DEVICE — TONGUE DEPRESSOR STERILE 6023

## (undated) DEVICE — SYR 05ML LL W/O NDL

## (undated) DEVICE — SUCTION MANIFOLD DORNOCH ULTRA CART UL-CL500

## (undated) DEVICE — PREP POVIDONE IODINE SCRUB 7.5% 4OZ APL82212

## (undated) DEVICE — SU SILK 3-0 TIE 12X30" A304H

## (undated) DEVICE — DRSG BIATAIN ALGINATE 4X4" 3710

## (undated) DEVICE — CAST PLASTER SPLINT 4X15" 7394

## (undated) DEVICE — SU VICRYL 3-0 X-1 27" UND J458H

## (undated) DEVICE — CLIP GEM MICRO GEM2431

## (undated) DEVICE — DRSG XEROFORM 5X9" CUR253590W

## (undated) DEVICE — SU SILK 2-0 SH CR 5X18" C0125

## (undated) DEVICE — DRSG TEGADERM 4X4 3/4" 1626W

## (undated) DEVICE — EYE SPONGE SPEAR WECK CEL 0008685

## (undated) DEVICE — SU VICRYL 3-0 SH 8X18" UND J864D

## (undated) DEVICE — PACK NEURO MINOR UMMC SNE32MNMU4

## (undated) DEVICE — BLADE DERMATOME ZIMMER  00-8800-000-10

## (undated) DEVICE — Device

## (undated) DEVICE — ESU GROUND PAD ADULT REM W/15' CORD E7507DB

## (undated) DEVICE — RETR ELASTIC STAYS LONE STAR BLUNT SGL PK 3350-1G

## (undated) DEVICE — DRSG TEGADERM 8X12" 1629

## (undated) DEVICE — LIGHT HANDLE X1 31140133

## (undated) DEVICE — CUP AND LID 2PK 2OZ STERILE  SSK9006A

## (undated) DEVICE — TUBE NASOGASTRIC FEEDING ENTRIFLEX ENFIT 12FR 43 8884721252E

## (undated) DEVICE — STIMULATOR NERVE NEURO-PULSE SURGICAL LOCATOR 30968-220

## (undated) DEVICE — SPONGE SURGIFOAM 100 1974

## (undated) DEVICE — DRAIN JACKSON PRATT RESERVOIR 100ML SU130-1305

## (undated) DEVICE — SU ETHILON 4-0 PC-3 18" 1864G

## (undated) DEVICE — SU CHROMIC 4-0 J-1 18" 724G

## (undated) DEVICE — SUCTION SLEEVE NEPTUNE 2 165MM 0703-005-165

## (undated) DEVICE — PITCHER STERILE 1000ML  SSK9004A

## (undated) DEVICE — ESU CORD BIPOLAR GREEN 10-4000

## (undated) DEVICE — CAST PADDING 4" STERILE 9044S

## (undated) DEVICE — SUCTION SLEEVE NEPTUNE 2 125MM 0703-005-125

## (undated) DEVICE — DRSG TELFA 3X8" 1238

## (undated) DEVICE — NDL COUNTER 40CT  31142311

## (undated) DEVICE — SPONGE KITTNER 30-101

## (undated) DEVICE — ESU ELEC BLADE 2.75" COATED/INSULATED E1455

## (undated) DEVICE — BLADE KNIFE SURG 15 371115

## (undated) DEVICE — DRSG KERLIX 4 1/2"X4YDS ROLL 6715

## (undated) DEVICE — SU MONOSOF 9-0 MV135-4 N2546

## (undated) DEVICE — CANISTER WOUND VAC W/GEL 1000ML M8275093/5

## (undated) DEVICE — NDL ANGIOCATH 20GA 1.25" 4056

## (undated) DEVICE — SOL WATER IRRIG 1000ML BOTTLE 2F7114

## (undated) DEVICE — SYR 10ML LL W/O NDL 302995

## (undated) DEVICE — TUBE NASOGASTRIC FEEDING ENTRIFLEX ENFIT 10FR 43 8884721088E

## (undated) DEVICE — ESU PENCIL W/COATED BLADE E2450H

## (undated) DEVICE — SURGICEL HEMOSTAT 4X8" 1952

## (undated) DEVICE — DRAPE MICROSCOPE LEICA 54X150" AR8033650

## (undated) DEVICE — SU ETHILON 9-0 BV130-4 5" 2813G

## (undated) DEVICE — PREP POVIDONE IODINE SOLUTION 10% 4OZ

## (undated) DEVICE — VESSEL LOOPS RED MINI 31145710

## (undated) DEVICE — SYR 03ML LL W/O NDL 309657

## (undated) DEVICE — SPONGE LAP 18X18" X8435

## (undated) DEVICE — SYR EAR BULB 3OZ 0035830

## (undated) DEVICE — DRAIN PENROSE 1X18" LATEX FREE GR207

## (undated) DEVICE — CATH TRAY FOLEY SURESTEP 16FR W/TMP PRB STLK LATEX A319416AM

## (undated) DEVICE — NDL ANGIOCATH 22GA 1" 4050

## (undated) DEVICE — GLOVE PROTEXIS MICRO 6.0  2D73PM60

## (undated) DEVICE — TOURNIQUET CUFF 18" REPRO RED 60-7070-103

## (undated) RX ORDER — CALCIUM CHLORIDE 100 MG/ML
INJECTION INTRAVENOUS; INTRAVENTRICULAR
Status: DISPENSED
Start: 2020-04-23

## (undated) RX ORDER — BUPIVACAINE HYDROCHLORIDE AND EPINEPHRINE 2.5; 5 MG/ML; UG/ML
INJECTION, SOLUTION EPIDURAL; INFILTRATION; INTRACAUDAL; PERINEURAL
Status: DISPENSED
Start: 2020-04-23

## (undated) RX ORDER — ACETAMINOPHEN 500 MG
TABLET ORAL
Status: DISPENSED
Start: 2020-04-23

## (undated) RX ORDER — FENTANYL CITRATE 50 UG/ML
INJECTION, SOLUTION INTRAMUSCULAR; INTRAVENOUS
Status: DISPENSED
Start: 2020-04-23

## (undated) RX ORDER — EPINEPHRINE NASAL SOLUTION 1 MG/ML
SOLUTION NASAL
Status: DISPENSED
Start: 2020-04-23

## (undated) RX ORDER — DEXAMETHASONE SODIUM PHOSPHATE 4 MG/ML
INJECTION, SOLUTION INTRA-ARTICULAR; INTRALESIONAL; INTRAMUSCULAR; INTRAVENOUS; SOFT TISSUE
Status: DISPENSED
Start: 2020-04-23

## (undated) RX ORDER — PROPOFOL 10 MG/ML
INJECTION, EMULSION INTRAVENOUS
Status: DISPENSED
Start: 2020-04-23

## (undated) RX ORDER — OXYMETAZOLINE HYDROCHLORIDE 0.05 G/100ML
SPRAY NASAL
Status: DISPENSED
Start: 2020-04-23

## (undated) RX ORDER — GLYCOPYRROLATE 0.2 MG/ML
INJECTION, SOLUTION INTRAMUSCULAR; INTRAVENOUS
Status: DISPENSED
Start: 2020-04-23

## (undated) RX ORDER — EPHEDRINE SULFATE 50 MG/ML
INJECTION, SOLUTION INTRAMUSCULAR; INTRAVENOUS; SUBCUTANEOUS
Status: DISPENSED
Start: 2020-04-23

## (undated) RX ORDER — GABAPENTIN 300 MG/1
CAPSULE ORAL
Status: DISPENSED
Start: 2020-04-23

## (undated) RX ORDER — AMPICILLIN AND SULBACTAM 2; 1 G/1; G/1
INJECTION, POWDER, FOR SOLUTION INTRAMUSCULAR; INTRAVENOUS
Status: DISPENSED
Start: 2020-04-23

## (undated) RX ORDER — LIDOCAINE HYDROCHLORIDE 20 MG/ML
INJECTION, SOLUTION EPIDURAL; INFILTRATION; INTRACAUDAL; PERINEURAL
Status: DISPENSED
Start: 2020-04-23

## (undated) RX ORDER — MINERAL OIL
OIL (ML) MISCELLANEOUS
Status: DISPENSED
Start: 2020-04-23

## (undated) RX ORDER — HYDROMORPHONE HYDROCHLORIDE 1 MG/ML
INJECTION, SOLUTION INTRAMUSCULAR; INTRAVENOUS; SUBCUTANEOUS
Status: DISPENSED
Start: 2020-04-23

## (undated) RX ORDER — LIDOCAINE HYDROCHLORIDE AND EPINEPHRINE 10; 10 MG/ML; UG/ML
INJECTION, SOLUTION INFILTRATION; PERINEURAL
Status: DISPENSED
Start: 2020-04-23

## (undated) RX ORDER — PAPAVERINE HYDROCHLORIDE 30 MG/ML
INJECTION INTRAMUSCULAR; INTRAVENOUS
Status: DISPENSED
Start: 2020-04-23

## (undated) RX ORDER — FENTANYL CITRATE 50 UG/ML
INJECTION, SOLUTION INTRAMUSCULAR; INTRAVENOUS
Status: DISPENSED
Start: 2020-04-24